# Patient Record
Sex: MALE | Race: BLACK OR AFRICAN AMERICAN | NOT HISPANIC OR LATINO | Employment: FULL TIME | URBAN - METROPOLITAN AREA
[De-identification: names, ages, dates, MRNs, and addresses within clinical notes are randomized per-mention and may not be internally consistent; named-entity substitution may affect disease eponyms.]

---

## 2024-01-06 ENCOUNTER — APPOINTMENT (EMERGENCY)
Dept: CT IMAGING | Facility: HOSPITAL | Age: 54
End: 2024-01-06
Payer: COMMERCIAL

## 2024-01-06 ENCOUNTER — HOSPITAL ENCOUNTER (EMERGENCY)
Facility: HOSPITAL | Age: 54
Discharge: LEFT BEFORE DISCHARGE | End: 2024-01-06
Attending: EMERGENCY MEDICINE | Admitting: EMERGENCY MEDICINE
Payer: COMMERCIAL

## 2024-01-06 VITALS
HEART RATE: 104 BPM | SYSTOLIC BLOOD PRESSURE: 167 MMHG | OXYGEN SATURATION: 100 % | DIASTOLIC BLOOD PRESSURE: 115 MMHG | TEMPERATURE: 97.5 F | RESPIRATION RATE: 16 BRPM

## 2024-01-06 DIAGNOSIS — K63.89 COLONIC MASS: ICD-10-CM

## 2024-01-06 DIAGNOSIS — K56.609 COLONIC OBSTRUCTION (HCC): Primary | ICD-10-CM

## 2024-01-06 DIAGNOSIS — K59.00 CONSTIPATION: ICD-10-CM

## 2024-01-06 LAB
ALBUMIN SERPL BCP-MCNC: 3.9 G/DL (ref 3.5–5)
ALP SERPL-CCNC: 60 U/L (ref 34–104)
ALT SERPL W P-5'-P-CCNC: 17 U/L (ref 7–52)
ANION GAP SERPL CALCULATED.3IONS-SCNC: 10 MMOL/L
AST SERPL W P-5'-P-CCNC: 16 U/L (ref 13–39)
BASOPHILS # BLD AUTO: 0.06 THOUSANDS/ÂΜL (ref 0–0.1)
BASOPHILS NFR BLD AUTO: 1 % (ref 0–1)
BILIRUB SERPL-MCNC: 0.6 MG/DL (ref 0.2–1)
BUN SERPL-MCNC: 14 MG/DL (ref 5–25)
CALCIUM SERPL-MCNC: 9.2 MG/DL (ref 8.4–10.2)
CHLORIDE SERPL-SCNC: 100 MMOL/L (ref 96–108)
CO2 SERPL-SCNC: 23 MMOL/L (ref 21–32)
CREAT SERPL-MCNC: 0.92 MG/DL (ref 0.6–1.3)
EOSINOPHIL # BLD AUTO: 0.1 THOUSAND/ÂΜL (ref 0–0.61)
EOSINOPHIL NFR BLD AUTO: 1 % (ref 0–6)
ERYTHROCYTE [DISTWIDTH] IN BLOOD BY AUTOMATED COUNT: 14.6 % (ref 11.6–15.1)
GFR SERPL CREATININE-BSD FRML MDRD: 95 ML/MIN/1.73SQ M
GLUCOSE SERPL-MCNC: 110 MG/DL (ref 65–140)
HCT VFR BLD AUTO: 44.4 % (ref 36.5–49.3)
HGB BLD-MCNC: 15.3 G/DL (ref 12–17)
IMM GRANULOCYTES # BLD AUTO: 0.01 THOUSAND/UL (ref 0–0.2)
IMM GRANULOCYTES NFR BLD AUTO: 0 % (ref 0–2)
LIPASE SERPL-CCNC: 10 U/L (ref 11–82)
LYMPHOCYTES # BLD AUTO: 1.72 THOUSANDS/ÂΜL (ref 0.6–4.47)
LYMPHOCYTES NFR BLD AUTO: 22 % (ref 14–44)
MCH RBC QN AUTO: 28.8 PG (ref 26.8–34.3)
MCHC RBC AUTO-ENTMCNC: 34.5 G/DL (ref 31.4–37.4)
MCV RBC AUTO: 84 FL (ref 82–98)
MONOCYTES # BLD AUTO: 1.04 THOUSAND/ÂΜL (ref 0.17–1.22)
MONOCYTES NFR BLD AUTO: 14 % (ref 4–12)
NEUTROPHILS # BLD AUTO: 4.75 THOUSANDS/ÂΜL (ref 1.85–7.62)
NEUTS SEG NFR BLD AUTO: 62 % (ref 43–75)
NRBC BLD AUTO-RTO: 0 /100 WBCS
PLATELET # BLD AUTO: 371 THOUSANDS/UL (ref 149–390)
PMV BLD AUTO: 9.5 FL (ref 8.9–12.7)
POTASSIUM SERPL-SCNC: 3.6 MMOL/L (ref 3.5–5.3)
PROCALCITONIN SERPL-MCNC: 0.08 NG/ML
PROT SERPL-MCNC: 7.4 G/DL (ref 6.4–8.4)
RBC # BLD AUTO: 5.31 MILLION/UL (ref 3.88–5.62)
SODIUM SERPL-SCNC: 133 MMOL/L (ref 135–147)
WBC # BLD AUTO: 7.68 THOUSAND/UL (ref 4.31–10.16)

## 2024-01-06 PROCEDURE — 96361 HYDRATE IV INFUSION ADD-ON: CPT

## 2024-01-06 PROCEDURE — 84145 PROCALCITONIN (PCT): CPT | Performed by: PHYSICIAN ASSISTANT

## 2024-01-06 PROCEDURE — 83690 ASSAY OF LIPASE: CPT | Performed by: PHYSICIAN ASSISTANT

## 2024-01-06 PROCEDURE — 99284 EMERGENCY DEPT VISIT MOD MDM: CPT

## 2024-01-06 PROCEDURE — 99285 EMERGENCY DEPT VISIT HI MDM: CPT | Performed by: EMERGENCY MEDICINE

## 2024-01-06 PROCEDURE — G1004 CDSM NDSC: HCPCS

## 2024-01-06 PROCEDURE — 96360 HYDRATION IV INFUSION INIT: CPT

## 2024-01-06 PROCEDURE — 74177 CT ABD & PELVIS W/CONTRAST: CPT

## 2024-01-06 PROCEDURE — 36415 COLL VENOUS BLD VENIPUNCTURE: CPT | Performed by: PHYSICIAN ASSISTANT

## 2024-01-06 PROCEDURE — 85025 COMPLETE CBC W/AUTO DIFF WBC: CPT | Performed by: PHYSICIAN ASSISTANT

## 2024-01-06 PROCEDURE — 80053 COMPREHEN METABOLIC PANEL: CPT | Performed by: PHYSICIAN ASSISTANT

## 2024-01-06 PROCEDURE — 84443 ASSAY THYROID STIM HORMONE: CPT | Performed by: INTERNAL MEDICINE

## 2024-01-06 RX ADMIN — SODIUM CHLORIDE 1000 ML: 0.9 INJECTION, SOLUTION INTRAVENOUS at 12:07

## 2024-01-06 RX ADMIN — IOHEXOL 100 ML: 350 INJECTION, SOLUTION INTRAVENOUS at 12:49

## 2024-01-06 NOTE — ED PROVIDER NOTES
"History  Chief Complaint   Patient presents with    Abdominal Pain     PT \"I feel like I can't poop. I feel like I have not really had anything come out this year. And it is getting to the point where I dont want to eat. I tried that stuff but I dont think it worked. It like charan comes and goes. And I thought I felt good but then I started working, you know bending over and stuff.\" PT denies CP and SOB     This is a 53-year-old male with no significant past medical history presenting to the emergency department today for constipation.  He notes the last time he had a significant bowel movement was on December 25, 2023.  He had 1 small \"nugget\" that came out without blood approximately 4 days ago but he has not had a significant bowel movement since then.  He does not have overflow diarrhea.  He notes generalized abdominal pain associated with abdominal distention.  He has no nausea or vomiting.  He has no fevers or chills.  He has no chest pain or shortness of breath.  He has decreased appetite and has recently unintentionally lost weight.  No prior abdominal surgical history.  The patient denies other complaints at this time.      History provided by:  Patient   used: No    Abdominal Pain  Pain location:  Generalized  Pain radiates to:  Does not radiate  Pain severity:  Moderate  Onset quality:  Gradual  Duration:  2 weeks  Timing:  Constant  Progression:  Worsening  Chronicity:  New  Relieved by:  Nothing  Worsened by:  Nothing  Ineffective treatments:  None tried  Associated symptoms: anorexia and constipation    Associated symptoms: no belching, no chest pain, no chills, no cough, no diarrhea, no dysuria, no fatigue, no fever, no flatus, no hematuria, no melena, no nausea, no shortness of breath, no sore throat and no vomiting        None       No past medical history on file.    Past Surgical History:   Procedure Laterality Date    KNEE SURGERY Right        No family history on file.  I " have reviewed and agree with the history as documented.    E-Cigarette/Vaping    E-Cigarette Use Never User      E-Cigarette/Vaping Substances     Social History     Tobacco Use    Smoking status: Some Days     Current packs/day: 1.00     Types: Cigarettes   Vaping Use    Vaping status: Never Used   Substance Use Topics    Alcohol use: Yes     Comment: daily    Drug use: Yes     Types: Cocaine     Comment: last used: 12/25/23       Review of Systems   Constitutional:  Negative for appetite change, chills, diaphoresis, fatigue and fever.   HENT:  Negative for sore throat.    Eyes:  Negative for visual disturbance.   Respiratory:  Negative for cough, chest tightness, shortness of breath and wheezing.    Cardiovascular:  Negative for chest pain, palpitations and leg swelling.   Gastrointestinal:  Positive for abdominal distention, abdominal pain, anorexia and constipation. Negative for diarrhea, flatus, melena, nausea and vomiting.   Genitourinary:  Negative for dysuria and hematuria.   Musculoskeletal:  Negative for neck pain and neck stiffness.   Skin:  Negative for rash and wound.   Neurological:  Negative for dizziness, seizures, syncope, weakness, light-headedness, numbness and headaches.   Psychiatric/Behavioral:  Negative for confusion.    All other systems reviewed and are negative.      Physical Exam  Physical Exam  Vitals and nursing note reviewed.   Constitutional:       General: He is not in acute distress.     Appearance: Normal appearance. He is normal weight. He is not ill-appearing, toxic-appearing or diaphoretic.   HENT:      Head: Normocephalic and atraumatic.      Nose: Nose normal. No congestion or rhinorrhea.      Mouth/Throat:      Mouth: Mucous membranes are moist.      Pharynx: No oropharyngeal exudate or posterior oropharyngeal erythema.   Eyes:      General: No scleral icterus.        Right eye: No discharge.         Left eye: No discharge.      Conjunctiva/sclera: Conjunctivae normal.    Cardiovascular:      Rate and Rhythm: Normal rate and regular rhythm.      Pulses: Normal pulses.      Heart sounds: Normal heart sounds. No murmur heard.     No friction rub. No gallop.   Pulmonary:      Effort: Pulmonary effort is normal. No respiratory distress.      Breath sounds: Normal breath sounds. No stridor. No wheezing, rhonchi or rales.   Chest:      Chest wall: No tenderness.   Abdominal:      General: Abdomen is flat. There is distension.      Palpations: Abdomen is soft.      Tenderness: There is abdominal tenderness. There is no right CVA tenderness, left CVA tenderness, guarding or rebound.      Comments: The patient has generalized abdominal tenderness to palpation with one area of the abdomen not being more tender than the other; the patient is nonperitoneal; the patient's abdomen is firm and slightly distended   Musculoskeletal:         General: Normal range of motion.      Cervical back: Normal range of motion. No rigidity.      Right lower leg: No edema.      Left lower leg: No edema.   Skin:     General: Skin is warm and dry.      Capillary Refill: Capillary refill takes less than 2 seconds.      Coloration: Skin is not jaundiced or pale.   Neurological:      General: No focal deficit present.      Mental Status: He is alert and oriented to person, place, and time. Mental status is at baseline.   Psychiatric:         Mood and Affect: Mood normal.         Behavior: Behavior normal.         Vital Signs  ED Triage Vitals   Temperature Pulse Respirations Blood Pressure SpO2   01/06/24 1137 01/06/24 1134 01/06/24 1134 01/06/24 1134 01/06/24 1134   97.5 °F (36.4 °C) (!) 110 18 (!) 157/116 100 %      Temp Source Heart Rate Source Patient Position - Orthostatic VS BP Location FiO2 (%)   01/06/24 1137 01/06/24 1134 01/06/24 1134 01/06/24 1134 --   Oral Monitor Sitting Left arm       Pain Score       01/06/24 1200       No Pain           Vitals:    01/06/24 1200 01/06/24 1315 01/06/24 1345 01/06/24  1430   BP: (!) 180/117 (!) 164/114 (!) 175/124 (!) 167/115   Pulse: 92 88 91 104   Patient Position - Orthostatic VS: Sitting Sitting Sitting Sitting         Visual Acuity      ED Medications  Medications   sodium chloride 0.9 % bolus 1,000 mL (0 mL Intravenous Stopped 1/6/24 1345)   iohexol (OMNIPAQUE) 350 MG/ML injection (SINGLE-DOSE) 100 mL (100 mL Intravenous Given 1/6/24 1249)       Diagnostic Studies  Results Reviewed       Procedure Component Value Units Date/Time    Procalcitonin [[id number]]  (Normal) Collected: 01/06/24 1206    Lab Status: Final result Specimen: Blood from Arm, Right Updated: 01/06/24 1242     Procalcitonin 0.08 ng/ml     Comprehensive metabolic panel [[id number]]  (Abnormal) Collected: 01/06/24 1206    Lab Status: Final result Specimen: Blood from Arm, Right Updated: 01/06/24 1231     Sodium 133 mmol/L      Potassium 3.6 mmol/L      Chloride 100 mmol/L      CO2 23 mmol/L      ANION GAP 10 mmol/L      BUN 14 mg/dL      Creatinine 0.92 mg/dL      Glucose 110 mg/dL      Calcium 9.2 mg/dL      AST 16 U/L      ALT 17 U/L      Alkaline Phosphatase 60 U/L      Total Protein 7.4 g/dL      Albumin 3.9 g/dL      Total Bilirubin 0.60 mg/dL      eGFR 95 ml/min/1.73sq m     Narrative:      National Kid[employer] Foundation guidelines for Chronic Kidney Disease (CKD):     Stage 1 with normal or high GFR (GFR > 90 mL/min/1.73 square meters)    Stage 2 Mild CKD (GFR = 60-89 mL/min/1.73 square meters)    Stage 3A Moderate CKD (GFR = 45-59 mL/min/1.73 square meters)    Stage 3B Moderate CKD (GFR = 30-44 mL/min/1.73 square meters)    Stage 4 Severe CKD (GFR = 15-29 mL/min/1.73 square meters)    Stage 5 End Stage CKD (GFR <15 mL/min/1.73 square meters)  Note: GFR calculation is accurate only with a steady state creatinine    Lipase [[id number]]  (Abnormal) Collected: 01/06/24 1206    Lab Status: Final result Specimen: Blood from Arm, Right Updated: 01/06/24 1231     Lipase 10 u/L     CBC and differential  [[id number]]  (Abnormal) Collected: 01/06/24 1206    Lab Status: Final result Specimen: Blood from Arm, Right Updated: 01/06/24 1216     WBC 7.68 Thousand/uL      RBC 5.31 Million/uL      Hemoglobin 15.3 g/dL      Hematocrit 44.4 %      MCV 84 fL      MCH 28.8 pg      MCHC 34.5 g/dL      RDW 14.6 %      MPV 9.5 fL      Platelets 371 Thousands/uL      nRBC 0 /100 WBCs      Neutrophils Relative 62 %      Immat GRANS % 0 %      Lymphocytes Relative 22 %      Monocytes Relative 14 %      Eosinophils Relative 1 %      Basophils Relative 1 %      Neutrophils Absolute 4.75 Thousands/µL      Immature Grans Absolute 0.01 Thousand/uL      Lymphocytes Absolute 1.72 Thousands/µL      Monocytes Absolute 1.04 Thousand/µL      Eosinophils Absolute 0.10 Thousand/µL      Basophils Absolute 0.06 Thousands/µL                    CT abdomen pelvis with contrast   Final Result by Lincoln Chaney MD (01/06 1346)      Partial colonic obstruction secondary to a 7 cm long apple core lesion of the mid sigmoid colon suspicious for colonic malignancy.      The study was marked in EPIC for immediate notification.            Workstation performed: [id number]                    Procedures  Procedures         ED Course  ED Course as of 01/06/24 1705   Sat Jan 06, 2024   1401 TT Mitchell Gill PA-C, general surgery AP on call.                                             Medical Decision Making  This is a 53-year-old male presenting to the emergency department today for constipation associated with anorexia, unintentional weight loss, and constipation.  Symptoms ongoing for approximately 2 weeks.  Has not had a significant bowel movement in 2 weeks.  Vital signs show tachycardia.  On physical examination, the patient's abdomen is generally tender and firm to palpation but nonperitoneal.  The patient has no leukocytosis and CMP is reassuring.  His procalcitonin was 0.08.  The patient was given intravenous fluids while here in the emergency  "department.  CT abdomen and pelvis shows partial colonic obstruction likely secondary to a sigmoid colon malignancy.  Case was discussed both with Mitchell Gill PA-C and Dr. Cheek with general surgery; they recommend liquid diet in addition to consult with gastroenterology.  I spoke with Dr. Gooden with gastroenterology who recommends admission for colonoscopy.  I discussed with the patient but the patient notes he has \"things to do\" at home.  I discussed with the patient that I am concerned that he has colon cancer and that it would benefit him from staying in the hospital especially given the severity of the constipation he has been experiencing.  He notes he will return to the emergency department tomorrow.  Negative consequences of leaving the emergency department AGAINST MEDICAL ADVICE were discussed with the patient.  He is able to tell negative consequences back to me and still wishes not to be admitted to the hospital.  The patient then signed out of the emergency department AGAINST MEDICAL ADVICE.  I gave the patient referrals to general surgery, gastroenterology, and colorectal surgery.  If he does return tomorrow, I recommend a liquid diet in the meantime per general surgery's recommendation.  Patient is aware that he can return to the emergency department at any time for reevaluation and admission.  Strict return precautions were given.  Recommend PCP follow-up as soon as possible. The patient and/or patient's proxy verify their understanding and agree to the plan at this time.  All questions answered to the patient and/or their proxy's satisfaction.  All labs reviewed and utilized in the medical decision making process (if labs were ordered).  Portions of the record may have been created with voice recognition software.  Occasional wrong word or \"sound a like\" substitutions may have occurred due to the inherent limitations of voice recognition software.  Read the chart carefully and recognize, using " context, where substitutions have occurred.    I reviewed prior notes.    Problems Addressed:  Colonic mass: undiagnosed new problem with uncertain prognosis  Colonic obstruction (HCC): undiagnosed new problem with uncertain prognosis  Constipation: undiagnosed new problem with uncertain prognosis    Amount and/or Complexity of Data Reviewed  External Data Reviewed: notes.  Labs: ordered. Decision-making details documented in ED Course.  Radiology: ordered. Decision-making details documented in ED Course.  Discussion of management or test interpretation with external provider(s): Mitchell Gill PA-C - General Surgery  Dr. López - General Surgery  Dr. Gooden - Gastroenterology    Risk  Prescription drug management.             Disposition  Final diagnoses:   Colonic obstruction (HCC)   Colonic mass   Constipation     Time reflects when diagnosis was documented in both MDM as applicable and the Disposition within this note       Time User Action Codes Description Comment    1/6/2024  2:28 PM Stone Mckinney [K56.609] Colonic obstruction (HCC)     1/6/2024  2:28 PM Stone Mckinney [K63.89] Colonic mass     1/6/2024  2:29 PM Stone Mckinney [K59.00] Constipation           ED Disposition       ED Disposition   AMA    Condition   --    Date/Time   Sat Jan 6, 2024  2:28 PM    Comment   Date: 1/6/2024  Patient: Davis Goss  Admitted: 1/6/2024 11:36 AM  Attending Provider: Jackie Schuler MD    Davis Goss or his authorized caregiver has made the decision for the patient to leave the emergency department against the advice  of the emergency department staff (Stone Mckinney PA-C and Dr. Jackie Schuler). He or his authorized caregiver has been informed and understands the inherent risks, including death, total colonic obstruction, metastatic cancer, urinary retention , bladder rupture, intestinal rupture, peritonitis, severe abdominal pain, constipation, obstipation, sepsis,  peritonitis, other morbidities, or even death.  He or his authorized caregiver has decided to accept the responsibility for this decision. LOUISE Goss and all necessary parties have been advised that he may return for further evaluation or treatment. His condition at time of discharge was stable.  Davis Goss had current vital signs as follows:  BP (!) 175/124 (BP Location: Left ar m)   Pulse 91   Temp 97.5 °F (36.4 °C) (Oral)   Resp 16                Follow-up Information       Follow up With Specialties Details Why Contact Info Additional Information    [facility] Emergency Department Emergency Medicine Go to  If symptoms worsen [address] [address] [phone] [facility] Emergency Department, [address] [address]    [facility] Schedule an appointment as soon as possible for a visit   [address] [facility] [address] [phone] [facility], [address] [place] [address], [address] [phone]    Robles Cheek MD General Surgery Call   [address] [facility]  [place] [address]  [phone]       Tawanda Gooden MD Gastroenterology Call   [address] [place] [address] [address] [place] [address] [phone]       Reyes Lyle MD Colon and Rectal Surgery Call   [address] [facility] [address] [phone]               There are no discharge medications for this patient.          PDMP Review       None            ED Provider  Electronically Signed by             Stone Mckinney PA-C  01/06/24 0080

## 2024-01-06 NOTE — DISCHARGE INSTRUCTIONS
Please return to the emergency department for worsening symptoms including chest pain, shortness of breath, dizziness, lightheadedness, fever greater than 103, severe pain, inability to walk, fainting episodes, etc..  Please follow-up with your family practice provider as soon as possible.  You are leaving the emergency department AGAINST MEDICAL ADVICE.  By leaving, you except risks of potential negative consequences that could occur from not being admitted to the hospital.  Please return to the emergency department at any time for admission for further evaluation of your partial colonic obstruction with likely colon cancer.  Please only have a liquid diet at home until you are evaluated by a medical professional.  I have given you referrals to oncology, general surgery, and colorectal surgery in addition to gastroenterology.  You likely have colon cancer.  Please return to the emergency department as soon as possible for admission to ascertain next steps for this diagnosis.

## 2024-01-07 ENCOUNTER — HOSPITAL ENCOUNTER (INPATIENT)
Facility: HOSPITAL | Age: 54
LOS: 13 days | Discharge: HOME HEALTH | DRG: 329 | End: 2024-01-20
Attending: EMERGENCY MEDICINE | Admitting: INTERNAL MEDICINE
Payer: COMMERCIAL

## 2024-01-07 DIAGNOSIS — K51.00 PANCOLITIS (HCC): ICD-10-CM

## 2024-01-07 DIAGNOSIS — R31.9 HEMATURIA: ICD-10-CM

## 2024-01-07 DIAGNOSIS — K63.89 COLONIC MASS: ICD-10-CM

## 2024-01-07 DIAGNOSIS — Z90.49 S/P PARTIAL COLECTOMY: ICD-10-CM

## 2024-01-07 DIAGNOSIS — K59.00 CONSTIPATION: ICD-10-CM

## 2024-01-07 DIAGNOSIS — E87.6 HYPOKALEMIA: ICD-10-CM

## 2024-01-07 DIAGNOSIS — R03.0 ELEVATED BLOOD PRESSURE READING: ICD-10-CM

## 2024-01-07 DIAGNOSIS — R94.31 ABNORMAL EKG: ICD-10-CM

## 2024-01-07 DIAGNOSIS — Z93.3 STATUS POST COLOSTOMY (HCC): ICD-10-CM

## 2024-01-07 DIAGNOSIS — K56.609 COLONIC OBSTRUCTION (HCC): Primary | ICD-10-CM

## 2024-01-07 PROBLEM — Z72.0 TOBACCO ABUSE: Status: ACTIVE | Noted: 2024-01-07

## 2024-01-07 PROBLEM — F19.90 SUBSTANCE USE: Status: ACTIVE | Noted: 2024-01-07

## 2024-01-07 LAB
2HR DELTA HS TROPONIN: 0 NG/L
4HR DELTA HS TROPONIN: 0 NG/L
AMPHETAMINES SERPL QL SCN: NEGATIVE
BARBITURATES UR QL: NEGATIVE
BENZODIAZ UR QL: NEGATIVE
CARDIAC TROPONIN I PNL SERPL HS: 7 NG/L
COCAINE UR QL: POSITIVE
METHADONE UR QL: NEGATIVE
OPIATES UR QL SCN: POSITIVE
OXYCODONE+OXYMORPHONE UR QL SCN: POSITIVE
PCP UR QL: NEGATIVE
THC UR QL: NEGATIVE
TSH SERPL DL<=0.05 MIU/L-ACNC: 1.49 UIU/ML (ref 0.45–4.5)

## 2024-01-07 PROCEDURE — 99283 EMERGENCY DEPT VISIT LOW MDM: CPT

## 2024-01-07 PROCEDURE — 93005 ELECTROCARDIOGRAM TRACING: CPT

## 2024-01-07 PROCEDURE — 99285 EMERGENCY DEPT VISIT HI MDM: CPT | Performed by: PHYSICIAN ASSISTANT

## 2024-01-07 PROCEDURE — 84484 ASSAY OF TROPONIN QUANT: CPT | Performed by: INTERNAL MEDICINE

## 2024-01-07 PROCEDURE — 99223 1ST HOSP IP/OBS HIGH 75: CPT

## 2024-01-07 PROCEDURE — 80307 DRUG TEST PRSMV CHEM ANLYZR: CPT

## 2024-01-07 RX ORDER — ACETAMINOPHEN 325 MG/1
650 TABLET ORAL EVERY 6 HOURS PRN
Status: DISCONTINUED | OUTPATIENT
Start: 2024-01-07 | End: 2024-01-09

## 2024-01-07 RX ORDER — OXYCODONE HYDROCHLORIDE 5 MG/1
5 TABLET ORAL EVERY 4 HOURS PRN
Status: DISCONTINUED | OUTPATIENT
Start: 2024-01-07 | End: 2024-01-09

## 2024-01-07 RX ORDER — ONDANSETRON 2 MG/ML
4 INJECTION INTRAMUSCULAR; INTRAVENOUS EVERY 6 HOURS PRN
Status: DISCONTINUED | OUTPATIENT
Start: 2024-01-07 | End: 2024-01-20 | Stop reason: HOSPADM

## 2024-01-07 RX ORDER — ENOXAPARIN SODIUM 100 MG/ML
40 INJECTION SUBCUTANEOUS DAILY
Status: DISCONTINUED | OUTPATIENT
Start: 2024-01-07 | End: 2024-01-20 | Stop reason: HOSPADM

## 2024-01-07 RX ORDER — NICOTINE 21 MG/24HR
1 PATCH, TRANSDERMAL 24 HOURS TRANSDERMAL DAILY
Status: DISCONTINUED | OUTPATIENT
Start: 2024-01-07 | End: 2024-01-20 | Stop reason: HOSPADM

## 2024-01-07 RX ORDER — OXYCODONE HYDROCHLORIDE 5 MG/1
2.5 TABLET ORAL EVERY 4 HOURS PRN
Status: DISCONTINUED | OUTPATIENT
Start: 2024-01-07 | End: 2024-01-09

## 2024-01-07 RX ORDER — HYDROMORPHONE HCL IN WATER/PF 6 MG/30 ML
0.2 PATIENT CONTROLLED ANALGESIA SYRINGE INTRAVENOUS EVERY 2 HOUR PRN
Status: DISCONTINUED | OUTPATIENT
Start: 2024-01-07 | End: 2024-01-07

## 2024-01-07 RX ORDER — HYDROMORPHONE HCL/PF 1 MG/ML
0.5 SYRINGE (ML) INJECTION EVERY 2 HOUR PRN
Status: DISCONTINUED | OUTPATIENT
Start: 2024-01-07 | End: 2024-01-09

## 2024-01-07 RX ORDER — SODIUM CHLORIDE, SODIUM GLUCONATE, SODIUM ACETATE, POTASSIUM CHLORIDE, MAGNESIUM CHLORIDE, SODIUM PHOSPHATE, DIBASIC, AND POTASSIUM PHOSPHATE .53; .5; .37; .037; .03; .012; .00082 G/100ML; G/100ML; G/100ML; G/100ML; G/100ML; G/100ML; G/100ML
75 INJECTION, SOLUTION INTRAVENOUS CONTINUOUS
Status: DISCONTINUED | OUTPATIENT
Start: 2024-01-07 | End: 2024-01-09

## 2024-01-07 RX ADMIN — OXYCODONE HYDROCHLORIDE 5 MG: 5 TABLET ORAL at 23:11

## 2024-01-07 RX ADMIN — SODIUM CHLORIDE, SODIUM GLUCONATE, SODIUM ACETATE, POTASSIUM CHLORIDE, MAGNESIUM CHLORIDE, SODIUM PHOSPHATE, DIBASIC, AND POTASSIUM PHOSPHATE 75 ML/HR: .53; .5; .37; .037; .03; .012; .00082 INJECTION, SOLUTION INTRAVENOUS at 15:06

## 2024-01-07 RX ADMIN — OXYCODONE HYDROCHLORIDE 5 MG: 5 TABLET ORAL at 18:49

## 2024-01-07 RX ADMIN — HYDROMORPHONE HYDROCHLORIDE 0.5 MG: 1 INJECTION, SOLUTION INTRAMUSCULAR; INTRAVENOUS; SUBCUTANEOUS at 19:56

## 2024-01-07 RX ADMIN — OXYCODONE HYDROCHLORIDE 5 MG: 5 TABLET ORAL at 15:09

## 2024-01-07 RX ADMIN — HYDROMORPHONE HYDROCHLORIDE 0.5 MG: 1 INJECTION, SOLUTION INTRAMUSCULAR; INTRAVENOUS; SUBCUTANEOUS at 16:08

## 2024-01-07 RX ADMIN — ONDANSETRON 4 MG: 2 INJECTION INTRAMUSCULAR; INTRAVENOUS at 16:14

## 2024-01-07 RX ADMIN — SODIUM CHLORIDE, SODIUM GLUCONATE, SODIUM ACETATE, POTASSIUM CHLORIDE, MAGNESIUM CHLORIDE, SODIUM PHOSPHATE, DIBASIC, AND POTASSIUM PHOSPHATE 150 ML/HR: .53; .5; .37; .037; .03; .012; .00082 INJECTION, SOLUTION INTRAVENOUS at 23:18

## 2024-01-07 NOTE — PLAN OF CARE
Problem: PAIN - ADULT  Goal: Verbalizes/displays adequate comfort level or baseline comfort level  Description: Interventions:  - Encourage patient to monitor pain and request assistance  - Assess pain using appropriate pain scale  - Administer analgesics based on type and severity of pain and evaluate response  - Implement non-pharmacological measures as appropriate and evaluate response  - Consider cultural and social influences on pain and pain management  - Notify physician/advanced practitioner if interventions unsuccessful or patient reports new pain  Outcome: Progressing     Problem: DISCHARGE PLANNING  Goal: Discharge to home or other facility with appropriate resources  Description: INTERVENTIONS:  - Identify barriers to discharge w/patient and caregiver  - Arrange for needed discharge resources and transportation as appropriate  - Identify discharge learning needs (meds, wound care, etc.)  - Arrange for interpretive services to assist at discharge as needed  - Refer to Case Management Department for coordinating discharge planning if the patient needs post-hospital services based on physician/advanced practitioner order or complex needs related to functional status, cognitive ability, or social support system  Outcome: Progressing     Problem: Knowledge Deficit  Goal: Patient/family/caregiver demonstrates understanding of disease process, treatment plan, medications, and discharge instructions  Description: Complete learning assessment and assess knowledge base.  Interventions:  - Provide teaching at level of understanding  - Provide teaching via preferred learning methods  Outcome: Progressing     Problem: GASTROINTESTINAL - ADULT  Goal: Minimal or absence of nausea and/or vomiting  Description: INTERVENTIONS:  - Administer IV fluids if ordered to ensure adequate hydration  - Maintain NPO status until nausea and vomiting are resolved  - Nasogastric tube if ordered  - Administer ordered antiemetic  medications as needed  - Provide nonpharmacologic comfort measures as appropriate  - Advance diet as tolerated, if ordered  - Consider nutrition services referral to assist patient with adequate nutrition and appropriate food choices  Outcome: Progressing  Goal: Maintains or returns to baseline bowel function  Description: INTERVENTIONS:  - Assess bowel function  - Encourage oral fluids to ensure adequate hydration  - Administer IV fluids if ordered to ensure adequate hydration  - Administer ordered medications as needed  - Encourage mobilization and activity  - Consider nutritional services referral to assist patient with adequate nutrition and appropriate food choices  Outcome: Progressing  Goal: Maintains adequate nutritional intake  Description: INTERVENTIONS:  - Monitor percentage of each meal consumed  - Identify factors contributing to decreased intake, treat as appropriate  - Assist with meals as needed  - Monitor I&O, weight, and lab values if indicated  - Obtain nutrition services referral as needed  Outcome: Progressing

## 2024-01-07 NOTE — ASSESSMENT & PLAN NOTE
Pt stated he drinks liquor and beer multiple times a week. Unable to specify quantity.  Pt also noted occasional marijuana and coke use   Will order UDS  Initiate CIWA protocol  Continue supportive care

## 2024-01-07 NOTE — ED PROVIDER NOTES
"History  Chief Complaint   Patient presents with    Medical Problem - Re-Evaluation     \"They wanted me to stay, but I had things to do for work, they know why I'm here\" patient refuses to elaborate on diagnosis or symptoms     This is a 53-year-old male with recently diagnosed partial colonic bowel obstruction with sigmoid mass presenting to the emergency department today for reevaluation.  The patient presented to the emergency department yesterday for constipation.  I saw him on this visit.  He was diagnosed with a partial colonic bowel obstruction with sigmoid mass that is likely cancer.  I spoke with gastroenterology who at that time recommended admission for colonoscopy.  He signed out of the emergency department AGAINST MEDICAL ADVICE and told me he would come back today.  He presents to the emergency department today for admission.  Notes he still has not had a bowel movement.  The patient denies other complaints at this time.      History provided by:  Patient   used: No    Medical Problem - Re-Evaluation  Severity:  Moderate  Onset quality:  Gradual  Duration:  2 weeks  Associated symptoms: abdominal pain    Associated symptoms: no chest pain, no congestion, no cough, no diarrhea, no ear pain, no fatigue, no fever, no headaches, no loss of consciousness, no myalgias, no nausea, no rash, no rhinorrhea, no shortness of breath, no sore throat, no vomiting and no wheezing        None       History reviewed. No pertinent past medical history.    Past Surgical History:   Procedure Laterality Date    KNEE SURGERY Right        History reviewed. No pertinent family history.  I have reviewed and agree with the history as documented.    E-Cigarette/Vaping    E-Cigarette Use Never User      E-Cigarette/Vaping Substances     Social History     Tobacco Use    Smoking status: Every Day     Current packs/day: 0.50     Types: Cigarettes    Smokeless tobacco: Never   Vaping Use    Vaping status: Never " Used   Substance Use Topics    Alcohol use: Yes     Comment: daily    Drug use: Yes     Types: Cocaine     Comment: last used: 12/25/23       Review of Systems   Constitutional:  Positive for appetite change. Negative for chills, diaphoresis, fatigue and fever.   HENT:  Negative for congestion, ear pain, rhinorrhea and sore throat.    Eyes:  Negative for visual disturbance.   Respiratory:  Negative for cough, chest tightness, shortness of breath and wheezing.    Cardiovascular:  Negative for chest pain.   Gastrointestinal:  Positive for abdominal distention, abdominal pain and constipation. Negative for blood in stool, diarrhea, nausea and vomiting.   Musculoskeletal:  Negative for myalgias, neck pain and neck stiffness.   Skin:  Negative for rash and wound.   Neurological:  Negative for dizziness, seizures, loss of consciousness, syncope, weakness, light-headedness, numbness and headaches.   Psychiatric/Behavioral:  Negative for confusion.    All other systems reviewed and are negative.      Physical Exam  Physical Exam  Vitals and nursing note reviewed.   Constitutional:       General: He is not in acute distress.     Appearance: Normal appearance. He is normal weight. He is not ill-appearing, toxic-appearing or diaphoretic.   HENT:      Head: Normocephalic and atraumatic.      Nose: Nose normal. No congestion or rhinorrhea.      Mouth/Throat:      Mouth: Mucous membranes are moist.      Pharynx: No oropharyngeal exudate or posterior oropharyngeal erythema.   Eyes:      General: No scleral icterus.        Right eye: No discharge.         Left eye: No discharge.      Conjunctiva/sclera: Conjunctivae normal.   Cardiovascular:      Rate and Rhythm: Normal rate and regular rhythm.      Pulses: Normal pulses.      Heart sounds: Normal heart sounds. No murmur heard.     No friction rub. No gallop.   Pulmonary:      Effort: Pulmonary effort is normal. No respiratory distress.      Breath sounds: Normal breath sounds. No  stridor. No wheezing, rhonchi or rales.   Chest:      Chest wall: No tenderness.   Abdominal:      General: Abdomen is flat. There is distension.      Palpations: Abdomen is soft.      Tenderness: There is abdominal tenderness. There is no right CVA tenderness, left CVA tenderness, guarding or rebound.      Comments: Generalized abdominal tenderness to palpation without any rebound, Rovsing, or McBurney's point tenderness; the abdomen is distended; nonperitoneal   Musculoskeletal:         General: Normal range of motion.      Cervical back: Normal range of motion. No rigidity.      Right lower leg: No edema.      Left lower leg: No edema.   Skin:     General: Skin is warm and dry.      Capillary Refill: Capillary refill takes less than 2 seconds.      Coloration: Skin is not jaundiced or pale.   Neurological:      General: No focal deficit present.      Mental Status: He is alert and oriented to person, place, and time. Mental status is at baseline.   Psychiatric:         Mood and Affect: Mood normal.         Behavior: Behavior normal.         Vital Signs  ED Triage Vitals [01/07/24 1114]   Temperature Pulse Respirations Blood Pressure SpO2   98 °F (36.7 °C) (!) 132 20 156/71 98 %      Temp Source Heart Rate Source Patient Position - Orthostatic VS BP Location FiO2 (%)   Oral Monitor Sitting Left arm --      Pain Score       No Pain           Vitals:    01/07/24 1114 01/07/24 1342 01/07/24 1413 01/07/24 1600   BP: 156/71 (!) 160/103 (!) 159/108 147/92   Pulse: (!) 132 94 99 103   Patient Position - Orthostatic VS: Sitting Sitting Lying Lying         Visual Acuity      ED Medications  Medications   multi-electrolyte (PLASMALYTE-A/ISOLYTE-S PH 7.4) IV solution (75 mL/hr Intravenous New Bag 1/7/24 1506)   acetaminophen (TYLENOL) tablet 650 mg (has no administration in time range)   ondansetron (ZOFRAN) injection 4 mg (4 mg Intravenous Given 1/7/24 1614)   nicotine (NICODERM CQ) 14 mg/24hr TD 24 hr patch 1 patch (1  "patch Transdermal Not Given 1/7/24 1506)   enoxaparin (LOVENOX) subcutaneous injection 40 mg (40 mg Subcutaneous Not Given 1/7/24 1505)   oxyCODONE (ROXICODONE) IR tablet 2.5 mg ( Oral See Alternative 1/7/24 1509)     Or   oxyCODONE (ROXICODONE) IR tablet 5 mg (5 mg Oral Given 1/7/24 1509)   naloxone (NARCAN) 0.04 mg/mL syringe 0.04 mg (has no administration in time range)   HYDROmorphone (DILAUDID) injection 0.5 mg (0.5 mg Intravenous Given 1/7/24 1608)       Diagnostic Studies  Results Reviewed       None                   No orders to display              Procedures  Procedures         ED Course                                             Medical Decision Making  53-year-old male presenting to the emergency department today for admission to the hospital.  Diagnosed yesterday with a partial colonic obstruction secondary to a colonic mass.  Left the emergency department AGAINST MEDICAL ADVICE but came to the emergency department today for admission.  Vital signs show tachycardia.  On physical examination, patient's abdomen is tender to palpation but nonperitoneal.  He is distended.  Similar abdominal examination to yesterday.  Case was discussed with Dr. Heller who accepts the patient for admission.  Strict return precautions were given.  Recommend PCP follow-up as soon as possible. The patient and/or patient's proxy verify their understanding and agree to the plan at this time.  All questions answered to the patient and/or their proxy's satisfaction.  All labs reviewed and utilized in the medical decision making process (if labs were ordered).  Portions of the record may have been created with voice recognition software.  Occasional wrong word or \"sound a like\" substitutions may have occurred due to the inherent limitations of voice recognition software.  Read the chart carefully and recognize, using context, where substitutions have occurred.    I reviewed prior notes.    Problems Addressed:  Colonic mass: " undiagnosed new problem with uncertain prognosis  Colonic obstruction (HCC): undiagnosed new problem with uncertain prognosis  Constipation: undiagnosed new problem with uncertain prognosis    Amount and/or Complexity of Data Reviewed  External Data Reviewed: notes.  Discussion of management or test interpretation with external provider(s): Dr. Heller - [facility]    Risk  Decision regarding hospitalization.             Disposition  Final diagnoses:   Colonic obstruction (HCC)   Colonic mass   Constipation     Time reflects when diagnosis was documented in both MDM as applicable and the Disposition within this note       Time User Action Codes Description Comment    1/7/2024 11:24 AM Stone Mckinney [K56.609] Colonic obstruction (HCC)     1/7/2024 11:24 AM Stone Mckinney [K63.89] Colonic mass     1/7/2024 11:25 AM Stone Mckinney [K59.00] Constipation           ED Disposition       ED Disposition   Admit    Condition   Stable    Date/Time   Sun Jan 7, 2024 11:24 AM    Comment   Case was discussed with Dr. Heller and the patient's admission status was agreed to be Admission Status: inpatient status to the service of Dr. Heller.               Follow-up Information    None         There are no discharge medications for this patient.      No discharge procedures on file.    PDMP Review       None            ED Provider  Electronically Signed by             Stone Mckinney PA-C  01/07/24 6784

## 2024-01-07 NOTE — H&P
[facility]  H&P  Name: Davis Goss 53 y.o. male I MRN: [id number]  Unit/Bed#: -01 I Date of Admission: 1/7/2024   Date of Service: 1/7/2024 I Hospital Day: 0      Assessment/Plan   * Colonic mass  Assessment & Plan  Patient presented to the ED on 1/6 for complaints of ongoing constipation accompanied by weight loss of there past year. Patient stating at the end of December he had loss of appetite due to nausea and vomiting following each meal. Workup identified colonic mass. Pt left AMA but returned on 1/7 for further evaluation.   Does not meet SIRs criteria  CT A/P: Partial colonic obstruction secondary to a 7 cm long apple core lesion of the mid sigmoid colon suspicious for colonic malignancy   With poor oral intake, Initiate light IVF  GI Consulted  Discussed with on call provider, Plan for colonoscopy tomorrow  With concern for partial obstruction and bowel distention noted on CT, Will prep with Soap suds enemas x2 and avoid oral prep at this time.   General Surgery Consulted  Continue supportive care  Pain management, and Anti-emetics PRN    Substance use  Assessment & Plan  Pt stated he drinks liquor and beer multiple times a week. Unable to specify quantity.  Pt also noted occasional marijuana and coke use   Will order UDS  Initiate CIWA protocol  Continue supportive care    Tobacco abuse  Assessment & Plan  PT smokes 1/2 PPD  Per pt, he does not need NRT because he only smokes because its there  NRT ordered  Educated on smoking cessation           VTE Pharmacologic Prophylaxis: VTE Score: 3 Moderate Risk (Score 3-4) - Pharmacological DVT Prophylaxis Ordered: enoxaparin (Lovenox).  Code Status: Level 1 - Full Code   Discussion with family: Patient declined call to .     Anticipated Length of Stay: Patient will be admitted on an inpatient basis with an anticipated length of stay of greater than 2 midnights secondary to colonic mass requiring further evaluation  with GI and General Surgery consultation.    Total Time Spent on Date of Encounter in care of patient: 60 mins. This time was spent on one or more of the following: performing physical exam; counseling and coordination of care; obtaining or reviewing history; documenting in the medical record; reviewing/ordering tests, medications or procedures; communicating with other healthcare professionals and discussing with patient's family/caregivers.    Chief Complaint: Constipation    History of Present Illness:  Davis Goss is a 53 y.o. male with no pertinent PMH who presents with ongoing constipation.  Patient presented to the ED on 1/6 for complaints of ongoing constipation accompanied by weight loss of there past year. Patient stating at the end of December he had loss of appetite due to nausea and vomiting following each meal. Workup identified colonic mass. Pt left AMA but returned on 1/7 for further evaluation.  Pt will be admitted for further evaluation of colonic mass requiring GI and General surgery consultation.    Review of Systems:  Review of Systems   Constitutional:  Positive for appetite change. Negative for chills, fatigue and fever.   HENT:  Negative for congestion.    Respiratory:  Negative for cough, chest tightness and shortness of breath.    Cardiovascular:  Negative for chest pain, palpitations and leg swelling.   Gastrointestinal:  Positive for abdominal pain, constipation, nausea and vomiting.   Genitourinary:  Negative for difficulty urinating.   Musculoskeletal:  Negative for arthralgias.   Neurological:  Negative for dizziness, syncope and light-headedness.   All other systems reviewed and are negative.      Past Medical and Surgical History:   History reviewed. No pertinent past medical history.    Past Surgical History:   Procedure Laterality Date    KNEE SURGERY Right        Meds/Allergies:  Prior to Admission medications    Not on File     I have reviewed home medications with patient  "personally.    Allergies:   Allergies   Allergen Reactions    Penicillins Other (See Comments)     \"I don't know\"       Social History:  Marital Status: Single   Occupation: None  Patient Pre-hospital Living Situation: Apartment  Patient Pre-hospital Level of Mobility: walks  Patient Pre-hospital Diet Restrictions: None  Substance Use History:   Social History     Substance and Sexual Activity   Alcohol Use Yes    Comment: daily     Social History     Tobacco Use   Smoking Status Every Day    Current packs/day: 0.50    Types: Cigarettes   Smokeless Tobacco Never     Social History     Substance and Sexual Activity   Drug Use Yes    Types: Cocaine    Comment: last used: 12/25/23       Family History:  History reviewed. No pertinent family history.    Physical Exam:     Vitals:   Blood Pressure: (!) 159/108 (01/07/24 1413)  Pulse: 99 (01/07/24 1413)  Temperature: 98.1 °F (36.7 °C) (01/07/24 1413)  Temp Source: Tympanic (01/07/24 1413)  Respirations: 16 (01/07/24 1413)  Height: 5' 11\" (180.3 cm) (01/07/24 1413)  Weight - Scale: 71.8 kg (158 lb 4 oz) (01/07/24 1413)  SpO2: 95 % (01/07/24 1413)    Physical Exam  Vitals and nursing note reviewed.   Constitutional:       General: He is not in acute distress.  HENT:      Head: Normocephalic.      Nose: Nose normal. No congestion.      Mouth/Throat:      Mouth: Mucous membranes are moist.      Pharynx: Oropharynx is clear.   Cardiovascular:      Rate and Rhythm: Normal rate and regular rhythm.      Pulses: Normal pulses.      Heart sounds: No murmur heard.  Pulmonary:      Effort: Pulmonary effort is normal. No respiratory distress.   Abdominal:      General: Bowel sounds are normal. There is no distension.      Palpations: Abdomen is soft.      Tenderness: There is no abdominal tenderness.   Musculoskeletal:         General: Normal range of motion.      Cervical back: Normal range of motion.      Right lower leg: No edema.      Left lower leg: No edema.   Skin:     General: " Skin is warm and dry.      Capillary Refill: Capillary refill takes less than 2 seconds.   Neurological:      Mental Status: He is alert and oriented to person, place, and time. Mental status is at baseline.      Motor: No weakness.   Psychiatric:         Mood and Affect: Affect is flat.         Speech: Speech normal.         Behavior: Behavior is cooperative.          Additional Data:     Lab Results:  Results from last 7 days   Lab Units 01/06/24  1206   WBC Thousand/uL 7.68   HEMOGLOBIN g/dL 15.3   HEMATOCRIT % 44.4   PLATELETS Thousands/uL 371   NEUTROS PCT % 62   LYMPHS PCT % 22   MONOS PCT % 14*   EOS PCT % 1     Results from last 7 days   Lab Units 01/06/24  1206   SODIUM mmol/L 133*   POTASSIUM mmol/L 3.6   CHLORIDE mmol/L 100   CO2 mmol/L 23   BUN mg/dL 14   CREATININE mg/dL 0.92   ANION GAP mmol/L 10   CALCIUM mg/dL 9.2   ALBUMIN g/dL 3.9   TOTAL BILIRUBIN mg/dL 0.60   ALK PHOS U/L 60   ALT U/L 17   AST U/L 16   GLUCOSE RANDOM mg/dL 110                 Results from last 7 days   Lab Units 01/06/24  1206   PROCALCITONIN ng/ml 0.08       Lines/Drains:  Invasive Devices       Peripheral Intravenous Line  Duration             Peripheral IV 01/07/24 Left;Ventral (anterior) Forearm <1 day                        Imaging: Reviewed radiology reports from this admission including: abdominal/pelvic CT  No orders to display       EKG and Other Studies Reviewed on Admission:   EKG: No EKG obtained.    ** Please Note: This note has been constructed using a voice recognition system. **

## 2024-01-07 NOTE — ASSESSMENT & PLAN NOTE
PT smokes 1/2 PPD  Per pt, he does not need NRT because he only smokes because its there  NRT ordered  Educated on smoking cessation

## 2024-01-07 NOTE — ASSESSMENT & PLAN NOTE
Patient presented to the ED on 1/6 for complaints of ongoing constipation accompanied by weight loss of there past year. Patient stating at the end of December he had loss of appetite due to nausea and vomiting following each meal. Workup identified colonic mass. Pt left AMA but returned on 1/7 for further evaluation.   Does not meet SIRs criteria  CT A/P: Partial colonic obstruction secondary to a 7 cm long apple core lesion of the mid sigmoid colon suspicious for colonic malignancy   With poor oral intake, Initiate light IVF  GI Consulted  Discussed with on call provider, Plan for colonoscopy tomorrow  With concern for partial obstruction and bowel distention noted on CT, Will prep with Soap suds enemas x2 and avoid oral prep at this time.   General Surgery Consulted  Continue supportive care  Pain management, and Anti-emetics PRN

## 2024-01-07 NOTE — QUICK NOTE
Call for consult on this patient with possible sigmoid mass on CT imaging and partial colonic obstruction, I reviewed the CT imaging as well as results, discussed with Vargas, plans for limited colonoscopy tomorrow.  Give enemas till clear.  Not sure if he will be able to handle oral bowel prep because of significant distention of the colon as well as possible partial bowel obstruction.  May need urgent intervention because of bowel obstruction.  May need urgent bowel resection and/or colostomy.

## 2024-01-08 ENCOUNTER — APPOINTMENT (INPATIENT)
Dept: NON INVASIVE DIAGNOSTICS | Facility: HOSPITAL | Age: 54
DRG: 329 | End: 2024-01-08
Payer: COMMERCIAL

## 2024-01-08 ENCOUNTER — APPOINTMENT (INPATIENT)
Dept: RADIOLOGY | Facility: HOSPITAL | Age: 54
DRG: 329 | End: 2024-01-08
Payer: COMMERCIAL

## 2024-01-08 ENCOUNTER — APPOINTMENT (INPATIENT)
Dept: CT IMAGING | Facility: HOSPITAL | Age: 54
DRG: 329 | End: 2024-01-08
Payer: COMMERCIAL

## 2024-01-08 PROBLEM — R03.0 ELEVATED BLOOD PRESSURE READING: Status: ACTIVE | Noted: 2024-01-08

## 2024-01-08 LAB
ABO GROUP BLD: NORMAL
ABO GROUP BLD: NORMAL
ALBUMIN SERPL BCP-MCNC: 3.5 G/DL (ref 3.5–5)
ALP SERPL-CCNC: 51 U/L (ref 34–104)
ALT SERPL W P-5'-P-CCNC: 11 U/L (ref 7–52)
ANION GAP SERPL CALCULATED.3IONS-SCNC: 7 MMOL/L
AORTIC ROOT: 4.1 CM
APICAL FOUR CHAMBER EJECTION FRACTION: 59 %
AST SERPL W P-5'-P-CCNC: 11 U/L (ref 13–39)
BASOPHILS # BLD AUTO: 0.02 THOUSANDS/ÂΜL (ref 0–0.1)
BASOPHILS NFR BLD AUTO: 0 % (ref 0–1)
BILIRUB SERPL-MCNC: 0.73 MG/DL (ref 0.2–1)
BLD GP AB SCN SERPL QL: NEGATIVE
BUN SERPL-MCNC: 20 MG/DL (ref 5–25)
CALCIUM SERPL-MCNC: 8.5 MG/DL (ref 8.4–10.2)
CEA SERPL-MCNC: 1.5 NG/ML (ref 0–3)
CHLORIDE SERPL-SCNC: 105 MMOL/L (ref 96–108)
CO2 SERPL-SCNC: 27 MMOL/L (ref 21–32)
CREAT SERPL-MCNC: 1.01 MG/DL (ref 0.6–1.3)
E WAVE DECELERATION TIME: 244 MS
E/A RATIO: 0.67
EOSINOPHIL # BLD AUTO: 0.08 THOUSAND/ÂΜL (ref 0–0.61)
EOSINOPHIL NFR BLD AUTO: 1 % (ref 0–6)
ERYTHROCYTE [DISTWIDTH] IN BLOOD BY AUTOMATED COUNT: 15 % (ref 11.6–15.1)
FRACTIONAL SHORTENING: 28 (ref 28–44)
GFR SERPL CREATININE-BSD FRML MDRD: 85 ML/MIN/1.73SQ M
GLUCOSE SERPL-MCNC: 83 MG/DL (ref 65–140)
HCT VFR BLD AUTO: 39.3 % (ref 36.5–49.3)
HGB BLD-MCNC: 13.2 G/DL (ref 12–17)
IMM GRANULOCYTES # BLD AUTO: 0.01 THOUSAND/UL (ref 0–0.2)
IMM GRANULOCYTES NFR BLD AUTO: 0 % (ref 0–2)
INR PPP: 1.35 (ref 0.84–1.19)
INTERVENTRICULAR SEPTUM IN DIASTOLE (PARASTERNAL SHORT AXIS VIEW): 1.3 CM
INTERVENTRICULAR SEPTUM: 1.3 CM (ref 0.6–1.1)
LAAS-AP2: 25.9 CM2
LAAS-AP4: 21.2 CM2
LEFT ATRIUM SIZE: 3.6 CM
LEFT ATRIUM VOLUME (MOD BIPLANE): 80 ML
LEFT ATRIUM VOLUME INDEX (MOD BIPLANE): 41.9 ML/M2
LEFT INTERNAL DIMENSION IN SYSTOLE: 3.3 CM (ref 2.1–4)
LEFT VENTRICULAR INTERNAL DIMENSION IN DIASTOLE: 4.6 CM (ref 3.5–6)
LEFT VENTRICULAR POSTERIOR WALL IN END DIASTOLE: 1.4 CM
LEFT VENTRICULAR STROKE VOLUME: 54 ML
LVSV (TEICH): 54 ML
LYMPHOCYTES # BLD AUTO: 1.42 THOUSANDS/ÂΜL (ref 0.6–4.47)
LYMPHOCYTES NFR BLD AUTO: 20 % (ref 14–44)
MAGNESIUM SERPL-MCNC: 2 MG/DL (ref 1.9–2.7)
MCH RBC QN AUTO: 28.4 PG (ref 26.8–34.3)
MCHC RBC AUTO-ENTMCNC: 33.6 G/DL (ref 31.4–37.4)
MCV RBC AUTO: 85 FL (ref 82–98)
MONOCYTES # BLD AUTO: 1.1 THOUSAND/ÂΜL (ref 0.17–1.22)
MONOCYTES NFR BLD AUTO: 16 % (ref 4–12)
MV E'TISSUE VEL-SEP: 11 CM/S
MV PEAK A VEL: 0.66 M/S
MV PEAK E VEL: 44 CM/S
MV STENOSIS PRESSURE HALF TIME: 71 MS
MV VALVE AREA P 1/2 METHOD: 3.1
NEUTROPHILS # BLD AUTO: 4.38 THOUSANDS/ÂΜL (ref 1.85–7.62)
NEUTS SEG NFR BLD AUTO: 63 % (ref 43–75)
NRBC BLD AUTO-RTO: 0 /100 WBCS
PHOSPHATE SERPL-MCNC: 2.6 MG/DL (ref 2.7–4.5)
PLATELET # BLD AUTO: 353 THOUSANDS/UL (ref 149–390)
PMV BLD AUTO: 10.4 FL (ref 8.9–12.7)
POTASSIUM SERPL-SCNC: 4.2 MMOL/L (ref 3.5–5.3)
PROT SERPL-MCNC: 6.5 G/DL (ref 6.4–8.4)
PROTHROMBIN TIME: 16.5 SECONDS (ref 11.6–14.5)
RA PRESSURE ESTIMATED: 5 MMHG
RBC # BLD AUTO: 4.64 MILLION/UL (ref 3.88–5.62)
RH BLD: POSITIVE
RH BLD: POSITIVE
RIGHT ATRIAL 2D VOLUME: 36 ML
RIGHT ATRIUM AREA SYSTOLE A4C: 16.7 CM2
RIGHT VENTRICLE ID DIMENSION: 3.6 CM
RV PSP: 27 MMHG
SL CV LEFT ATRIUM LENGTH A2C: 6.3 CM
SL CV LV EF: 65
SL CV PED ECHO LEFT VENTRICLE DIASTOLIC VOLUME (MOD BIPLANE) 2D: 97 ML
SL CV PED ECHO LEFT VENTRICLE SYSTOLIC VOLUME (MOD BIPLANE) 2D: 43 ML
SODIUM SERPL-SCNC: 139 MMOL/L (ref 135–147)
SPECIMEN EXPIRATION DATE: NORMAL
TR MAX PG: 22 MMHG
TR PEAK VELOCITY: 2.3 M/S
TRICUSPID ANNULAR PLANE SYSTOLIC EXCURSION: 2.3 CM
TRICUSPID VALVE PEAK REGURGITATION VELOCITY: 2.32 M/S
WBC # BLD AUTO: 7.01 THOUSAND/UL (ref 4.31–10.16)

## 2024-01-08 PROCEDURE — 86850 RBC ANTIBODY SCREEN: CPT | Performed by: SURGERY

## 2024-01-08 PROCEDURE — 82378 CARCINOEMBRYONIC ANTIGEN: CPT | Performed by: SURGERY

## 2024-01-08 PROCEDURE — G1004 CDSM NDSC: HCPCS

## 2024-01-08 PROCEDURE — NC001 PR NO CHARGE: Performed by: SURGERY

## 2024-01-08 PROCEDURE — 85025 COMPLETE CBC W/AUTO DIFF WBC: CPT

## 2024-01-08 PROCEDURE — 80053 COMPREHEN METABOLIC PANEL: CPT

## 2024-01-08 PROCEDURE — 86900 BLOOD TYPING SEROLOGIC ABO: CPT | Performed by: SURGERY

## 2024-01-08 PROCEDURE — 83735 ASSAY OF MAGNESIUM: CPT

## 2024-01-08 PROCEDURE — 85610 PROTHROMBIN TIME: CPT

## 2024-01-08 PROCEDURE — 93306 TTE W/DOPPLER COMPLETE: CPT | Performed by: INTERNAL MEDICINE

## 2024-01-08 PROCEDURE — 99222 1ST HOSP IP/OBS MODERATE 55: CPT | Performed by: NURSE PRACTITIONER

## 2024-01-08 PROCEDURE — 71260 CT THORAX DX C+: CPT

## 2024-01-08 PROCEDURE — 71046 X-RAY EXAM CHEST 2 VIEWS: CPT

## 2024-01-08 PROCEDURE — 86901 BLOOD TYPING SEROLOGIC RH(D): CPT | Performed by: SURGERY

## 2024-01-08 PROCEDURE — 84100 ASSAY OF PHOSPHORUS: CPT

## 2024-01-08 PROCEDURE — 99232 SBSQ HOSP IP/OBS MODERATE 35: CPT

## 2024-01-08 PROCEDURE — 93306 TTE W/DOPPLER COMPLETE: CPT

## 2024-01-08 PROCEDURE — 99223 1ST HOSP IP/OBS HIGH 75: CPT | Performed by: SURGERY

## 2024-01-08 PROCEDURE — 83036 HEMOGLOBIN GLYCOSYLATED A1C: CPT | Performed by: PHYSICIAN ASSISTANT

## 2024-01-08 RX ORDER — HYDRALAZINE HYDROCHLORIDE 20 MG/ML
5 INJECTION INTRAMUSCULAR; INTRAVENOUS EVERY 6 HOURS PRN
Status: DISCONTINUED | OUTPATIENT
Start: 2024-01-08 | End: 2024-01-10

## 2024-01-08 RX ADMIN — ENOXAPARIN SODIUM 40 MG: 40 INJECTION SUBCUTANEOUS at 10:26

## 2024-01-08 RX ADMIN — IOHEXOL 85 ML: 350 INJECTION, SOLUTION INTRAVENOUS at 19:11

## 2024-01-08 RX ADMIN — HYDROMORPHONE HYDROCHLORIDE 0.5 MG: 1 INJECTION, SOLUTION INTRAMUSCULAR; INTRAVENOUS; SUBCUTANEOUS at 17:41

## 2024-01-08 RX ADMIN — HYDROMORPHONE HYDROCHLORIDE 0.5 MG: 1 INJECTION, SOLUTION INTRAMUSCULAR; INTRAVENOUS; SUBCUTANEOUS at 06:25

## 2024-01-08 RX ADMIN — HYDROMORPHONE HYDROCHLORIDE 0.5 MG: 1 INJECTION, SOLUTION INTRAMUSCULAR; INTRAVENOUS; SUBCUTANEOUS at 20:20

## 2024-01-08 RX ADMIN — OXYCODONE HYDROCHLORIDE 5 MG: 5 TABLET ORAL at 22:36

## 2024-01-08 RX ADMIN — HYDROMORPHONE HYDROCHLORIDE 0.5 MG: 1 INJECTION, SOLUTION INTRAMUSCULAR; INTRAVENOUS; SUBCUTANEOUS at 23:14

## 2024-01-08 RX ADMIN — SODIUM CHLORIDE, SODIUM GLUCONATE, SODIUM ACETATE, POTASSIUM CHLORIDE, MAGNESIUM CHLORIDE, SODIUM PHOSPHATE, DIBASIC, AND POTASSIUM PHOSPHATE 150 ML/HR: .53; .5; .37; .037; .03; .012; .00082 INJECTION, SOLUTION INTRAVENOUS at 16:16

## 2024-01-08 RX ADMIN — HYDROMORPHONE HYDROCHLORIDE 0.5 MG: 1 INJECTION, SOLUTION INTRAMUSCULAR; INTRAVENOUS; SUBCUTANEOUS at 14:56

## 2024-01-08 RX ADMIN — HYDROMORPHONE HYDROCHLORIDE 0.5 MG: 1 INJECTION, SOLUTION INTRAMUSCULAR; INTRAVENOUS; SUBCUTANEOUS at 10:24

## 2024-01-08 NOTE — PLAN OF CARE
Problem: PAIN - ADULT  Goal: Verbalizes/displays adequate comfort level or baseline comfort level  Description: Interventions:  - Encourage patient to monitor pain and request assistance  - Assess pain using appropriate pain scale  - Administer analgesics based on type and severity of pain and evaluate response  - Implement non-pharmacological measures as appropriate and evaluate response  - Consider cultural and social influences on pain and pain management  - Notify physician/advanced practitioner if interventions unsuccessful or patient reports new pain  1/7/2024 2016 by Shanell Veras RN  Outcome: Progressing  1/7/2024 1627 by Shanell Veras RN  Outcome: Progressing     Problem: DISCHARGE PLANNING  Goal: Discharge to home or other facility with appropriate resources  Description: INTERVENTIONS:  - Identify barriers to discharge w/patient and caregiver  - Arrange for needed discharge resources and transportation as appropriate  - Identify discharge learning needs (meds, wound care, etc.)  - Arrange for interpretive services to assist at discharge as needed  - Refer to Case Management Department for coordinating discharge planning if the patient needs post-hospital services based on physician/advanced practitioner order or complex needs related to functional status, cognitive ability, or social support system  1/7/2024 2016 by Shanell Veras RN  Outcome: Progressing  1/7/2024 1627 by Shanell Veras RN  Outcome: Progressing     Problem: Knowledge Deficit  Goal: Patient/family/caregiver demonstrates understanding of disease process, treatment plan, medications, and discharge instructions  Description: Complete learning assessment and assess knowledge base.  Interventions:  - Provide teaching at level of understanding  - Provide teaching via preferred learning methods  1/7/2024 2016 by Shanell Veras RN  Outcome: Progressing  1/7/2024 1627 by Shanell Veras RN  Outcome: Progressing      Problem: GASTROINTESTINAL - ADULT  Goal: Minimal or absence of nausea and/or vomiting  Description: INTERVENTIONS:  - Administer IV fluids if ordered to ensure adequate hydration  - Maintain NPO status until nausea and vomiting are resolved  - Nasogastric tube if ordered  - Administer ordered antiemetic medications as needed  - Provide nonpharmacologic comfort measures as appropriate  - Advance diet as tolerated, if ordered  - Consider nutrition services referral to assist patient with adequate nutrition and appropriate food choices  1/7/2024 2016 by Shanell Veras RN  Outcome: Progressing  1/7/2024 1627 by Shanell Veras RN  Outcome: Progressing  Goal: Maintains or returns to baseline bowel function  Description: INTERVENTIONS:  - Assess bowel function  - Encourage oral fluids to ensure adequate hydration  - Administer IV fluids if ordered to ensure adequate hydration  - Administer ordered medications as needed  - Encourage mobilization and activity  - Consider nutritional services referral to assist patient with adequate nutrition and appropriate food choices  1/7/2024 2016 by Shanell Veras RN  Outcome: Progressing  1/7/2024 1627 by Shanell Veras RN  Outcome: Progressing  Goal: Maintains adequate nutritional intake  Description: INTERVENTIONS:  - Monitor percentage of each meal consumed  - Identify factors contributing to decreased intake, treat as appropriate  - Assist with meals as needed  - Monitor I&O, weight, and lab values if indicated  - Obtain nutrition services referral as needed  1/7/2024 2016 by Shanell Veras RN  Outcome: Progressing  1/7/2024 1627 by Shanell Veras RN  Outcome: Progressing

## 2024-01-08 NOTE — CONSULTS
Consultation - General Surgery  Davis Goss 53 y.o. male MRN: [id number]  Unit/Bed#: -01 Encounter: [id number]    Reason for Consult: colonic mass      Assessment/Plan:  54 y/o male with PMH tobacco use, occasional cocaine use, p/w no BM for 3 weeks and worsening abdominal pain with N/V, found to have sigmoid mass on CT c/f malignancy  Pt reports that he is feeling better since admission, abdominal pain is better and nausea/vomiting has resolved  Still bloated  Abdomen firm with palpable stool burden, non-tender, hypoactive bowel sounds  Pt has had some Bms with enemas  Afebrile, tachycardia in 90s-100s, BP elevated, remainder of VSS  Labs WNL  UDS +cocaine  CT with partial colonic obstruction 2/2 7cm apple core lesion of mid sigmoid c/f malignancy, scattered hepatic hypodensities (cysts vs metastatic disease)  Abnormal ECG, troponin negative, no CP    Colonoscopy today with GI  Surgical planning after colonoscopy; pt will need partial colectomy  Echo pending given abnormal ECG  PRN analgesia/antiemetics  D/W attending        HPI:    Davis Goss is a 53 y.o. male with past medical history of tobacco use and occasional cocaine use who presents with worsening abdominal pain and nausea/vomiting in the setting of no bowel movement for the past 3 weeks.  CT revealed partial colonic obstruction secondary to 7 cm apple core lesion of mid sigmoid concerning for malignancy as well as scattered hepatic hypodensities which may be cyst versus metastatic disease.  Urine drug screen upon arrival positive for cocaine which patient admits to using within the last several weeks to see if it would help with abdominal pain.  Patient endorses occasional cocaine use but is unable to quantify how often he uses cocaine.  Daily tobacco use, reports amount of cigarettes that he smokes varies.  Patient denies any recent changes to bowel habits prior to 3 weeks ago that he can remember, however he does not really pay attention to  his bowel movements very much.  Reports that he is unsure how often he usually has a bowel movement.  Denies any prior medical problems except for a knee injury which she had surgery for in 1993.  He does not take any daily medications.  Reports that his maternal grandmother had cancer of some kind but he is not sure what kind of cancer she had.  Denies any personal history of malignancy.  Upon arrival, patient is afebrile however tachycardic with elevated blood pressure.  Remainder of vitals are stable.  Labs are within normal limits except for urine drug screen as above.  Abnormal EKG with T wave inversions in lateral leads V3 to V6.  1 troponin was done and was negative and patient denies chest pain.  Medical team planning for echocardiogram to further evaluate.    At the time of my exam, patient reports that he is feeling slightly better than when he came into the hospital.  He is no longer nauseous or vomiting and reports that his abdominal pain is better although he still feels bloated.  Abdomen distended and firm to palpation but no signs of peritonitis.  Hypoactive bowel sounds appreciated.  Patient is having colonoscopy today with GI, likely this afternoon.    Surgical history includes knee surgery in 1993.  No intra-abdominal surgeries.    Review of Systems:  Review of Systems   Constitutional:  Negative for chills and fever.   Respiratory:  Negative for shortness of breath.    Cardiovascular:  Negative for chest pain.   Gastrointestinal:  Positive for abdominal distention and abdominal pain. Negative for nausea and vomiting.   Genitourinary:  Negative for difficulty urinating and dysuria.        Historical Information   History reviewed. No pertinent past medical history.  Past Surgical History:   Procedure Laterality Date    KNEE SURGERY Right      Social History   Social History     Substance and Sexual Activity   Alcohol Use Yes    Comment: daily     Social History     Substance and Sexual Activity  "  Drug Use Yes    Types: Cocaine    Comment: last used: 12/25/23     Social History     Tobacco Use   Smoking Status Every Day    Current packs/day: 0.50    Types: Cigarettes   Smokeless Tobacco Never     History reviewed. No pertinent family history.    Medications:  Current Facility-Administered Medications   Medication Dose Route Frequency    acetaminophen (TYLENOL) tablet 650 mg  650 mg Oral Q6H PRN    enoxaparin (LOVENOX) subcutaneous injection 40 mg  40 mg Subcutaneous Daily    HYDROmorphone (DILAUDID) injection 0.5 mg  0.5 mg Intravenous Q2H PRN    multi-electrolyte (PLASMALYTE-A/ISOLYTE-S PH 7.4) IV solution  150 mL/hr Intravenous Continuous    naloxone (NARCAN) 0.04 mg/mL syringe 0.04 mg  0.04 mg Intravenous Q1MIN PRN    nicotine (NICODERM CQ) 14 mg/24hr TD 24 hr patch 1 patch  1 patch Transdermal Daily    ondansetron (ZOFRAN) injection 4 mg  4 mg Intravenous Q6H PRN    oxyCODONE (ROXICODONE) IR tablet 2.5 mg  2.5 mg Oral Q4H PRN    Or    oxyCODONE (ROXICODONE) IR tablet 5 mg  5 mg Oral Q4H PRN       Allergies   Allergen Reactions    Penicillins Other (See Comments)     \"I don't know\"       Physical Examination:  Vitals:   Vitals:    01/07/24 2311 01/08/24 0259 01/08/24 0600 01/08/24 0732   BP: (!) 142/105 (!) 155/105  144/98   BP Location: Left arm Left arm  Left arm   Pulse: 94 88  87   Resp: 17 17  18   Temp: 98.6 °F (37 °C) 98.4 °F (36.9 °C)  98 °F (36.7 °C)   TempSrc: Tympanic Oral  Oral   SpO2: 95% 97%  96%   Weight:   72.1 kg (159 lb)    Height:         Temp  Min: 98 °F (36.7 °C)  Max: 98.6 °F (37 °C)  IBW (Ideal Body Weight): 75.3 kg    Physical Exam  Vitals reviewed.   Constitutional:       General: He is not in acute distress.     Appearance: He is not toxic-appearing.   HENT:      Head: Normocephalic and atraumatic.   Eyes:      Extraocular Movements: Extraocular movements intact.   Pulmonary:      Effort: Pulmonary effort is normal. No respiratory distress.   Abdominal:      General: Bowel " sounds are decreased. There is distension.      Tenderness: There is abdominal tenderness. There is no guarding or rebound.      Comments: Firm abdomen, palpable stool burden, no peritonitic signs   Musculoskeletal:         General: Normal range of motion.      Cervical back: Normal range of motion.   Skin:     General: Skin is warm and dry.   Neurological:      Mental Status: He is alert and oriented to person, place, and time.   Psychiatric:         Mood and Affect: Mood normal.         Behavior: Behavior normal.         Thought Content: Thought content normal.          Diagnostic Data:  Lab: I have personally reviewed pertinent lab results., CBC:   Lab Results   Component Value Date    WBC 7.01 01/08/2024    HGB 13.2 01/08/2024    HCT 39.3 01/08/2024    MCV 85 01/08/2024     01/08/2024    RBC 4.64 01/08/2024    MCH 28.4 01/08/2024    MCHC 33.6 01/08/2024    RDW 15.0 01/08/2024    MPV 10.4 01/08/2024    NRBC 0 01/08/2024   , CMP:   Lab Results   Component Value Date    SODIUM 139 01/08/2024    K 4.2 01/08/2024     01/08/2024    CO2 27 01/08/2024    BUN 20 01/08/2024    CREATININE 1.01 01/08/2024    CALCIUM 8.5 01/08/2024    AST 11 (L) 01/08/2024    ALT 11 01/08/2024    ALKPHOS 51 01/08/2024    EGFR 85 01/08/2024     Results from last 7 days   Lab Units 01/08/24  0449   WBC Thousand/uL 7.01   HEMOGLOBIN g/dL 13.2   HEMATOCRIT % 39.3   PLATELETS Thousands/uL 353     Results from last 7 days   Lab Units 01/08/24  0449   POTASSIUM mmol/L 4.2   CHLORIDE mmol/L 105   CO2 mmol/L 27   BUN mg/dL 20   CREATININE mg/dL 1.01   CALCIUM mg/dL 8.5   ALK PHOS U/L 51   ALT U/L 11   AST U/L 11*       Imaging: I have personally reviewed the pertinent imaging studies on the PACS system  Procedure: CT abdomen pelvis with contrast    Result Date: 1/6/2024  Narrative: CT ABDOMEN AND PELVIS WITH IV CONTRAST INDICATION: Abdominal pain, acute, nonlocalized constipation; generalized abdominal pain. COMPARISON: None. TECHNIQUE:  CT examination of the abdomen and pelvis was performed. Multiplanar 2D reformatted images were created from the source data. This examination, like all CT scans performed in the [facility], was performed utilizing techniques to minimize radiation dose exposure, including the use of iterative reconstruction and automated exposure control. Radiation dose length product (DLP) for this visit: 390 mGy-cm IV Contrast: 100 mL of iohexol (OMNIPAQUE) Enteric Contrast: Enteric contrast was not administered. FINDINGS: ABDOMEN LOWER CHEST: No clinically significant abnormality identified in the visualized lower chest. LIVER/BILIARY TREE: Scattered well-defined subcentimeter hepatic hypodensities; for example 4 mm segment 8 hypodensity #201/21 more likely to represent cysts and metastatic disease. This can be better characterized with hepatic MRI. GALLBLADDER: No calcified gallstones. No pericholecystic inflammatory change. SPLEEN: Unremarkable. PANCREAS: Unremarkable. ADRENAL GLANDS: Unremarkable. KIDNEYS/URETERS: No hydronephrosis. Simple cyst. STOMACH AND BOWEL: Large quantity of rectal stool with abrupt caliber change of the mid sigmoid colon at an area of circumferential thickening extending for 7 cm #201/143 suspicious for colonic malignancy. APPENDIX: No findings to suggest appendicitis. ABDOMINOPELVIC CAVITY: No ascites.  No pneumoperitoneum.  No lymphadenopathy. VESSELS: Unremarkable for patient's age. PELVIS REPRODUCTIVE ORGANS: Unremarkable for patient's age. URINARY BLADDER: Unremarkable. ABDOMINAL WALL/INGUINAL REGIONS: Unremarkable. OSSEOUS STRUCTURES: No acute fracture or destructive osseous lesion. Subcentimeter sclerotic foci in the left ilium and left femur are likely bone islands.     Impression: Partial colonic obstruction secondary to a 7 cm long apple core lesion of the mid sigmoid colon suspicious for colonic malignancy. The study was marked in EPIC for immediate notification. Workstation  performed: [id number]       Active medications:  The patients active medications were reviewed and modified as appropriate  VTE Prophylaxis: Enoxaparin (Lovenox)      Code Status: Level 1 - Full Code    Sandra Alexander PA-C  1/8/2024

## 2024-01-08 NOTE — ASSESSMENT & PLAN NOTE
Pt with notable HTN on admission, Initially believed secondary to abdominal pain with nausea and vomiting but BP now persistently elevated.   No prior history of HTN  Monitor While admitted  Continue pain management

## 2024-01-08 NOTE — ASSESSMENT & PLAN NOTE
PT smokes 1/2 PPD  Per pt, he does not need NRT because he only smokes because its there  NRT while admitted  Educated on smoking cessation

## 2024-01-08 NOTE — PROGRESS NOTES
NGT placed with assistance of RN and attached to LIS. Pt tolerated procedure well. Surgical consent obtained and placed in pt's chart at nurse's station.     Sandra Alexander  1/8/2024

## 2024-01-08 NOTE — ASSESSMENT & PLAN NOTE
Patient presented to the ED on 1/6 for complaints of ongoing constipation accompanied by weight loss of there past year. Patient stating at the end of December he had loss of appetite due to nausea and vomiting following each meal. Workup identified colonic mass. Pt left AMA but returned on 1/7 for further evaluation.   CT A/P: Partial colonic obstruction secondary to a 7 cm long apple core lesion of the mid sigmoid colon suspicious for colonic malignancy   With poor oral intake, now NPO. C/w IVF  GI Consulted  S/p soap sudds enema x3. Originally planned for Flex Sig this afternoon but after further evaluation, Surgery will obtain tissue samples during surgical procedure  Will need CEA, CT Chest and oncology follow up pending course  General Surgery Consulted  Plan for Emergent Laparotomy and possible Jo's procedure tomorrow. Unable to prep due to complete obstruction, anastomosis is not feasible. Pt will end up with Temporary Colostomy  Awaiting Cardiac Clearance  With abnormal EKG on admission  Echo: Ordered  Cardiology Consulted for Cardiac clearance  Continue supportive care  Pain management, and Anti-emetics PRN

## 2024-01-08 NOTE — PLAN OF CARE
Problem: PAIN - ADULT  Goal: Verbalizes/displays adequate comfort level or baseline comfort level  Description: Interventions:  - Encourage patient to monitor pain and request assistance  - Assess pain using appropriate pain scale  - Administer analgesics based on type and severity of pain and evaluate response  - Implement non-pharmacological measures as appropriate and evaluate response  - Consider cultural and social influences on pain and pain management  - Notify physician/advanced practitioner if interventions unsuccessful or patient reports new pain  Outcome: Progressing     Problem: Knowledge Deficit  Goal: Patient/family/caregiver demonstrates understanding of disease process, treatment plan, medications, and discharge instructions  Description: Complete learning assessment and assess knowledge base.  Interventions:  - Provide teaching at level of understanding  - Provide teaching via preferred learning methods  Outcome: Progressing     Problem: GASTROINTESTINAL - ADULT  Goal: Minimal or absence of nausea and/or vomiting  Description: INTERVENTIONS:  - Administer IV fluids if ordered to ensure adequate hydration  - Maintain NPO status until nausea and vomiting are resolved  - Nasogastric tube if ordered  - Administer ordered antiemetic medications as needed  - Provide nonpharmacologic comfort measures as appropriate  - Advance diet as tolerated, if ordered  - Consider nutrition services referral to assist patient with adequate nutrition and appropriate food choices  Outcome: Progressing

## 2024-01-08 NOTE — ASSESSMENT & PLAN NOTE
Pt stated he drinks liquor and beer multiple times a week. Unable to specify quantity.  Pt also noted occasional marijuana and coke use   UDS: (+) Opiates and Cocaine  It appears pt did receive Oral Oxy and IV dilaudid prior to drug screen  Continue CIWA protocol  Continue supportive care

## 2024-01-08 NOTE — CONSULTS
[facility]ultation -  [facility]  Davis Goss 53 y.o. male MRN: [id number]  Unit/Bed#: -01 Encounter: [id number]        Inpatient consult to gastroenterology  Consult performed by: WES Howe  Consult ordered by: WES Peng          Reason for Consult / Principal Problem:     Colonic Mass      ASSESSMENT AND PLAN:      52 y/o male w/ PMH of polysubstance abuse (cocaine, alcohol), tobacco use, and knee surgery who presented with change in bowel habits since Christmas with new onset constipation, bloating, abdominal pain and intermittent nausea/vomiting, weight loss.  CT abdomen pelvis on admission showed partial colonic obstruction secondary to a 7 cm long apple core lesion of the mid sigmoid colon suspicious for colonic malignancy    #1 Abnormal CT abdomen: CT on admission concerning for partially obstructing 7cm long apple core lesion in the mid sigmoid colon. Pt has never had colon cancer screening & doesn't follow with a PCP. Denies any known family hx of colon cancer or any GI symptoms prior to recent onset around Christmas. Abdomen currently softly distended with positive bowel sounds, reports passage of gas/stool w/ enemas. He has not had any N/V since yesterday.    -S/p 2 soap suds enemas  -Flexible sigmoidoscopy originally scheduled today for further evaluation and attempted biopsy however after discussion w/ attending this was cancelled. Surgery planning to pursue partial colectomy tomorrow & can obtain tissue sample at this time.  -Diet per surgery  -Will need CEA, CT chest, oncology follow up pending course        Thank you for the consultation. Case will be discussed with Dr. Diaz      ______________________________________________________________________    HPI:  Davis Goss is a 53 y.o. male with PMH polysubstance abuse, tobacco use, and knee surgery who presented with ongoing abdominal pain and constipation. He originally presented 1/6 with abdominal  pain and constipation x 2 weeks with associated weight loss and  found to have a partial colonic obstruction secondary to a 7cm long apple core lesion of the mid sigmoid colon suspicious for colonic malignancy on CT. He left AMA due to his job, but has now returned.     He reports no significant bowel movement since Christmas with associated abdominal bloating, abdominal pain, and intermittent nausea and vomiting.  He stopped eating and was only drinking fluids because he reports there was nowhere for the food to go and subsequently has lost weight.  He denies any known family history of colonic malignancy.  Reports he was moving his bowels normally prior to Christmas.  He is unaware if he has a history of rectal bleeding because he admits he does not look.     Smokes half a pack of cigarettes daily.  Uses cocaine, last use around Christmas/New Year's Radha per his report because his friends thought it might help him move his bowels but denies regular use.  Denies any IV drug use.  Drinks alcohol regularly, but very vague about the amount.  He had an enema yesterday with some stool output.  Repeat enema done this morning did not have as much output, patient reports only a few drops of stool and he did feels like it did not go in as easily as the first.  Abdomen remains softly distended with positive bowel sounds.  He reports passage of gas intermittently.  Denies any nausea or vomiting currently, does report vomiting yesterday.      REVIEW OF SYSTEMS:    CONSTITUTIONAL: Denies any fever, chills, rigors, and weight loss.  HEENT: No earache or tinnitus. Denies hearing loss or visual disturbances.  CARDIOVASCULAR: No chest pain or palpitations.   RESPIRATORY: Denies any cough, hemoptysis, shortness of breath or dyspnea on exertion.  GASTROINTESTINAL: As noted in the History of Present Illness.   GENITOURINARY: No problems with urination. Denies any hematuria or dysuria.  NEUROLOGIC: No dizziness or vertigo, denies  "headaches.   MUSCULOSKELETAL: Denies any muscle or joint pain.   SKIN: Denies skin rashes or itching.   ENDOCRINE: Denies excessive thirst. Denies intolerance to heat or cold.  PSYCHOSOCIAL: Denies depression or anxiety. Denies any recent memory loss.       Historical Information   History reviewed. No pertinent past medical history.  Past Surgical History:   Procedure Laterality Date    KNEE SURGERY Right      Social History   Social History     Substance and Sexual Activity   Alcohol Use Yes    Comment: daily     Social History     Substance and Sexual Activity   Drug Use Yes    Types: Cocaine    Comment: last used: 12/25/23     Social History     Tobacco Use   Smoking Status Every Day    Current packs/day: 0.50    Types: Cigarettes   Smokeless Tobacco Never     History reviewed. No pertinent family history.    Meds/Allergies     No medications prior to admission.     Current Facility-Administered Medications   Medication Dose Route Frequency    acetaminophen (TYLENOL) tablet 650 mg  650 mg Oral Q6H PRN    enoxaparin (LOVENOX) subcutaneous injection 40 mg  40 mg Subcutaneous Daily    HYDROmorphone (DILAUDID) injection 0.5 mg  0.5 mg Intravenous Q2H PRN    multi-electrolyte (PLASMALYTE-A/ISOLYTE-S PH 7.4) IV solution  150 mL/hr Intravenous Continuous    naloxone (NARCAN) 0.04 mg/mL syringe 0.04 mg  0.04 mg Intravenous Q1MIN PRN    nicotine (NICODERM CQ) 14 mg/24hr TD 24 hr patch 1 patch  1 patch Transdermal Daily    ondansetron (ZOFRAN) injection 4 mg  4 mg Intravenous Q6H PRN    oxyCODONE (ROXICODONE) IR tablet 2.5 mg  2.5 mg Oral Q4H PRN    Or    oxyCODONE (ROXICODONE) IR tablet 5 mg  5 mg Oral Q4H PRN       Allergies   Allergen Reactions    Penicillins Other (See Comments)     \"I don't know\"           Objective     Blood pressure 144/98, pulse 87, temperature 98 °F (36.7 °C), temperature source Oral, resp. rate 18, height 5' 11\" (1.803 m), weight 72.1 kg (159 lb), SpO2 96%. Body mass index is 22.18 " kg/m².      Intake/Output Summary (Last 24 hours) at 1/8/2024 0827  Last data filed at 1/8/2024 0600  Gross per 24 hour   Intake 1010 ml   Output 400 ml   Net 610 ml         PHYSICAL EXAM:      General Appearance:   Alert, cooperative, no distress   HEENT:   Normocephalic, atraumatic, anicteric.     Neck:  Supple, symmetrical, trachea midline   Lungs:   Clear to auscultation bilaterally; no rales, rhonchi or wheezing; respirations unlabored    Heart::   Regular rate and rhythm; no murmur, rub, or gallop.   Abdomen:   Softly distended; normal bowel sounds; no masses, no organomegaly; reports generalized tenderness to palpation   Genitalia:   Deferred    Rectal:   Deferred    Extremities:  No cyanosis, clubbing or edema    Pulses:  2+ and symmetric all extremities    Skin:  No jaundice, rashes, or lesions    Lymph nodes:  No palpable cervical lymphadenopathy        Lab Results:   Admission on 01/07/2024   Component Date Value    Amph/Meth UR 01/07/2024 Negative     Barbiturate Ur 01/07/2024 Negative     Benzodiazepine Urine 01/07/2024 Negative     Cocaine Urine 01/07/2024 Positive (A)     Methadone Urine 01/07/2024 Negative     Opiate Urine 01/07/2024 Positive (A)     PCP Ur 01/07/2024 Negative     THC Urine 01/07/2024 Negative     Oxycodone Urine 01/07/2024 Positive (A)     hs TnI 0hr 01/07/2024 7     hs TnI 2hr 01/07/2024 7     Delta 2hr hsTnI 01/07/2024 0     hs TnI 4hr 01/07/2024 7     Delta 4hr hsTnI 01/07/2024 0     TSH 3RD GENERATON 01/06/2024 1.485     Sodium 01/08/2024 139     Potassium 01/08/2024 4.2     Chloride 01/08/2024 105     CO2 01/08/2024 27     ANION GAP 01/08/2024 7     BUN 01/08/2024 20     Creatinine 01/08/2024 1.01     Glucose 01/08/2024 83     Calcium 01/08/2024 8.5     AST 01/08/2024 11 (L)     ALT 01/08/2024 11     Alkaline Phosphatase 01/08/2024 51     Total Protein 01/08/2024 6.5     Albumin 01/08/2024 3.5     Total Bilirubin 01/08/2024 0.73     eGFR 01/08/2024 85     Magnesium 01/08/2024  2.0     Phosphorus 01/08/2024 2.6 (L)     WBC 01/08/2024 7.01     RBC 01/08/2024 4.64     Hemoglobin 01/08/2024 13.2     Hematocrit 01/08/2024 39.3     MCV 01/08/2024 85     MCH 01/08/2024 28.4     MCHC 01/08/2024 33.6     RDW 01/08/2024 15.0     MPV 01/08/2024 10.4     Platelets 01/08/2024 353     nRBC 01/08/2024 0     Neutrophils Relative 01/08/2024 63     Immat GRANS % 01/08/2024 0     Lymphocytes Relative 01/08/2024 20     Monocytes Relative 01/08/2024 16 (H)     Eosinophils Relative 01/08/2024 1     Basophils Relative 01/08/2024 0     Neutrophils Absolute 01/08/2024 4.38     Immature Grans Absolute 01/08/2024 0.01     Lymphocytes Absolute 01/08/2024 1.42     Monocytes Absolute 01/08/2024 1.10     Eosinophils Absolute 01/08/2024 0.08     Basophils Absolute 01/08/2024 0.02        Imaging Studies: I have personally reviewed pertinent imaging studies.

## 2024-01-08 NOTE — UTILIZATION REVIEW
"Initial Clinical Review    Admission: Date/Time/Statement:   Admission Orders (From admission, onward)       Ordered        01/07/24 1125  INPATIENT ADMISSION  Once                          Orders Placed This Encounter   Procedures    INPATIENT ADMISSION     Standing Status:   Standing     Number of Occurrences:   1     Order Specific Question:   Level of Care     Answer:   Med Surg [16]     Order Specific Question:   Estimated length of stay     Answer:   More than 2 Midnights     Order Specific Question:   Certification     Answer:   I certify that inpatient services are medically necessary for this patient for a duration of greater than two midnights. See H&P and MD Progress Notes for additional information about the patient's course of treatment.     ED Arrival Information       Expected   -    Arrival   1/7/2024 11:06    Acuity   Urgent              Means of arrival   Walk-In    Escorted by   Self    Service   Hospitalist    Admission type   Emergency              Arrival complaint   constipation             Chief Complaint   Patient presents with    Medical Problem - Re-Evaluation     \"They wanted me to stay, but I had things to do for work, they know why I'm here\" patient refuses to elaborate on diagnosis or symptoms       Initial Presentation: 53 y.o. male with hx substance abuse -drinks liquor and beer multiple times a week. Unable to specify quantity.Pt also noted occasional marijuana and coke use . 1/2 PPD smoker . Pt presents to ED from home with ongoing constipation  .Patient also seen in ED 1/6 with c/o  ongoing constipation accompanied by weight loss  stating at the end of December he had loss of appetite due to nausea and vomiting following each meal. Workup identified partial obstruction 2/2 colonic mass. Pt left AMA 1/6, now returning to ED 1/7 . On exam, pt with elevated BP, , tachycardic . Abdomen distended with generalized tenderness. . Pt admitted as Inpatient with colonic mass, substance " abuse. Plan - General sx consult, GI consult. IVF. SSE x 2 , avoid oral [prep for c scope tomorrow. Pain control. Prn antiemetics . CIWA monitoring.   1/7 update - Abnormal EKG with T wave inversion in the lateral leads V3 to V6, patient has no chest pain, troponin x 1 negative, admits to have use cocaine recently will check urine drug screen, check 2D echocardiogram . CT of the chest to rule out metastasis    GI quick note- plans for limited colonoscopy tomorrow.  Give enemas till clear.  Not sure if he will be able to handle oral bowel prep because of significant distention of the colon as well as possible partial bowel obstruction.  May need urgent intervention because of bowel obstruction.  May need urgent bowel resection and/or colostomy.     Date: 1/8    Day 2:  General sx consult- p/w no BM for 3 weeks and worsening abdominal pain with N/V, found to have sigmoid mass on CT c/f malignancy . feeling better since admission, abdominal pain is better and nausea/vomiting has resolved . Abdomen remains bloated . On exam, abdomen firm with palpable stool burden, non-tender, hypoactive bowel sounds . has had some Bms with enemas . BP elevated . GI plans for C scope today. Surgical planning after colonoscopy; pt will need partial colectomy . Echo pending .    Update Gen'l sx - will need emergency laparotomy tomorrow and a possible Jo's procedure.  Wwill not be able to prep,pt's bowel is completely obstructed hence anastomosis is not feasible.  Patient will end up with a temporary colostomy. . CXR and CEA ordered . Await cardio clearance  . Ordered NGT insertion  ti LIS     GI consult- reports passage of gas/stool w/ enemas. No N/V since yesterday. Keep NPO, SSE x 2 . C scope for today, attempt bx during C scope. Will need CEA, CT chest pending course .  Medicine- C/o abdominal pain, nausea . Phos 2.6 . . Labs UDS + cocaine, opiates . pt did receive Oral Oxy and IV dilaudid prior to drug screen CIWA yesterday  6-->4--->1 --->0 today .  Pt refusing any info on substance abuse treatment . BP persistently elevated , no prior hx HTN . Continue to monitor  Pt ordered telemetry this afternoon    S/p soap sudds enema x3. Originally planned for Flex Sig this afternoon but after further evaluation, Surgery will obtain tissue samples during surgical procedure   For OR tomorrow- Cardiology  consult placed for cardiology clearance    ED Triage Vitals [01/07/24 1114]   Temperature Pulse Respirations Blood Pressure SpO2   98 °F (36.7 °C) (!) 132 20 156/71 98 %      Temp Source Heart Rate Source Patient Position - Orthostatic VS BP Location FiO2 (%)   Oral Monitor Sitting Left arm --      Pain Score       No Pain          Wt Readings from Last 1 Encounters:   01/08/24 72.1 kg (159 lb)     Additional Vital Signs:    Date/Time Temp Pulse Resp BP MAP (mmHg) SpO2   01/08/24 1126 -- 90 16 153/105 Abnormal  124 97 %   01/08/24 0732 98 °F (36.7 °C) 87 18 144/98 113 96 %   01/08/24 0259 98.4 °F (36.9 °C) 88 17 155/105 Abnormal  124 97 %   01/07/24 2311 98.6 °F (37 °C) 94 17 142/105 Abnormal  118 95 %   01/07/24 1930 98.6 °F (37 °C) 100 17 139/102 Abnormal  -- --   01/07/24 1920 -- -- -- -- -- --   01/07/24 1600 98 °F (36.7 °C) 103 16 147/92 -- --   01/07/24 1509 -- -- -- -- -- --   01/07/24 1413 98.1 °F (36.7 °C) 99 16 159/108 Abnormal  124 95 %   01/07/24 1342 -- 94 17 160/103 Abnormal  -- 98 %     CIWA-Ar Score    Row Name 01/08/24 1126 01/08/24 0732 01/08/24 0259 01/07/24 2311 01/07/24 1930   CIWA-Ar   /105 Abnormal   -/98  -/105 Abnormal   -/105 Abnormal   -/102 Abnormal   -CB   Pulse 90  -AV 87  -AV 88  -CL 94  -  -CB   Nausea and Vomiting -- -- 0  -CB 0  -CB 1  -NS   Tactile Disturbances -- -- 0  -CB 0  -CB 0  -NS   Tremor -- -- 0  -CB 0  -CB 0  -NS   Auditory Disturbances -- -- 0  -CB 0  -CB 0  -NS   Paroxysmal Sweats -- -- 0  -CB 0  -CB 1  -NS   Visual Disturbances -- -- 0  -CB 0  -CB 0  -NS   Anxiety  -- -- 0  -CB 1  -CB 1  -NS   Headache, Fullness in Head -- -- 0  -CB 0  -CB 0  -NS   Agitation -- -- 0  -CB 0  -CB 0  -NS   Orientation and Clouding of Sensorium -- -- 0  -CB 0  -CB 1  -NS   CIWA-Ar Total -- -- 0  -CB 1  -CB 4  -NS   Row Name 01/07/24 1600 01/07/24 1413 01/07/24 1342 01/07/24 1114    CIWA-Ar   /92  -/108 Abnormal   -/103 Abnormal   -/71  -EN    Pulse 103  -NS 99  -AV 94  - Abnormal   -EN    Nausea and Vomiting 3  -NS 3  -NS -- --    Tactile Disturbances 0  -NS 0  -NS -- --    Tremor 0  -NS 0  -NS -- --    Auditory Disturbances 0  -NS 0  -NS -- --    Paroxysmal Sweats 0  -NS 0  -NS -- --    Visual Disturbances 0  -NS 0  -NS -- --    Anxiety 3  -NS 3  -NS -- --    Headache, Fullness in Head 0  -NS 0  -NS -- --    Agitation 0  -NS 0  -NS -- --    Orientation and Clouding of Sensorium 0  -NS 0  -NS -- --    CIWA-Ar Total 6  -NS 6  -NS        Pertinent Labs/Diagnostic Test Results:   1/8   CXR- Right base infiltrate   CT chest- not read yet  1/8 echo- TTE-    Left Ventricle: Left ventricular cavity size is normal. Wall thickness is increased. There is mild to moderate concentric hypertrophy. The left ventricular ejection fraction is 65%. Systolic function is normal. Wall motion is normal. Diastolic function is normal.    Left Atrium: The atrium is mildly dilated.    Tricuspid Valve: There is mild regurgitation.    Aorta: The aortic root is mildly dilated. The aortic root is 4.10 cm.       1/6/24 previous ED visit- CT A/P Partial colonic obstruction secondary to a 7 cm long apple core lesion of the mid sigmoid colon suspicious for colonic malignancy.       Results from last 7 days   Lab Units 01/08/24  0449 01/06/24  1206   WBC Thousand/uL 7.01 7.68   HEMOGLOBIN g/dL 13.2 15.3   HEMATOCRIT % 39.3 44.4   PLATELETS Thousands/uL 353 371   NEUTROS ABS Thousands/µL 4.38 4.75         Results from last 7 days   Lab Units 01/08/24  0449 01/06/24  1206   SODIUM mmol/L 139 133*    POTASSIUM mmol/L 4.2 3.6   CHLORIDE mmol/L 105 100   CO2 mmol/L 27 23   ANION GAP mmol/L 7 10   BUN mg/dL 20 14   CREATININE mg/dL 1.01 0.92   EGFR ml/min/1.73sq m 85 95   CALCIUM mg/dL 8.5 9.2   MAGNESIUM mg/dL 2.0  --    PHOSPHORUS mg/dL 2.6*  --      Results from last 7 days   Lab Units 01/08/24  0449 01/06/24  1206   AST U/L 11* 16   ALT U/L 11 17   ALK PHOS U/L 51 60   TOTAL PROTEIN g/dL 6.5 7.4   ALBUMIN g/dL 3.5 3.9   TOTAL BILIRUBIN mg/dL 0.73 0.60         Results from last 7 days   Lab Units 01/08/24  0449 01/06/24  1206   GLUCOSE RANDOM mg/dL 83 110           Results from last 7 days   Lab Units 01/07/24  1931 01/07/24  1749 01/07/24  1532   HS TNI 0HR ng/L  --   --  7   HS TNI 2HR ng/L  --  7  --    HSTNI D2 ng/L  --  0  --    HS TNI 4HR ng/L 7  --   --    HSTNI D4 ng/L 0  --   --          Results from last 7 days   Lab Units 01/08/24  0449   PROTIME seconds 16.5*   INR  1.35*     Results from last 7 days   Lab Units 01/06/24  1206   TSH 3RD GENERATON uIU/mL 1.485     Results from last 7 days   Lab Units 01/06/24  1206   PROCALCITONIN ng/ml 0.08               Results from last 7 days   Lab Units 01/06/24  1206   LIPASE u/L 10*               Results from last 7 days   Lab Units 01/07/24  1817   AMPH/METH  Negative   BARBITURATE UR  Negative   BENZODIAZEPINE UR  Negative   COCAINE UR  Positive*   METHADONE URINE  Negative   OPIATE UR  Positive*   PCP UR  Negative   THC UR  Negative                 ED Treatment:   Medication Administration from 01/07/2024 1106 to 01/07/2024 1412       None          History reviewed. No pertinent past medical history.  Present on Admission:  **None**      Admitting Diagnosis: Colonic obstruction (HCC) [K56.609]  Constipation [K59.00]  Colonic mass [K63.89]  Age/Sex: 53 y.o. male  Admission Orders:  Scheduled Medications:  enoxaparin, 40 mg, Subcutaneous, Daily  nicotine, 1 patch, Transdermal, Daily      Continuous IV Infusions:  multi-electrolyte, 150 mL/hr, Intravenous,  Continuous      PRN Meds:  acetaminophen, 650 mg, Oral, Q6H PRN  HYDROmorphone, 0.5 mg, Intravenous, Q2H PRN x2 1/7, x2 1/8   naloxone, 0.04 mg, Intravenous, Q1MIN PRN  ondansetron, 4 mg, Intravenous, Q6H PRN x 1   1 /7   oxyCODONE, 2.5 mg, Oral, Q4H PRN   Or  oxyCODONE, 5 mg, Oral, Q4H PRN x3 1/7      NPO 1/8/24 @ 0001   CIWA    Continuous pulse ox     SSE x 11 /8   Telemetry- 1/8         IP CONSULT TO GASTROENTEROLOGY  IP CONSULT TO ACUTE CARE SURGERY    Network Utilization Review Department  ATTENTION: Please call with any questions or concerns to [phone] and carefully listen to the prompts so that you are directed to the right person. All voicemails are confidential.   For Discharge needs, contact Care Management DC Support Team at [phone] opt. [phone]  Send all requests for admission clinical reviews, approved or denied determinations and any other requests to dedicated fax number below belonging to the campus where the patient is receiving treatment. List of dedicated fax numbers for the Facilities:  FACILITY NAME UR FAX NUMBER   ADMISSION DENIALS (Administrative/Medical Necessity) [phone]   DISCHARGE SUPPORT TEAM (NETWORK) [phone]   PARENT CHILD HEALTH (Maternity/NICU/Pediatrics) [phone]   General acute hospital [phone]   [facility] [phone]   [facility] [phone]   [facility] [phone]   [facility] [phone]   [facility] [phone]   [facility] [phone]   [facility] [phone]   [facility] [phone]   [facility] [phone]   [facility] [phone]

## 2024-01-08 NOTE — CASE MANAGEMENT
Case Management Assessment    Patient name Davis Goss  Location /-01 MRN [id number]  : 1970 Date 2024       Current Admission Date: 2024  Current Admission Diagnosis:Colonic mass   Patient Active Problem List    Diagnosis Date Noted    Colonic mass 2024    Tobacco abuse 2024    Substance use 2024      LOS (days): 1  Geometric Mean LOS (GMLOS) (days):   Days to GMLOS:     OBJECTIVE:    Risk of Unplanned Readmission Score: 13.48         Current admission status: Inpatient       Preferred Pharmacy:   [id number] - [place] [id number] [address]  Phone: [phone] Fax: [phone]    Primary Care Provider: No primary care provider on file.    Primary Insurance: BLUE CROSS  Secondary Insurance:     ASSESSMENT:  Active Health Care Proxies    There are no active Health Care Proxies on file.                 Readmission Root Cause  30 Day Readmission: No    Patient Information  Admitted from:: Home  Mental Status: Alert  During Assessment patient was accompanied by: Not accompanied during assessment  Assessment information provided by:: Patient  Support Systems: Self  Home entry access options. Select all that apply.: Stairs  Number of steps to enter home.:  (unknown-unwilling to provide)  Do the steps have railings?:  (unknown-unwilling to provide)  Type of Current Residence: Apartment  Floor Level: 1  Upon entering residence, is there a bedroom on the main floor (no further steps)?: Yes  Upon entering residence, is there a bathroom on the main floor (no further steps)?: Yes  Living Arrangements: Lives Alone    Activities of Daily Living Prior to Admission  Functional Status: Independent  Completes ADLs independently?: Yes  Ambulates independently?: Yes  Does patient use assisted devices?: No  Does patient currently own DME?: No  Does patient have a history of Outpatient Therapy (PT/OT)?: No  Does the patient have a  history of Short-Term Rehab?: No  Does patient have a history of HHC?: No  Does patient currently have HHC?: No         Patient Information Continued  Income Source: Unknown  Does patient have prescription coverage?: Yes (says he has, but has never filled prescription no preferred pharmacy.)  Does patient receive dialysis treatments?: No  Does patient have a history of substance abuse?: Yes  Historical substance use preference: Cocaine  History of Withdrawal Symptoms: Denies past symptoms  Is patient currently in treatment for substance abuse?: No. Patient declined treatment information.  Does patient have a history of Mental Health Diagnosis?: No     Patient is refusing and information on substance abuse treatment. Cm will follow for dc needs.           Housing Stability: Not on file   Food Insecurity: Not on file   Transportation Needs: Not on file   Utilities: Not on file

## 2024-01-08 NOTE — PROGRESS NOTES
[facility]  Progress Note  Name: Davis Goss I  MRN: [id number]  Unit/Bed#: -Sai I Date of Admission: 1/7/2024   Date of Service: 1/8/2024 I Hospital Day: 1    Assessment/Plan   * Colonic mass  Assessment & Plan  Patient presented to the ED on 1/6 for complaints of ongoing constipation accompanied by weight loss of there past year. Patient stating at the end of December he had loss of appetite due to nausea and vomiting following each meal. Workup identified colonic mass. Pt left AMA but returned on 1/7 for further evaluation.   CT A/P: Partial colonic obstruction secondary to a 7 cm long apple core lesion of the mid sigmoid colon suspicious for colonic malignancy   With poor oral intake, now NPO. C/w IVF  GI Consulted  S/p soap sudds enema x3. Originally planned for Flex Sig this afternoon but after further evaluation, Surgery will obtain tissue samples during surgical procedure  Will need CEA, CT Chest and oncology follow up pending course  General Surgery Consulted  Plan for Emergent Laparotomy and possible Jo's procedure tomorrow. Unable to prep due to complete obstruction, anastomosis is not feasible. Pt will end up with Temporary Colostomy  Awaiting Cardiac Clearance  With abnormal EKG on admission  Echo: Ordered  Cardiology Consulted for Cardiac clearance  Continue supportive care  Pain management, and Anti-emetics PRN    Elevated blood pressure reading  Assessment & Plan  Pt with notable HTN on admission, Initially believed secondary to abdominal pain with nausea and vomiting but BP now persistently elevated.   No prior history of HTN  Monitor While admitted  Continue pain management    Substance use  Assessment & Plan  Pt stated he drinks liquor and beer multiple times a week. Unable to specify quantity.  Pt also noted occasional marijuana and coke use   UDS: (+) Opiates and Cocaine  It appears pt did receive Oral Oxy and IV dilaudid prior to drug screen  Continue CIWA  protocol  Continue supportive care    Tobacco abuse  Assessment & Plan  PT smokes 1/2 PPD  Per pt, he does not need NRT because he only smokes because its there  NRT while admitted  Educated on smoking cessation               VTE Pharmacologic Prophylaxis: VTE Score: 3 Moderate Risk (Score 3-4) - Pharmacological DVT Prophylaxis Ordered: enoxaparin (Lovenox).    Mobility:   Basic Mobility Inpatient Raw Score: 24  JH-HLM Goal: 8: Walk 250 feet or more  JH-HLM Achieved: 8: Walk 250 feet ot more  HLM Goal achieved. Continue to encourage appropriate mobility.    Patient Centered Rounds: I performed bedside rounds with nursing staff today.   Discussions with Specialists or Other Care Team Provider: GI, Gen Sx    Education and Discussions with Family / Patient: Updated  (mother) at bedside.    Total Time Spent on Date of Encounter in care of patient: 45 mins. This time was spent on one or more of the following: performing physical exam; counseling and coordination of care; obtaining or reviewing history; documenting in the medical record; reviewing/ordering tests, medications or procedures; communicating with other healthcare professionals and discussing with patient's family/caregivers.    Current Length of Stay: 1 day(s)  Current Patient Status: Inpatient   Certification Statement: The patient will continue to require additional inpatient hospital stay due to Colonic mass requiring Surgical intervention   Discharge Plan: Anticipate discharge in >72 hrs to home with home services.    Code Status: Level 1 - Full Code    Subjective:   Patient complaining of abdominal pain with nausea but no episodes of vomiting. Patient denies any chest pain or shortness of breath.     Objective:     Vitals:   Temp (24hrs), Av.5 °F (36.9 °C), Min:98 °F (36.7 °C), Max:99.1 °F (37.3 °C)    Temp:  [98 °F (36.7 °C)-99.1 °F (37.3 °C)] 99.1 °F (37.3 °C)  HR:  [] 83  Resp:  [16-18] 17  BP: (139-155)/() 151/112  SpO2:   [95 %-97 %] 96 %  Body mass index is 22.18 kg/m².     Input and Output Summary (last 24 hours):     Intake/Output Summary (Last 24 hours) at 1/8/2024 1811  Last data filed at 1/8/2024 1748  Gross per 24 hour   Intake 2240 ml   Output 400 ml   Net 1840 ml       Physical Exam:   Physical Exam  Vitals and nursing note reviewed.   Constitutional:       General: He is not in acute distress.  HENT:      Head: Normocephalic.      Nose: Nose normal. No congestion.      Mouth/Throat:      Mouth: Mucous membranes are moist.      Pharynx: Oropharynx is clear.   Cardiovascular:      Rate and Rhythm: Normal rate and regular rhythm.      Pulses: Normal pulses.      Heart sounds: Normal heart sounds. No murmur heard.  Pulmonary:      Effort: Pulmonary effort is normal. No respiratory distress.      Breath sounds: Normal breath sounds.   Abdominal:      General: Bowel sounds are normal. There is distension.      Tenderness: There is abdominal tenderness.   Musculoskeletal:         General: Normal range of motion.      Cervical back: Normal range of motion.      Right lower leg: No edema.      Left lower leg: No edema.   Skin:     General: Skin is warm and dry.      Capillary Refill: Capillary refill takes less than 2 seconds.   Neurological:      Mental Status: He is alert and oriented to person, place, and time. Mental status is at baseline.      Motor: No weakness.   Psychiatric:         Mood and Affect: Affect is flat.         Speech: Speech normal.         Behavior: Behavior is cooperative.          Additional Data:     Labs:  Results from last 7 days   Lab Units 01/08/24  0449   WBC Thousand/uL 7.01   HEMOGLOBIN g/dL 13.2   HEMATOCRIT % 39.3   PLATELETS Thousands/uL 353   NEUTROS PCT % 63   LYMPHS PCT % 20   MONOS PCT % 16*   EOS PCT % 1     Results from last 7 days   Lab Units 01/08/24  0449   SODIUM mmol/L 139   POTASSIUM mmol/L 4.2   CHLORIDE mmol/L 105   CO2 mmol/L 27   BUN mg/dL 20   CREATININE mg/dL 1.01   ANION GAP  mmol/L 7   CALCIUM mg/dL 8.5   ALBUMIN g/dL 3.5   TOTAL BILIRUBIN mg/dL 0.73   ALK PHOS U/L 51   ALT U/L 11   AST U/L 11*   GLUCOSE RANDOM mg/dL 83     Results from last 7 days   Lab Units 01/08/24  0449   INR  1.35*             Results from last 7 days   Lab Units 01/06/24  1206   PROCALCITONIN ng/ml 0.08       Lines/Drains:  Invasive Devices       Peripheral Intravenous Line  Duration             Peripheral IV 01/07/24 Left;Ventral (anterior) Forearm 1 day              Drain  Duration             NG/OG/Enteral Tube Nasogastric 12 Fr Right nare <1 day                      Telemetry:  Telemetry Orders (From admission, onward)               24 Hour Telemetry Monitoring  Continuous x 24 Hours (Telem)        Question:  Reason for 24 Hour Telemetry  Answer:  Metabolic/electrolyte disturbance with high probability of dysrhythmia. K level <3 or >6 OR KCL infusion >10mEq/hr                     Telemetry Reviewed: Normal Sinus Rhythm  Indication for Continued Telemetry Use: Metabolic/electrolyte disturbance with high probability of dysrhythmia             Imaging: Reviewed radiology reports from this admission including: chest xray    Recent Cultures (last 7 days):         Last 24 Hours Medication List:   Current Facility-Administered Medications   Medication Dose Route Frequency Provider Last Rate    acetaminophen  650 mg Oral Q6H PRN WES Peng      enoxaparin  40 mg Subcutaneous Daily WES Peng      HYDROmorphone  0.5 mg Intravenous Q2H PRN Jolie Heller MD      multi-electrolyte  75 mL/hr Intravenous Continuous WES Peng 75 mL/hr (01/08/24 7078)    naloxone  0.04 mg Intravenous Q1MIN PRN WES Peng      nicotine  1 patch Transdermal Daily WES Peng      ondansetron  4 mg Intravenous Q6H PRN WES Peng      oxyCODONE  2.5 mg Oral Q4H PRN WES Peng      Or    oxyCODONE  5 mg Oral Q4H PRN WES Peng          Today, Patient Was Seen By: Vargas  WES Valdez    **Please Note: This note may have been constructed using a voice recognition system.**

## 2024-01-09 ENCOUNTER — ANESTHESIA EVENT (INPATIENT)
Dept: PERIOP | Facility: HOSPITAL | Age: 54
End: 2024-01-09
Payer: COMMERCIAL

## 2024-01-09 ENCOUNTER — ANESTHESIA (INPATIENT)
Dept: PERIOP | Facility: HOSPITAL | Age: 54
End: 2024-01-09
Payer: COMMERCIAL

## 2024-01-09 PROBLEM — K56.609 COLONIC OBSTRUCTION (HCC): Status: ACTIVE | Noted: 2024-01-09

## 2024-01-09 PROBLEM — F17.200 SMOKING: Status: ACTIVE | Noted: 2024-01-07

## 2024-01-09 PROBLEM — IMO0001 SMOKING: Status: ACTIVE | Noted: 2024-01-07

## 2024-01-09 LAB
ANION GAP SERPL CALCULATED.3IONS-SCNC: 13 MMOL/L
ANION GAP SERPL CALCULATED.3IONS-SCNC: 17 MMOL/L
BUN SERPL-MCNC: 19 MG/DL (ref 5–25)
BUN SERPL-MCNC: 21 MG/DL (ref 5–25)
CALCIUM SERPL-MCNC: 8.1 MG/DL (ref 8.4–10.2)
CALCIUM SERPL-MCNC: 8.6 MG/DL (ref 8.4–10.2)
CHLORIDE SERPL-SCNC: 101 MMOL/L (ref 96–108)
CHLORIDE SERPL-SCNC: 101 MMOL/L (ref 96–108)
CO2 SERPL-SCNC: 22 MMOL/L (ref 21–32)
CO2 SERPL-SCNC: 23 MMOL/L (ref 21–32)
CREAT SERPL-MCNC: 0.97 MG/DL (ref 0.6–1.3)
CREAT SERPL-MCNC: 1.01 MG/DL (ref 0.6–1.3)
ERYTHROCYTE [DISTWIDTH] IN BLOOD BY AUTOMATED COUNT: 15.4 % (ref 11.6–15.1)
EST. AVERAGE GLUCOSE BLD GHB EST-MCNC: 111 MG/DL
GFR SERPL CREATININE-BSD FRML MDRD: 85 ML/MIN/1.73SQ M
GFR SERPL CREATININE-BSD FRML MDRD: 90 ML/MIN/1.73SQ M
GLUCOSE SERPL-MCNC: 114 MG/DL (ref 65–140)
GLUCOSE SERPL-MCNC: 134 MG/DL (ref 65–140)
GLUCOSE SERPL-MCNC: 59 MG/DL (ref 65–140)
HBA1C MFR BLD: 5.5 %
HCT VFR BLD AUTO: 36.5 % (ref 36.5–49.3)
HGB BLD-MCNC: 12.1 G/DL (ref 12–17)
MCH RBC QN AUTO: 28.8 PG (ref 26.8–34.3)
MCHC RBC AUTO-ENTMCNC: 33.2 G/DL (ref 31.4–37.4)
MCV RBC AUTO: 87 FL (ref 82–98)
PLATELET # BLD AUTO: 290 THOUSANDS/UL (ref 149–390)
PMV BLD AUTO: 10.1 FL (ref 8.9–12.7)
POTASSIUM SERPL-SCNC: 3.9 MMOL/L (ref 3.5–5.3)
POTASSIUM SERPL-SCNC: 4.5 MMOL/L (ref 3.5–5.3)
RBC # BLD AUTO: 4.2 MILLION/UL (ref 3.88–5.62)
SODIUM SERPL-SCNC: 137 MMOL/L (ref 135–147)
SODIUM SERPL-SCNC: 140 MMOL/L (ref 135–147)
WBC # BLD AUTO: 13.03 THOUSAND/UL (ref 4.31–10.16)

## 2024-01-09 PROCEDURE — 80048 BASIC METABOLIC PNL TOTAL CA: CPT | Performed by: PHYSICIAN ASSISTANT

## 2024-01-09 PROCEDURE — 0DTN0ZZ RESECTION OF SIGMOID COLON, OPEN APPROACH: ICD-10-PCS | Performed by: SURGERY

## 2024-01-09 PROCEDURE — 82948 REAGENT STRIP/BLOOD GLUCOSE: CPT

## 2024-01-09 PROCEDURE — C1765 ADHESION BARRIER: HCPCS | Performed by: SURGERY

## 2024-01-09 PROCEDURE — 0D1M0Z4 BYPASS DESCENDING COLON TO CUTANEOUS, OPEN APPROACH: ICD-10-PCS | Performed by: SURGERY

## 2024-01-09 PROCEDURE — 0TQB0ZZ REPAIR BLADDER, OPEN APPROACH: ICD-10-PCS | Performed by: SURGERY

## 2024-01-09 PROCEDURE — 85027 COMPLETE CBC AUTOMATED: CPT | Performed by: PHYSICIAN ASSISTANT

## 2024-01-09 PROCEDURE — 80048 BASIC METABOLIC PNL TOTAL CA: CPT

## 2024-01-09 PROCEDURE — 99232 SBSQ HOSP IP/OBS MODERATE 35: CPT | Performed by: PHYSICIAN ASSISTANT

## 2024-01-09 PROCEDURE — 94664 DEMO&/EVAL PT USE INHALER: CPT

## 2024-01-09 PROCEDURE — NC001 PR NO CHARGE: Performed by: SURGERY

## 2024-01-09 PROCEDURE — 94760 N-INVAS EAR/PLS OXIMETRY 1: CPT

## 2024-01-09 PROCEDURE — 88341 IMHCHEM/IMCYTCHM EA ADD ANTB: CPT | Performed by: PATHOLOGY

## 2024-01-09 PROCEDURE — 44143 PARTIAL REMOVAL OF COLON: CPT | Performed by: SURGERY

## 2024-01-09 PROCEDURE — 88307 TISSUE EXAM BY PATHOLOGIST: CPT | Performed by: PATHOLOGY

## 2024-01-09 PROCEDURE — 88342 IMHCHEM/IMCYTCHM 1ST ANTB: CPT | Performed by: PATHOLOGY

## 2024-01-09 PROCEDURE — 99024 POSTOP FOLLOW-UP VISIT: CPT | Performed by: SURGERY

## 2024-01-09 RX ORDER — ONDANSETRON 2 MG/ML
INJECTION INTRAMUSCULAR; INTRAVENOUS AS NEEDED
Status: DISCONTINUED | OUTPATIENT
Start: 2024-01-09 | End: 2024-01-09

## 2024-01-09 RX ORDER — SODIUM CHLORIDE, SODIUM LACTATE, POTASSIUM CHLORIDE, CALCIUM CHLORIDE 600; 310; 30; 20 MG/100ML; MG/100ML; MG/100ML; MG/100ML
75 INJECTION, SOLUTION INTRAVENOUS CONTINUOUS
Status: DISCONTINUED | OUTPATIENT
Start: 2024-01-09 | End: 2024-01-09

## 2024-01-09 RX ORDER — SODIUM CHLORIDE, SODIUM LACTATE, POTASSIUM CHLORIDE, CALCIUM CHLORIDE 600; 310; 30; 20 MG/100ML; MG/100ML; MG/100ML; MG/100ML
75 INJECTION, SOLUTION INTRAVENOUS CONTINUOUS
Status: DISCONTINUED | OUTPATIENT
Start: 2024-01-09 | End: 2024-01-11

## 2024-01-09 RX ORDER — FENTANYL CITRATE 50 UG/ML
INJECTION, SOLUTION INTRAMUSCULAR; INTRAVENOUS AS NEEDED
Status: DISCONTINUED | OUTPATIENT
Start: 2024-01-09 | End: 2024-01-09

## 2024-01-09 RX ORDER — METRONIDAZOLE 500 MG/100ML
500 INJECTION, SOLUTION INTRAVENOUS EVERY 8 HOURS
Status: DISCONTINUED | OUTPATIENT
Start: 2024-01-09 | End: 2024-01-16

## 2024-01-09 RX ORDER — MIDAZOLAM HYDROCHLORIDE 2 MG/2ML
INJECTION, SOLUTION INTRAMUSCULAR; INTRAVENOUS AS NEEDED
Status: DISCONTINUED | OUTPATIENT
Start: 2024-01-09 | End: 2024-01-09

## 2024-01-09 RX ORDER — ROCURONIUM BROMIDE 10 MG/ML
INJECTION, SOLUTION INTRAVENOUS AS NEEDED
Status: DISCONTINUED | OUTPATIENT
Start: 2024-01-09 | End: 2024-01-09

## 2024-01-09 RX ORDER — CEFAZOLIN SODIUM 1 G/50ML
1000 SOLUTION INTRAVENOUS EVERY 8 HOURS
Status: DISCONTINUED | OUTPATIENT
Start: 2024-01-09 | End: 2024-01-16

## 2024-01-09 RX ORDER — SUCCINYLCHOLINE/SOD CL,ISO/PF 100 MG/5ML
SYRINGE (ML) INTRAVENOUS AS NEEDED
Status: DISCONTINUED | OUTPATIENT
Start: 2024-01-09 | End: 2024-01-09

## 2024-01-09 RX ORDER — PROMETHAZINE HYDROCHLORIDE 25 MG/ML
6.25 INJECTION, SOLUTION INTRAMUSCULAR; INTRAVENOUS ONCE
Status: DISCONTINUED | OUTPATIENT
Start: 2024-01-09 | End: 2024-01-09 | Stop reason: HOSPADM

## 2024-01-09 RX ORDER — SODIUM CHLORIDE, SODIUM LACTATE, POTASSIUM CHLORIDE, CALCIUM CHLORIDE 600; 310; 30; 20 MG/100ML; MG/100ML; MG/100ML; MG/100ML
INJECTION, SOLUTION INTRAVENOUS CONTINUOUS PRN
Status: DISCONTINUED | OUTPATIENT
Start: 2024-01-09 | End: 2024-01-09

## 2024-01-09 RX ORDER — DIPHENHYDRAMINE HYDROCHLORIDE 50 MG/ML
25 INJECTION INTRAMUSCULAR; INTRAVENOUS EVERY 6 HOURS PRN
Status: DISCONTINUED | OUTPATIENT
Start: 2024-01-09 | End: 2024-01-20 | Stop reason: HOSPADM

## 2024-01-09 RX ORDER — ACETAMINOPHEN 10 MG/ML
1000 INJECTION, SOLUTION INTRAVENOUS EVERY 6 HOURS SCHEDULED
Status: DISCONTINUED | OUTPATIENT
Start: 2024-01-09 | End: 2024-01-16

## 2024-01-09 RX ORDER — ACETAMINOPHEN 10 MG/ML
1000 INJECTION, SOLUTION INTRAVENOUS ONCE
Status: COMPLETED | OUTPATIENT
Start: 2024-01-09 | End: 2024-01-09

## 2024-01-09 RX ORDER — METOCLOPRAMIDE HYDROCHLORIDE 5 MG/ML
5 INJECTION INTRAMUSCULAR; INTRAVENOUS EVERY 6 HOURS PRN
Status: DISCONTINUED | OUTPATIENT
Start: 2024-01-09 | End: 2024-01-20 | Stop reason: HOSPADM

## 2024-01-09 RX ORDER — INSULIN LISPRO 100 [IU]/ML
1-5 INJECTION, SOLUTION INTRAVENOUS; SUBCUTANEOUS EVERY 6 HOURS SCHEDULED
Status: DISCONTINUED | OUTPATIENT
Start: 2024-01-09 | End: 2024-01-09

## 2024-01-09 RX ORDER — KETAMINE HCL IN NACL, ISO-OSM 100MG/10ML
SYRINGE (ML) INJECTION AS NEEDED
Status: DISCONTINUED | OUTPATIENT
Start: 2024-01-09 | End: 2024-01-09

## 2024-01-09 RX ORDER — DEXAMETHASONE SODIUM PHOSPHATE 10 MG/ML
INJECTION, SOLUTION INTRAMUSCULAR; INTRAVENOUS AS NEEDED
Status: DISCONTINUED | OUTPATIENT
Start: 2024-01-09 | End: 2024-01-09

## 2024-01-09 RX ORDER — METRONIDAZOLE 500 MG/100ML
INJECTION, SOLUTION INTRAVENOUS CONTINUOUS PRN
Status: DISCONTINUED | OUTPATIENT
Start: 2024-01-09 | End: 2024-01-09

## 2024-01-09 RX ORDER — FENTANYL CITRATE/PF 50 MCG/ML
50 SYRINGE (ML) INJECTION
Status: DISCONTINUED | OUTPATIENT
Start: 2024-01-09 | End: 2024-01-09 | Stop reason: HOSPADM

## 2024-01-09 RX ORDER — ONDANSETRON 2 MG/ML
4 INJECTION INTRAMUSCULAR; INTRAVENOUS ONCE AS NEEDED
Status: DISCONTINUED | OUTPATIENT
Start: 2024-01-09 | End: 2024-01-09 | Stop reason: HOSPADM

## 2024-01-09 RX ORDER — HYDROMORPHONE HCL/PF 1 MG/ML
0.5 SYRINGE (ML) INJECTION
Status: DISCONTINUED | OUTPATIENT
Start: 2024-01-09 | End: 2024-01-09 | Stop reason: HOSPADM

## 2024-01-09 RX ORDER — LIDOCAINE HYDROCHLORIDE 10 MG/ML
INJECTION, SOLUTION EPIDURAL; INFILTRATION; INTRACAUDAL; PERINEURAL AS NEEDED
Status: DISCONTINUED | OUTPATIENT
Start: 2024-01-09 | End: 2024-01-09

## 2024-01-09 RX ORDER — KETOROLAC TROMETHAMINE 30 MG/ML
15 INJECTION, SOLUTION INTRAMUSCULAR; INTRAVENOUS EVERY 6 HOURS PRN
Status: DISCONTINUED | OUTPATIENT
Start: 2024-01-09 | End: 2024-01-11

## 2024-01-09 RX ORDER — PROPOFOL 10 MG/ML
INJECTION, EMULSION INTRAVENOUS AS NEEDED
Status: DISCONTINUED | OUTPATIENT
Start: 2024-01-09 | End: 2024-01-09

## 2024-01-09 RX ORDER — MAGNESIUM HYDROXIDE 1200 MG/15ML
LIQUID ORAL AS NEEDED
Status: DISCONTINUED | OUTPATIENT
Start: 2024-01-09 | End: 2024-01-09 | Stop reason: HOSPADM

## 2024-01-09 RX ADMIN — SODIUM CHLORIDE, SODIUM LACTATE, POTASSIUM CHLORIDE, AND CALCIUM CHLORIDE: .6; .31; .03; .02 INJECTION, SOLUTION INTRAVENOUS at 11:29

## 2024-01-09 RX ADMIN — SODIUM CHLORIDE 8 MCG: 9 INJECTION, SOLUTION INTRAVENOUS at 08:32

## 2024-01-09 RX ADMIN — SODIUM CHLORIDE, SODIUM LACTATE, POTASSIUM CHLORIDE, AND CALCIUM CHLORIDE: .6; .31; .03; .02 INJECTION, SOLUTION INTRAVENOUS at 07:20

## 2024-01-09 RX ADMIN — DEXAMETHASONE SODIUM PHOSPHATE 10 MG: 10 INJECTION, SOLUTION INTRAMUSCULAR; INTRAVENOUS at 08:22

## 2024-01-09 RX ADMIN — METRONIDAZOLE 500 MG: 500 INJECTION, SOLUTION INTRAVENOUS at 16:25

## 2024-01-09 RX ADMIN — METRONIDAZOLE: 500 INJECTION, SOLUTION INTRAVENOUS at 08:16

## 2024-01-09 RX ADMIN — SODIUM CHLORIDE, SODIUM LACTATE, POTASSIUM CHLORIDE, AND CALCIUM CHLORIDE 75 ML/HR: .6; .31; .03; .02 INJECTION, SOLUTION INTRAVENOUS at 13:54

## 2024-01-09 RX ADMIN — Medication 30 MG: at 08:18

## 2024-01-09 RX ADMIN — SODIUM CHLORIDE, SODIUM LACTATE, POTASSIUM CHLORIDE, AND CALCIUM CHLORIDE 100 ML/HR: .6; .31; .03; .02 INJECTION, SOLUTION INTRAVENOUS at 16:29

## 2024-01-09 RX ADMIN — SODIUM CHLORIDE 4 MCG: 9 INJECTION, SOLUTION INTRAVENOUS at 11:42

## 2024-01-09 RX ADMIN — HYDROMORPHONE HYDROCHLORIDE 0.25 MG: 1 INJECTION, SOLUTION INTRAMUSCULAR; INTRAVENOUS; SUBCUTANEOUS at 11:11

## 2024-01-09 RX ADMIN — FENTANYL CITRATE 50 MCG: 50 INJECTION INTRAMUSCULAR; INTRAVENOUS at 12:48

## 2024-01-09 RX ADMIN — Medication 20 MG: at 08:38

## 2024-01-09 RX ADMIN — SODIUM CHLORIDE 4 MCG: 9 INJECTION, SOLUTION INTRAVENOUS at 11:26

## 2024-01-09 RX ADMIN — FENTANYL CITRATE 50 MCG: 50 INJECTION INTRAMUSCULAR; INTRAVENOUS at 12:43

## 2024-01-09 RX ADMIN — HYDROMORPHONE HYDROCHLORIDE 0.5 MG: 1 INJECTION, SOLUTION INTRAMUSCULAR; INTRAVENOUS; SUBCUTANEOUS at 12:08

## 2024-01-09 RX ADMIN — CEFAZOLIN SODIUM 1000 MG: 1 SOLUTION INTRAVENOUS at 08:00

## 2024-01-09 RX ADMIN — ROCURONIUM BROMIDE 30 MG: 10 INJECTION, SOLUTION INTRAVENOUS at 09:57

## 2024-01-09 RX ADMIN — ROCURONIUM BROMIDE 30 MG: 10 INJECTION, SOLUTION INTRAVENOUS at 08:39

## 2024-01-09 RX ADMIN — HYDROMORPHONE HYDROCHLORIDE 0.5 MG: 1 INJECTION, SOLUTION INTRAMUSCULAR; INTRAVENOUS; SUBCUTANEOUS at 08:44

## 2024-01-09 RX ADMIN — SODIUM CHLORIDE 4 MCG: 9 INJECTION, SOLUTION INTRAVENOUS at 08:40

## 2024-01-09 RX ADMIN — SUGAMMADEX 200 MG: 100 INJECTION, SOLUTION INTRAVENOUS at 11:46

## 2024-01-09 RX ADMIN — SODIUM CHLORIDE 4 MCG: 9 INJECTION, SOLUTION INTRAVENOUS at 11:33

## 2024-01-09 RX ADMIN — LIDOCAINE HYDROCHLORIDE 50 MG: 10 INJECTION, SOLUTION EPIDURAL; INFILTRATION; INTRACAUDAL at 08:07

## 2024-01-09 RX ADMIN — ONDANSETRON 4 MG: 2 INJECTION INTRAMUSCULAR; INTRAVENOUS at 11:38

## 2024-01-09 RX ADMIN — ACETAMINOPHEN 1000 MG: 10 INJECTION INTRAVENOUS at 20:31

## 2024-01-09 RX ADMIN — HYDROMORPHONE HYDROCHLORIDE 0.25 MG: 1 INJECTION, SOLUTION INTRAMUSCULAR; INTRAVENOUS; SUBCUTANEOUS at 11:28

## 2024-01-09 RX ADMIN — FENTANYL CITRATE 50 MCG: 50 INJECTION, SOLUTION INTRAMUSCULAR; INTRAVENOUS at 08:40

## 2024-01-09 RX ADMIN — METRONIDAZOLE 500 MG: 500 INJECTION, SOLUTION INTRAVENOUS at 23:26

## 2024-01-09 RX ADMIN — ROCURONIUM BROMIDE 20 MG: 10 INJECTION, SOLUTION INTRAVENOUS at 08:22

## 2024-01-09 RX ADMIN — SODIUM CHLORIDE 8 MCG: 9 INJECTION, SOLUTION INTRAVENOUS at 08:20

## 2024-01-09 RX ADMIN — ROCURONIUM BROMIDE 20 MG: 10 INJECTION, SOLUTION INTRAVENOUS at 10:56

## 2024-01-09 RX ADMIN — Medication 180 MG: at 08:07

## 2024-01-09 RX ADMIN — PHENYLEPHRINE HYDROCHLORIDE 30 MCG/MIN: 10 INJECTION INTRAVENOUS at 08:19

## 2024-01-09 RX ADMIN — CEFAZOLIN SODIUM 1000 MG: 1 SOLUTION INTRAVENOUS at 23:20

## 2024-01-09 RX ADMIN — CEFAZOLIN SODIUM 1000 MG: 1 SOLUTION INTRAVENOUS at 15:45

## 2024-01-09 RX ADMIN — PROPOFOL 200 MG: 10 INJECTION, EMULSION INTRAVENOUS at 08:07

## 2024-01-09 RX ADMIN — SODIUM CHLORIDE, SODIUM LACTATE, POTASSIUM CHLORIDE, CALCIUM CHLORIDE: 600; 310; 30; 20 INJECTION, SOLUTION INTRAVENOUS at 09:55

## 2024-01-09 RX ADMIN — ACETAMINOPHEN 1000 MG: 10 INJECTION INTRAVENOUS at 11:07

## 2024-01-09 RX ADMIN — FENTANYL CITRATE 50 MCG: 50 INJECTION, SOLUTION INTRAMUSCULAR; INTRAVENOUS at 08:07

## 2024-01-09 RX ADMIN — HYDROMORPHONE HYDROCHLORIDE: 10 INJECTION, SOLUTION INTRAMUSCULAR; INTRAVENOUS; SUBCUTANEOUS at 13:06

## 2024-01-09 RX ADMIN — MIDAZOLAM HYDROCHLORIDE 2 MG: 1 INJECTION, SOLUTION INTRAMUSCULAR; INTRAVENOUS at 08:00

## 2024-01-09 RX ADMIN — SODIUM CHLORIDE, SODIUM LACTATE, POTASSIUM CHLORIDE, CALCIUM CHLORIDE: 600; 310; 30; 20 INJECTION, SOLUTION INTRAVENOUS at 08:15

## 2024-01-09 RX ADMIN — ACETAMINOPHEN 1000 MG: 10 INJECTION INTRAVENOUS at 15:02

## 2024-01-09 RX ADMIN — HYDRALAZINE HYDROCHLORIDE 5 MG: 20 INJECTION INTRAMUSCULAR; INTRAVENOUS at 03:12

## 2024-01-09 NOTE — ANESTHESIA PREPROCEDURE EVALUATION
"Procedure:  HARTMANS PROCEDURE (Hip)    Relevant Problems   ANESTHESIA (within normal limits)      CARDIO (within normal limits)      ENDO (within normal limits)      GI/HEPATIC (within normal limits)      /RENAL (within normal limits)      MUSCULOSKELETAL (within normal limits)      PULMONARY   (+) Smoking      Other   (+) Colonic mass   (+) Elevated blood pressure reading        Physical Exam    Airway    Mallampati score: II  TM Distance: >3 FB  Neck ROM: full     Dental   Comment: Poor oral hygiene. Denies loose teeth      Cardiovascular  Cardiovascular exam normal    Pulmonary  Pulmonary exam normal     Other Findings        Anesthesia Plan  ASA Score- 3     Anesthesia Type- general with ASA Monitors.         Additional Monitors:     Airway Plan: ETT.           Plan Factors-Exercise tolerance (METS): >4 METS.    Chart reviewed. EKG reviewed.  Existing labs reviewed. Patient summary reviewed.    Patient is a current smoker.  Patient instructed to abstain from smoking on day of procedure. Patient did not smoke on day of surgery.            Induction- intravenous.    Postoperative Plan- Plan for postoperative opioid use.     Informed Consent- Anesthetic plan and risks discussed with patient.  I personally reviewed this patient with the CRNA. Discussed and agreed on the Anesthesia Plan with the CRNA..              No results found for: \"HGBA1C\"    Lab Results   Component Value Date    K 4.2 01/08/2024     01/08/2024    CO2 27 01/08/2024    BUN 20 01/08/2024    CREATININE 1.01 01/08/2024    CALCIUM 8.5 01/08/2024    AST 11 (L) 01/08/2024    ALT 11 01/08/2024    ALKPHOS 51 01/08/2024    EGFR 85 01/08/2024       Lab Results   Component Value Date    WBC 7.01 01/08/2024    HGB 13.2 01/08/2024    HCT 39.3 01/08/2024    MCV 85 01/08/2024     01/08/2024   Echo 1/8/2024    Left Ventricle: Left ventricular cavity size is normal. Wall thickness is increased. There is mild to moderate concentric hypertrophy. " The left ventricular ejection fraction is 65%. Systolic function is normal. Wall motion is normal. Diastolic function is normal.    Left Atrium: The atrium is mildly dilated.    Tricuspid Valve: There is mild regurgitation.    Aorta: The aortic root is mildly dilated. The aortic root is 4.10 cm.

## 2024-01-09 NOTE — PROGRESS NOTES
"Progress Note - General Surgery   Davis Goss 53 y.o. male MRN: [id number]  Unit/Bed#: OR POOL Encounter: [id number]    ASSESSMENT:  52 y/o male with PMH polysubstance abuse (tobacco use, occasional cocaine use, ETOH use) p/w no BM for 3 weeks and worsening abdominal pain with N/V, found to have sigmoid mass on CT c/f malignancy     Partial colonic obstruction secondary to a sigmoid mass   --AVSS, hypertensive at times  --BMP, CBC WNL  --NGT OP 700cc  --UOP 850cc  --Flex sig with GI for bx cancelled yest due to procedure today  --ECHO yesterday with normal EF, 65%.  mild to moderate concentric hypertrophy. Otherwise norm systolic/diastolic fx and mall motion.  --CEA normal, 1.5  --T&S completed yest    PLAN:  --Plan for Jo's procedure today  --NPO  --IVF  --PRN analgesia/antiemetics   --DVT ppx: Lovenox  --Will need oncology follow up pending course  --FU chest CT results  --Appreciate GI recs  --IV Ancef/Flagyl given in preop for abx surg ppx        SUBJECTIVE:  Unknown rxn to PCN, says it was many years ago when he was a kid.  Abdominal pain here and there, generalized, \"the same as it's been\"    OBJECTIVE:   Vitals:  Blood pressure (!) 172/102, pulse 92, temperature 98.1 °F (36.7 °C), temperature source Temporal, resp. rate 18, height 5' 11\" (1.803 m), weight 71.1 kg (156 lb 12.8 oz), SpO2 98%.,Body mass index is 21.87 kg/m².    I/Os:    Intake/Output Summary (Last 24 hours) at 1/9/2024 0706  Last data filed at 1/9/2024 0725  Gross per 24 hour   Intake 1230 ml   Output 400 ml   Net 830 ml       Lines/Drains:  Invasive Devices       Peripheral Intravenous Line  Duration             Peripheral IV 01/07/24 Left;Ventral (anterior) Forearm 1 day              Drain  Duration             NG/OG/Enteral Tube Nasogastric 12 Fr Right nare <1 day                    Physical Exam  Vitals reviewed.   Constitutional:       General: He is not in acute distress.  HENT:      Head: Normocephalic and atraumatic. "   Cardiovascular:      Rate and Rhythm: Normal rate.   Pulmonary:      Effort: Pulmonary effort is normal.   Abdominal:      General: There is distension (tympanic).      Palpations: Abdomen is soft.      Tenderness: There is no abdominal tenderness. There is no guarding or rebound.   Musculoskeletal:      Cervical back: Neck supple.   Skin:     General: Skin is warm and dry.   Neurological:      Mental Status: He is alert.         Diagnostics:  I have personally reviewed pertinent lab results.     XR chest pa & lateral  Result Date: 1/8/2024  Impression: Right base infiltrate The study was marked in EPIC for immediate notification. Workstation performed: [id number]     Recent Results (from the past 36 hour(s))   Comprehensive metabolic panel    Collection Time: 01/08/24  4:49 AM   Result Value Ref Range    Sodium 139 135 - 147 mmol/L    Potassium 4.2 3.5 - 5.3 mmol/L    Chloride 105 96 - 108 mmol/L    CO2 27 21 - 32 mmol/L    ANION GAP 7 mmol/L    BUN 20 5 - 25 mg/dL    Creatinine 1.01 0.60 - 1.30 mg/dL    Glucose 83 65 - 140 mg/dL    Calcium 8.5 8.4 - 10.2 mg/dL    AST 11 (L) 13 - 39 U/L    ALT 11 7 - 52 U/L    Alkaline Phosphatase 51 34 - 104 U/L    Total Protein 6.5 6.4 - 8.4 g/dL    Albumin 3.5 3.5 - 5.0 g/dL    Total Bilirubin 0.73 0.20 - 1.00 mg/dL    eGFR 85 ml/min/1.73sq m   Magnesium    Collection Time: 01/08/24  4:49 AM   Result Value Ref Range    Magnesium 2.0 1.9 - 2.7 mg/dL   Phosphorus    Collection Time: 01/08/24  4:49 AM   Result Value Ref Range    Phosphorus 2.6 (L) 2.7 - 4.5 mg/dL   CBC and differential    Collection Time: 01/08/24  4:49 AM   Result Value Ref Range    WBC 7.01 4.31 - 10.16 Thousand/uL    RBC 4.64 3.88 - 5.62 Million/uL    Hemoglobin 13.2 12.0 - 17.0 g/dL    Hematocrit 39.3 36.5 - 49.3 %    MCV 85 82 - 98 fL    MCH 28.4 26.8 - 34.3 pg    MCHC 33.6 31.4 - 37.4 g/dL    RDW 15.0 11.6 - 15.1 %    MPV 10.4 8.9 - 12.7 fL    Platelets 353 149 - 390 Thousands/uL    nRBC 0 /100 WBCs     Neutrophils Relative 63 43 - 75 %    Immat GRANS % 0 0 - 2 %    Lymphocytes Relative 20 14 - 44 %    Monocytes Relative 16 (H) 4 - 12 %    Eosinophils Relative 1 0 - 6 %    Basophils Relative 0 0 - 1 %    Neutrophils Absolute 4.38 1.85 - 7.62 Thousands/µL    Immature Grans Absolute 0.01 0.00 - 0.20 Thousand/uL    Lymphocytes Absolute 1.42 0.60 - 4.47 Thousands/µL    Monocytes Absolute 1.10 0.17 - 1.22 Thousand/µL    Eosinophils Absolute 0.08 0.00 - 0.61 Thousand/µL    Basophils Absolute 0.02 0.00 - 0.10 Thousands/µL   Protime-INR    Collection Time: 01/08/24  4:49 AM   Result Value Ref Range    Protime 16.5 (H) 11.6 - 14.5 seconds    INR 1.35 (H) 0.84 - 1.19   Echo complete w/ contrast if indicated    Collection Time: 01/08/24  2:50 PM   Result Value Ref Range    Triscuspid Valve Regurgitation Peak Gradient 22.0 mmHg    RAA A4C 16.7 cm2    LA Volume Index (BP) 41.9 mL/m2    MV Peak A Joao 0.66 m/s    MV stenosis pressure 1/2 time 71 ms    MV Peak E Joao 44 cm/s    E wave deceleration time 244 ms    E/A ratio 0.67     MV valve area p 1/2 method 3.10     RA 2D Volume 36.0 mL    TR Peak Joao 2.3 m/s    RVID d 3.6 cm    A4C EF 59 %    Tricuspid valve peak regurgitation velocity 2.32 m/s    Left ventricular stroke volume (2D) 54.00 mL    IVSd 1.30 cm    Tricuspid annular plane systolic excursion 2.30 cm    Ao root 4.10 cm    LVPWd 1.40 cm    LA size 3.6 cm    LA volume (BP) 80 mL    FS 28 28 - 44    LVIDS 3.30 cm    IVS 1.3 cm    LVIDd 4.60 cm    LA length (A2C) 6.30 cm    LEFT VENTRICLE SYSTOLIC VOLUME (MOD BIPLANE) 2D 43 mL    LV DIASTOLIC VOLUME (MOD BIPLANE) 2D 97 mL    Left Atrium Area-systolic Four Chamber 21.2 cm2    Left Atrium Area-systolic Apical Two Chamber 25.9 cm2    MV E' Tissue Velocity Septal 11 cm/s    LVSV, 2D 54 mL    LV EF 65     Est. RA pres 5.0 mmHg    Right Ventricular Peak Systolic Pressure 27.00 mmHg   Type and screen    Collection Time: 01/08/24  3:01 PM   Result Value Ref Range    ABO Grouping A      Rh Factor Positive     Antibody Screen Negative     Specimen Expiration Date 20240111    CEA    Collection Time: 01/08/24  3:01 PM   Result Value Ref Range    CEA 1.5 0.0 - 3.0 ng/mL   [facility] - Contact Blood Bank Prior to Collection    Collection Time: 01/08/24  6:00 PM   Result Value Ref Range    ABO Grouping A     Rh Factor Positive        Current Medications:  Scheduled Meds:  Current Facility-Administered Medications   Medication Dose Route Frequency Provider Last Rate    [Transfer Hold] acetaminophen  650 mg Oral Q6H PRN WES Peng      [Transfer Hold] enoxaparin  40 mg Subcutaneous Daily WES Peng      [Transfer Hold] hydrALAZINE  5 mg Intravenous Q6H PRN WES Peng      [Transfer Hold] HYDROmorphone  0.5 mg Intravenous Q2H PRN Jolie Heller MD      multi-electrolyte  75 mL/hr Intravenous Continuous WES Peng 75 mL/hr (01/08/24 1748)    [Transfer Hold] naloxone  0.04 mg Intravenous Q1MIN PRN WES Peng      [Transfer Hold] nicotine  1 patch Transdermal Daily WES Peng      [Transfer Hold] ondansetron  4 mg Intravenous Q6H PRN WES Peng      [Transfer Hold] oxyCODONE  2.5 mg Oral Q4H PRN WES Peng      Or    [Transfer Hold] oxyCODONE  5 mg Oral Q4H PRN WES Peng       Continuous Infusions:multi-electrolyte, 75 mL/hr, Last Rate: 75 mL/hr (01/08/24 1748)      PRN Meds:    [Transfer Hold] acetaminophen    [Transfer Hold] hydrALAZINE    [Transfer Hold] HYDROmorphone    [Transfer Hold] naloxone    [Transfer Hold] ondansetron    [Transfer Hold] oxyCODONE **OR** [Transfer Hold] oxyCODONE      Surekha Michelle PA-C  1/9/2024

## 2024-01-09 NOTE — PROGRESS NOTES
"Progress Note - General Surgery   Davis Goss 53 y.o. male MRN: [id number]  Unit/Bed#: OR POOL Encounter: [id number]    Assessment:  Large bowel obstruction because of a mass in the sigmoid colon.    Plan:  Exploratory laparotomy with sigmoid colectomy with stoma formation.    Subjective/Objective   Chief Complaint: Abdominal distention and abdominal pain    Subjective: Abdominal distention and pain    Objective: Same as above    Blood pressure (!) 172/102, pulse 92, temperature 98.1 °F (36.7 °C), temperature source Temporal, resp. rate 18, height 5' 11\" (1.803 m), weight 71.1 kg (156 lb 12.8 oz), SpO2 98%.,Body mass index is 21.87 kg/m².      Intake/Output Summary (Last 24 hours) at 1/9/2024 0748  Last data filed at 1/9/2024 0725  Gross per 24 hour   Intake 1230 ml   Output 1550 ml   Net -320 ml       Invasive Devices       Peripheral Intravenous Line  Duration             Peripheral IV 01/07/24 Left;Ventral (anterior) Forearm 1 day              Drain  Duration             NG/OG/Enteral Tube Nasogastric 12 Fr Right nare <1 day                    Physical Exam: Patient is alert awake oriented.  Vital signs stable.  Abdomen is soft but distended.  NG tube is in position.  No tenderness.  No rebound.  No guarding.    Lab, Imaging and other studies:I have personally reviewed pertinent lab results.  , CBC: No results found for: \"WBC\", \"HGB\", \"HCT\", \"MCV\", \"PLT\", \"ADJUSTEDWBC\", \"RBC\", \"MCH\", \"MCHC\", \"RDW\", \"MPV\", \"NRBC\", CMP:   Lab Results   Component Value Date    SODIUM 140 01/09/2024    K 3.9 01/09/2024     01/09/2024    CO2 22 01/09/2024    BUN 21 01/09/2024    CREATININE 1.01 01/09/2024    CALCIUM 8.6 01/09/2024    EGFR 85 01/09/2024   , Coagulation: No results found for: \"PT\", \"INR\", \"APTT\", Urinalysis: No results found for: \"COLORU\", \"CLARITYU\", \"SPECGRAV\", \"PHUR\", \"LEUKOCYTESUR\", \"NITRITE\", \"PROTEINUA\", \"GLUCOSEU\", \"KETONESU\", \"BILIRUBINUR\", \"BLOODU\", Amylase: No results found for: \"AMYLASE\", Lipase: No " "results found for: \"LIPASE\"  VTE Pharmacologic Prophylaxis: Sequential compression device (Venodyne)  and Heparin  VTE Mechanical Prophylaxis: sequential compression device  "

## 2024-01-09 NOTE — RESPIRATORY THERAPY NOTE
"RT Protocol Note  Davis Goss 53 y.o. male MRN: [id number]  Unit/Bed#: -01 Encounter: [id number]    Assessment    Principal Problem:    Colonic mass  Active Problems:    Smoking    Substance use    Elevated blood pressure reading    Colonic obstruction (HCC)      Home Pulmonary Medications:  None       History reviewed. No pertinent past medical history.  Social History     Socioeconomic History    Marital status: Single     Spouse name: None    Number of children: None    Years of education: None    Highest education level: None   Occupational History    None   Tobacco Use    Smoking status: Every Day     Current packs/day: 0.50     Types: Cigarettes    Smokeless tobacco: Never   Vaping Use    Vaping status: Never Used   Substance and Sexual Activity    Alcohol use: Yes     Comment: daily    Drug use: Yes     Types: Cocaine     Comment: last used: 12/25/23    Sexual activity: None   Other Topics Concern    None   Social History Narrative    None     Social Determinants of Health     Financial Resource Strain: Not on file   Food Insecurity: Not on file   Transportation Needs: Not on file   Physical Activity: Not on file   Stress: Not on file   Social Connections: Not on file   Intimate Partner Violence: Not on file   Housing Stability: Not on file       Subjective         Objective    Physical Exam:   Assessment Type: Assess only  General Appearance: Awake, Alert  Respiratory Pattern: Normal  Chest Assessment: Chest expansion symmetrical  Bilateral Breath Sounds: Clear    Vitals:  Blood pressure (!) (P) 158/102, pulse (P) 97, temperature 97.8 °F (36.6 °C), temperature source Oral, resp. rate (P) 12, height 5' 11\" (1.803 m), weight 71.1 kg (156 lb 12.8 oz), SpO2 (P) 99%.          Imaging and other studies: I have personally reviewed pertinent reports.            Plan    Respiratory Plan: No distress/Pulmonary history        Resp Comments: Assessed pt per protocol. Pt has no pulmonary history. Breath sounds " clear. Post-Op pt. Pt has NGT in place. Capnography ordered. Placed pt on Capnography at this time. Respiratory to follow.

## 2024-01-09 NOTE — UTILIZATION REVIEW
NOTIFICATION OF INPATIENT ADMISSION   AUTHORIZATION REQUEST   SERVICING FACILITY:   [place] [address]  Tax ID: [id number]  NPI: [id number] ATTENDING PROVIDER:  Attending Name and NPI#: Annmarie Redding Md [[id number]]  Address: [address] [place] [address]  Phone:  [phone]     ADMISSION INFORMATION:  Place of Service: Inpatient [facility]  Place of Service Code: 21  Inpatient Admission Date/Time: 1/7/24 11:25 AM  Discharge Date/Time: No discharge date for patient encounter.  Admitting Diagnosis Code/Description:  Colonic obstruction (HCC) [K56.609]  Constipation [K59.00]  Colonic mass [K63.89]     UTILIZATION REVIEW CONTACT:  Petty Ba, Utilization   Network Utilization Review Department  Phone: [phone]  Fax: [phone]  Email: [email or web address]  Contact for approvals/pending authorizations, clinical reviews, and discharge.     PHYSICIAN ADVISORY SERVICES:  Medical Necessity Denial & Xcsg-ci-Lyiv Review  Phone: [phone]  Fax: [phone]  Email: [email or web address]     DISCHARGE SUPPORT TEAM:  For Patients Discharge Needs & Updates  Phone: [phone] opt. 2 Fax: [phone]  Email: [email or web address]

## 2024-01-09 NOTE — OP NOTE
OPERATIVE REPORT  PATIENT NAME: Davis Goss    :  1970  MRN: [id number]  Pt Location: [facility] OR ROOM 03    SURGERY DATE: 2024    Surgeons and Role:     * Robles Cheek MD - Primary     * David Weinstein MD - Assisting     * Surekha Michelle PA-C - Assisting    Preop Diagnosis:  Colonic obstruction (HCC) [K56.609]    Post-Op Diagnosis Codes:     * Colonic obstruction (HCC) [K56.609]    Procedure(s):  EXPLORATORY LAPAROTOMY. SIGMOID COLECTOMY AND COLOSTOMY REPAIR OF BLADDER PERFORATION  HARTMANS PROCEDURE    Specimen(s):  ID Type Source Tests Collected by Time Destination   1 : Sigmoid Colon Tissue Large Intestine, Sigmoid Colon TISSUE EXAM Robles Cheek MD 2024 1118    2 : Sigmoid Proximal Margin Tissue Large Intestine, Sigmoid Colon TISSUE EXAM Robles hCeek MD 2024 1119        Estimated Blood Loss:   Minimal    Drains:  Closed/Suction Drain Right Abdomen Bulb 15 Fr. (Active)   Number of days: 0       Closed/Suction Drain Lateral;Right Abdomen Bulb 15 Fr. (Active)   Number of days: 0       NG/OG/Enteral Tube Nasogastric 12 Fr Right nare (Active)   Site Assessment Clean;Dry;Intact 24 0732   Status Clamped 24 0732   Drainage Appearance Bile 24 0626   Output (mL) 700 mL 24 0626   Number of days: 1       Urethral Catheter Latex 16 Fr. (Active)   Number of days: 0       Anesthesia Type:   General    Operative Indications:  Colonic obstruction (HCC) [K56.609]      Operative Findings:  Obstructing mass in the distal sigmoid colon.  The mass was densely adhered to the pelvic sidewall on the left side.  It was also densely adhered with loss of all planes to the urinary bladder.  Proximal descending transverse and ascending colon were distended.  Ileum and proximal jejunum was also distended massively.  Multiple enlarged lymph nodes in the colonic mesentery.  The left ureter was not visualized.    Complications:   None    Procedure and Technique:  The patient was brought to the  operating room and identified correctly.  General anesthesia given by anesthesia team.  Hannon catheter inserted.  Parts were prepped and draped in the standard fashion.  Timeout was performed.  Patient received preoperative IV antibiotics.  Patient already had an NG tube.  A lower midline incision was made starting from just above the umbilicus up to the symphysis pubis.  The abdomen was entered.  Evisceration of the small bowel was performed.  A Elgin retractor was placed to retract the small bowel in the right upper quadrant.  We identified the white line of Toldt and opened the peritoneal reflection.  Dissection of the colon was done superiorly up to the splenic flexure.  We entered the retroperitoneum and reflected the sigmoid colon medially.  As we started doing the dissection towards the pelvis we realized that the colon was densely adhered and was tortuously stuck to the pelvic sidewall.  Painstakingly with the help of the Metzenbaum scissors some of those adhesions were taken down.  Hemostasis was achieved using LigaSure.  During this process we realized that the colon mass was densely adherent to the bladder.  While taking of the colon from the fundus of the bladder there was a 2 cm bladder perforation.  This was recognized and immediately repaired using 2-0 Vicryl in 2 layers.  We then continued to separate the colon from the bladder using Metzenbaum scissors.  We made a decision then to take down the proximal colonic mesentery using LigaSure and resect the sigmoid colon.  The point of resection was at least 10 cm from the palpable colon mass proximally.  The resection was done using single fire of the BONNIE 75 mm stapler.  Posterior dissection was done using LigaSure.  At this point we were not able to identify the ureters on the left side.  The peritoneum on the right of the colon was opened after scoring scoring it with electrocautery.  At this point we were able to recognize the area distal to the  obstructing mass.  It looked normal.  A window was created in the mesentery of the upper rectum.  Transection point was chosen in the upper rectum and a BONNIE-75 was used to resect distally.  This was at least 3 to 4 cm from the obstructing mass.  After this using finger fracture technique I was able to free the mass from the surrounding structure.  Specimen was then delivered to the back table.  At this point all the hemostasis was found to be adequate.  We then filled up the bladder with 250 cc of saline with methylene blue.  No leaks were seen.  Further mobilization of the proximal colon was performed by taking down the peritoneal reflection up to the line of Toldt.  The staple line was slightly dusky so another fire of the BONNIE stapler was done to resect a centimeter to the point where the perfusion of the colon looked fine.  The staple line of the stump was marked with a 2-0 Prolene. 2 Such sutures were taken.  Seprafilm was placed in the pelvis to avoid adhesions.  A drain was placed in the space of Retzius.  It was fixed to the skin using 3-0 nylon.  Another drain was placed in the pelvis that was also sutured with 3-0 nylon.  The pelvic drain was labeled P and the bladder drain was labeled B.  A site was selected in the left lower quadrant for the stoma.  #10 blade was used to cut a disc of skin.  Electrocautery was then used to dissected to the fascia and made a cruciate incision in the fascia.  The muscles were retracted.  An incision was made in the peritoneum.  The proximal end of the colon was brought out from there.  Closure of the abdomen was then done in a continuous fashion using looped #1 PDS.  The stoma was then matured by taking off the staple line with electrocautery.  The maturation of the stoma was done using 3-0 Vicryl suture.  We ensured that there was no twisting.  3-0 Vicryl was used to approximate subcutaneous tissue around the umbilicus.  The closure of the skin was done by skin staples.   Dressing was done using Mepilex.  Stoma appliance was placed.  Drain dressing was done.  The patient was then reversed from anesthesia and taken to recovery under stable condition.   I was present for the entire procedure., A qualified resident physician was not available., and A physician assistant was required during the procedure for retraction, tissue handling, dissection and suturing.    Patient Disposition:  PACU     Colon Resection  Operation performed with curative intent No   Tumor Location (select all that apply) Sigmoid colon   Extent of colon and vascular resection (select all that apply) Sigmoid resection - inferior mesenteric          SIGNATURE: Robles Cheek MD  DATE: January 9, 2024  TIME: 12:28 PM

## 2024-01-09 NOTE — PROGRESS NOTES
[facility]  Progress Note  Name: Davis Goss I  MRN: [id number]  Unit/Bed#: MS 333[facility]to I Date of Admission: 1/7/2024   Date of Service: 1/9/2024 I Hospital Day: 2    Assessment/Plan   * Colonic mass  Assessment & Plan  Patient presented to the ED on 1/6 for complaints of ongoing constipation accompanied by weight loss of there past year. Patient stating at the end of December he had loss of appetite due to nausea and vomiting following each meal. Workup identified colonic mass. Pt left AMA but returned on 1/7 for further evaluation.   CT A/P: Partial colonic obstruction 2/2 7 cm long apple core lesion of mid sigmoid colon suspicious for colonic malignancy.  GI following, initially plan for flex sig but surgery to obtain tissue samples  Will need CEA, CT chest and oncology follow up pending course  S/p soap sudds enema x3, NPO with NGT & IVFs  General surgery following, exploratory laparotomy, Jo's procedure & left colostomy today   With abnormal EKG on admission, however Echo was normal  Pain management, antiemetics    Elevated blood pressure reading  Assessment & Plan  Pt with notable HTN on admission, Initially believed secondary to abdominal pain with nausea and vomiting but BP now persistently elevated.   No prior history of HTN  Monitor While admitted  Continue pain management    Substance use  Assessment & Plan  Pt stated he drinks liquor and beer multiple times a week. Unable to specify quantity.  Pt also noted occasional marijuana and coke use   UDS: (+) Opiates and Cocaine  It appears pt did receive Oral Oxy and IV dilaudid prior to drug screen  Continue CIWA protocol  Continue supportive care    Smoking  Assessment & Plan  PT smokes 1/2 PPD  Per pt, he does not need NRT because he only smokes because its there  NRT while admitted  Educated on smoking cessation               VTE Pharmacologic Prophylaxis: VTE Score: 3 Moderate Risk (Score 3-4) - Pharmacological DVT Prophylaxis  Ordered: enoxaparin (Lovenox).    Mobility:   Basic Mobility Inpatient Raw Score: 24  JH-HLM Goal: 8: Walk 250 feet or more  JH-HLM Achieved: 8: Walk 250 feet ot more  HLM Goal achieved. Continue to encourage appropriate mobility.    Patient Centered Rounds: I performed bedside rounds with nursing staff today.   Discussions with Specialists or Other Care Team Provider: GI, general surgery, case management    Education and Discussions with Family / Patient: Updated  (friend) at bedside.    Total Time Spent on Date of Encounter in care of patient: 35 mins. This time was spent on one or more of the following: performing physical exam; counseling and coordination of care; obtaining or reviewing history; documenting in the medical record; reviewing/ordering tests, medications or procedures; communicating with other healthcare professionals and discussing with patient's family/caregivers.    Current Length of Stay: 2 day(s)  Current Patient Status: Inpatient   Certification Statement: The patient will continue to require additional inpatient hospital stay due to NG tube, IV fluids, clinical improvement postop  Discharge Plan: Anticipate discharge in >72 hrs to discharge location to be determined pending rehab evaluations.    Code Status: Level 1 - Full Code    Subjective:   Patient is seen at bedside this afternoon after OR today, reports abdominal pain with coughing but otherwise comfortable with no acute complaints.    Objective:     Vitals:   Temp (24hrs), Av.5 °F (36.9 °C), Min:97.5 °F (36.4 °C), Max:99.5 °F (37.5 °C)    Temp:  [97.5 °F (36.4 °C)-99.5 °F (37.5 °C)] 98 °F (36.7 °C)  HR:  [] 96  Resp:  [15-20] 16  BP: (124-172)/() 142/94  SpO2:  [95 %-100 %] 95 %  Body mass index is 21.87 kg/m².     Input and Output Summary (last 24 hours):     Intake/Output Summary (Last 24 hours) at 2024 1414  Last data filed at 2024 1315  Gross per 24 hour   Intake 5380 ml   Output 2155 ml   Net  3225 ml       Physical Exam:   Physical Exam  Constitutional:       General: He is not in acute distress.     Appearance: He is not ill-appearing, toxic-appearing or diaphoretic.   HENT:      Nose:      Comments: NG tube in place  Cardiovascular:      Rate and Rhythm: Normal rate and regular rhythm.      Pulses: Normal pulses.      Heart sounds: Normal heart sounds.   Pulmonary:      Effort: Pulmonary effort is normal. No respiratory distress.      Breath sounds: Normal breath sounds.   Abdominal:      General: Bowel sounds are normal. There is no distension.      Palpations: Abdomen is soft.      Tenderness: There is no abdominal tenderness.      Comments: Colostomy in place with moderate amount of loose, brown stool.  JOE drain in place with dressings intact.   Musculoskeletal:         General: No swelling or tenderness.   Skin:     General: Skin is warm.   Neurological:      General: No focal deficit present.      Mental Status: He is alert.   Psychiatric:         Mood and Affect: Mood normal.         Behavior: Behavior normal.          Additional Data:     Labs:  Results from last 7 days   Lab Units 01/08/24  0449   WBC Thousand/uL 7.01   HEMOGLOBIN g/dL 13.2   HEMATOCRIT % 39.3   PLATELETS Thousands/uL 353   NEUTROS PCT % 63   LYMPHS PCT % 20   MONOS PCT % 16*   EOS PCT % 1     Results from last 7 days   Lab Units 01/09/24  0613 01/08/24  0449   SODIUM mmol/L 140 139   POTASSIUM mmol/L 3.9 4.2   CHLORIDE mmol/L 101 105   CO2 mmol/L 22 27   BUN mg/dL 21 20   CREATININE mg/dL 1.01 1.01   ANION GAP mmol/L 17 7   CALCIUM mg/dL 8.6 8.5   ALBUMIN g/dL  --  3.5   TOTAL BILIRUBIN mg/dL  --  0.73   ALK PHOS U/L  --  51   ALT U/L  --  11   AST U/L  --  11*   GLUCOSE RANDOM mg/dL 59* 83     Results from last 7 days   Lab Units 01/08/24  0449   INR  1.35*     Results from last 7 days   Lab Units 01/09/24  1214   POC GLUCOSE mg/dl 134     Results from last 7 days   Lab Units 01/08/24  0449   HEMOGLOBIN A1C % 5.5     Results  from last 7 days   Lab Units 01/06/24  1206   PROCALCITONIN ng/ml 0.08       Lines/Drains:  Invasive Devices       Peripheral Intravenous Line  Duration             Peripheral IV 01/07/24 Left;Ventral (anterior) Forearm 2 days    Peripheral IV 01/09/24 Right Forearm <1 day              Drain  Duration             Closed/Suction Drain Lateral;Right Abdomen Bulb 15 Fr. <1 day    Closed/Suction Drain Right Abdomen Bulb 15 Fr. <1 day    NG/OG/Enteral Tube Nasogastric 12 Fr Right nare <1 day    Urethral Catheter Latex 16 Fr. <1 day                  Urinary Catheter:  Goal for removal: N/A- Discharging with [place]               Imaging: Reviewed radiology reports from this admission including: abdominal/pelvic CT    Recent Cultures (last 7 days):         Last 24 Hours Medication List:   Current Facility-Administered Medications   Medication Dose Route Frequency Provider Last Rate    acetaminophen  1,000 mg Intravenous Q6H [facility] SIMI Mishra-DELMAR      cefazolin  1,000 mg Intravenous Q8H Surekha Michelle PA-C      diphenhydrAMINE  25 mg Intravenous Q6H PRN SIMI Mishra-C      enoxaparin  40 mg Subcutaneous Daily SIMI Mishra-DELMAR      hydrALAZINE  5 mg Intravenous Q6H PRN SIMI Mishra-C      HYDROmorphone   Intravenous Continuous SIMI Mishra-DELMAR      ketorolac  15 mg Intravenous Q6H PRN SIMI Mishra-C      lactated ringers  1,000 mL Intravenous Once PRN SIMI Mishra-C      And    lactated ringers  1,000 mL Intravenous Once PRN SIMI Mishra-C      metoclopramide  5 mg Intravenous Q6H PRN SIMI Mishra-C      metroNIDAZOLE  500 mg Intravenous Q8H SIMI Mishra-C      multi-electrolyte  75 mL/hr Intravenous Continuous SIMI Mishra-C 75 mL/hr (01/08/24 9938)    naloxone  0.04 mg Intravenous Q1MIN PRN SIMI Mishra-DELMAR      naloxone  0.04 mg Intravenous Q3 min PRN SIMI Mishra-C      nicotine  1 patch Transdermal Daily Surekha Michelle PA-C      ondansetron  4 mg Intravenous Q6H PRN Surekha Michelle  YONY      sodium chloride  1,000 mL Intravenous Once PRN Surekha Michelle PA-C      And    sodium chloride  1,000 mL Intravenous Once PRN Surekha Michelle PA-C          Today, Patient Was Seen By: Marcia Li PA-C    **Please Note: This note may have been constructed using a voice recognition system.**

## 2024-01-09 NOTE — ASSESSMENT & PLAN NOTE
Pt stated he drinks liquor and beer multiple times a week. Unable to specify quantity.  Pt also noted occasional marijuana and coke use   UDS: (+) Opiates and Cocaine  It appears pt did receive Oral Oxy and IV dilaudid prior to drug screen  Continue CIWA protocol  Continue supportive care   fall precautions/left hip precautions

## 2024-01-09 NOTE — ANESTHESIA POSTPROCEDURE EVALUATION
Post-Op Assessment Note    CV Status:  Stable  Pain Score: 0    Pain management: adequate    Multimodal analgesia used between 6 hours prior to anesthesia start to PACU discharge    Mental Status:  Alert and awake   Hydration Status:  Stable   PONV Controlled:  None   Airway Patency:  Patent  Airway: intubated  Two or more mitigation strategies used for obstructive sleep apnea   Post Op Vitals Reviewed: Yes      Staff: Anesthesiologist, CRNA               BP   130/83   Temp   99.5   Pulse  101   Resp   18   SpO2   98

## 2024-01-09 NOTE — PLAN OF CARE
Problem: PAIN - ADULT  Goal: Verbalizes/displays adequate comfort level or baseline comfort level  Description: Interventions:  - Encourage patient to monitor pain and request assistance  - Assess pain using appropriate pain scale  - Administer analgesics based on type and severity of pain and evaluate response  - Implement non-pharmacological measures as appropriate and evaluate response  - Consider cultural and social influences on pain and pain management  - Notify physician/advanced practitioner if interventions unsuccessful or patient reports new pain  Outcome: Progressing     Problem: GASTROINTESTINAL - ADULT  Goal: Minimal or absence of nausea and/or vomiting  Description: INTERVENTIONS:  - Administer IV fluids if ordered to ensure adequate hydration  - Maintain NPO status until nausea and vomiting are resolved  - Nasogastric tube if ordered  - Administer ordered antiemetic medications as needed  - Provide nonpharmacologic comfort measures as appropriate  - Advance diet as tolerated, if ordered  - Consider nutrition services referral to assist patient with adequate nutrition and appropriate food choices  Outcome: Progressing

## 2024-01-09 NOTE — ASSESSMENT & PLAN NOTE
Patient presented to the ED on 1/6 for complaints of ongoing constipation accompanied by weight loss of there past year. Patient stating at the end of December he had loss of appetite due to nausea and vomiting following each meal. Workup identified colonic mass. Pt left AMA but returned on 1/7 for further evaluation.   CT A/P: Partial colonic obstruction 2/2 7 cm long apple core lesion of mid sigmoid colon suspicious for colonic malignancy.  GI following, initially plan for flex sig but surgery to obtain tissue samples  Will need CEA, CT chest and oncology follow up pending course  S/p soap sudds enema x3, NPO with NGT & IVFs  General surgery following, exploratory laparotomy, Jo's procedure & left colostomy today   With abnormal EKG on admission, however Echo was normal  Pain management, antiemetics

## 2024-01-10 ENCOUNTER — TELEPHONE (OUTPATIENT)
Dept: OTHER | Facility: HOSPITAL | Age: 54
End: 2024-01-10

## 2024-01-10 LAB
ANION GAP SERPL CALCULATED.3IONS-SCNC: 8 MMOL/L
BASOPHILS # BLD AUTO: 0.02 THOUSANDS/ÂΜL (ref 0–0.1)
BASOPHILS NFR BLD AUTO: 0 % (ref 0–1)
BUN SERPL-MCNC: 19 MG/DL (ref 5–25)
CALCIUM SERPL-MCNC: 8.2 MG/DL (ref 8.4–10.2)
CHLORIDE SERPL-SCNC: 102 MMOL/L (ref 96–108)
CO2 SERPL-SCNC: 29 MMOL/L (ref 21–32)
CREAT SERPL-MCNC: 1.07 MG/DL (ref 0.6–1.3)
EOSINOPHIL # BLD AUTO: 0.02 THOUSAND/ÂΜL (ref 0–0.61)
EOSINOPHIL NFR BLD AUTO: 0 % (ref 0–6)
ERYTHROCYTE [DISTWIDTH] IN BLOOD BY AUTOMATED COUNT: 15 % (ref 11.6–15.1)
GFR SERPL CREATININE-BSD FRML MDRD: 79 ML/MIN/1.73SQ M
GLUCOSE SERPL-MCNC: 108 MG/DL (ref 65–140)
HCT VFR BLD AUTO: 35.9 % (ref 36.5–49.3)
HGB BLD-MCNC: 12.2 G/DL (ref 12–17)
IMM GRANULOCYTES # BLD AUTO: 0.05 THOUSAND/UL (ref 0–0.2)
IMM GRANULOCYTES NFR BLD AUTO: 0 % (ref 0–2)
LYMPHOCYTES # BLD AUTO: 0.95 THOUSANDS/ÂΜL (ref 0.6–4.47)
LYMPHOCYTES NFR BLD AUTO: 9 % (ref 14–44)
MAGNESIUM SERPL-MCNC: 1.6 MG/DL (ref 1.9–2.7)
MCH RBC QN AUTO: 29 PG (ref 26.8–34.3)
MCHC RBC AUTO-ENTMCNC: 34 G/DL (ref 31.4–37.4)
MCV RBC AUTO: 85 FL (ref 82–98)
MONOCYTES # BLD AUTO: 0.45 THOUSAND/ÂΜL (ref 0.17–1.22)
MONOCYTES NFR BLD AUTO: 4 % (ref 4–12)
NEUTROPHILS # BLD AUTO: 9.65 THOUSANDS/ÂΜL (ref 1.85–7.62)
NEUTS SEG NFR BLD AUTO: 87 % (ref 43–75)
NRBC BLD AUTO-RTO: 0 /100 WBCS
PHOSPHATE SERPL-MCNC: 4.4 MG/DL (ref 2.7–4.5)
PLATELET # BLD AUTO: 290 THOUSANDS/UL (ref 149–390)
PMV BLD AUTO: 10.5 FL (ref 8.9–12.7)
POTASSIUM SERPL-SCNC: 4.3 MMOL/L (ref 3.5–5.3)
RBC # BLD AUTO: 4.21 MILLION/UL (ref 3.88–5.62)
SODIUM SERPL-SCNC: 139 MMOL/L (ref 135–147)
WBC # BLD AUTO: 11.14 THOUSAND/UL (ref 4.31–10.16)

## 2024-01-10 PROCEDURE — 97167 OT EVAL HIGH COMPLEX 60 MIN: CPT

## 2024-01-10 PROCEDURE — 84100 ASSAY OF PHOSPHORUS: CPT | Performed by: PHYSICIAN ASSISTANT

## 2024-01-10 PROCEDURE — 97530 THERAPEUTIC ACTIVITIES: CPT

## 2024-01-10 PROCEDURE — 99232 SBSQ HOSP IP/OBS MODERATE 35: CPT | Performed by: INTERNAL MEDICINE

## 2024-01-10 PROCEDURE — 97163 PT EVAL HIGH COMPLEX 45 MIN: CPT

## 2024-01-10 PROCEDURE — 85025 COMPLETE CBC W/AUTO DIFF WBC: CPT | Performed by: PHYSICIAN ASSISTANT

## 2024-01-10 PROCEDURE — 80048 BASIC METABOLIC PNL TOTAL CA: CPT | Performed by: PHYSICIAN ASSISTANT

## 2024-01-10 PROCEDURE — 83735 ASSAY OF MAGNESIUM: CPT | Performed by: PHYSICIAN ASSISTANT

## 2024-01-10 PROCEDURE — 94760 N-INVAS EAR/PLS OXIMETRY 1: CPT

## 2024-01-10 PROCEDURE — 99024 POSTOP FOLLOW-UP VISIT: CPT | Performed by: SURGERY

## 2024-01-10 RX ORDER — HYDRALAZINE HYDROCHLORIDE 20 MG/ML
10 INJECTION INTRAMUSCULAR; INTRAVENOUS EVERY 6 HOURS PRN
Status: DISCONTINUED | OUTPATIENT
Start: 2024-01-10 | End: 2024-01-17

## 2024-01-10 RX ORDER — MAGNESIUM SULFATE HEPTAHYDRATE 40 MG/ML
4 INJECTION, SOLUTION INTRAVENOUS ONCE
Status: COMPLETED | OUTPATIENT
Start: 2024-01-10 | End: 2024-01-10

## 2024-01-10 RX ADMIN — ACETAMINOPHEN 1000 MG: 10 INJECTION INTRAVENOUS at 21:41

## 2024-01-10 RX ADMIN — CEFAZOLIN SODIUM 1000 MG: 1 SOLUTION INTRAVENOUS at 08:41

## 2024-01-10 RX ADMIN — METRONIDAZOLE 500 MG: 500 INJECTION, SOLUTION INTRAVENOUS at 09:38

## 2024-01-10 RX ADMIN — MAGNESIUM SULFATE HEPTAHYDRATE 4 G: 40 INJECTION, SOLUTION INTRAVENOUS at 11:49

## 2024-01-10 RX ADMIN — SODIUM CHLORIDE, SODIUM LACTATE, POTASSIUM CHLORIDE, AND CALCIUM CHLORIDE 100 ML/HR: .6; .31; .03; .02 INJECTION, SOLUTION INTRAVENOUS at 00:15

## 2024-01-10 RX ADMIN — ENOXAPARIN SODIUM 40 MG: 40 INJECTION SUBCUTANEOUS at 09:38

## 2024-01-10 RX ADMIN — SODIUM CHLORIDE, SODIUM LACTATE, POTASSIUM CHLORIDE, AND CALCIUM CHLORIDE 100 ML/HR: .6; .31; .03; .02 INJECTION, SOLUTION INTRAVENOUS at 11:00

## 2024-01-10 RX ADMIN — METRONIDAZOLE 500 MG: 500 INJECTION, SOLUTION INTRAVENOUS at 19:32

## 2024-01-10 RX ADMIN — ACETAMINOPHEN 1000 MG: 10 INJECTION INTRAVENOUS at 00:15

## 2024-01-10 RX ADMIN — ACETAMINOPHEN 1000 MG: 10 INJECTION INTRAVENOUS at 12:16

## 2024-01-10 RX ADMIN — CEFAZOLIN SODIUM 1000 MG: 1 SOLUTION INTRAVENOUS at 18:17

## 2024-01-10 NOTE — CASE MANAGEMENT
Case Management Discharge Planning Note    Patient name Davis Goss  Location /-01 MRN [id number]  : 1970 Date 1/10/2024       Current Admission Date: 2024  Current Admission Diagnosis:Colonic mass   Patient Active Problem List    Diagnosis Date Noted    Colonic obstruction (HCC) 2024    Elevated blood pressure reading 2024    Colonic mass 2024    Smoking 2024    Substance use 2024      LOS (days): 3  Geometric Mean LOS (GMLOS) (days):   Days to GMLOS:     OBJECTIVE:  Risk of Unplanned Readmission Score: 13.17         Current admission status: Inpatient   Preferred Pharmacy:   [facility]/pharmacy #[id number] - [place] [id number] [address]  Phone: [phone] Fax: [phone]    Primary Care Provider: No primary care provider on file.    Primary Insurance: BLUE CROSS  Secondary Insurance:     DISCHARGE DETAILS:    Discharge planning discussed with:: patient and his mother  Freedom of Choice: Yes  Comments - Freedom of Choice: Cm met with patient and mother at bedside to review role and dc planning. Patient in agreement withe referral to [facility] agencies. Patient and mother confirmed that patient will dc to her home at [address] [place] [address]. Cm confirmed that blanket referrals will be sent to agencies. Cm will need to confirm if patient has a PCP.  CM contacted family/caregiver?: Yes  Were Treatment Team discharge recommendations reviewed with patient/caregiver?: Yes  Did patient/caregiver verbalize understanding of patient care needs?: Yes  Were patient/caregiver advised of the risks associated with not following Treatment Team discharge recommendations?: Yes         Other Referral/Resources/Interventions Provided:  Referral Comments: blanket referrals sent to [facility] agencies in [place]

## 2024-01-10 NOTE — PLAN OF CARE
Problem: PHYSICAL THERAPY ADULT  Goal: Performs mobility at highest level of function for planned discharge setting.  See evaluation for individualized goals.  Description: Treatment/Interventions: Functional transfer training, LE strengthening/ROM, Elevations, Therapeutic exercise, Endurance training, Patient/family training, Equipment eval/education, Bed mobility, Gait training          See flowsheet documentation for full assessment, interventions and recommendations.  Outcome: Progressing  Note: Prognosis: Fair  Problem List: Impaired balance, Decreased strength, Decreased mobility, Decreased safety awareness, Pain  Assessment: Orders for PT eval and treat received. Pt's PMHx: right knee sx. Pt exhibits physical deficits noted in problem list above.  Deficits listed contribute to functional limitations that are significant from the patient PLOF and include: difficulty with bed mobility, tolerance for OOB functional activities, and unsafe/inefficient ambulation    During today's session, activity limited due to multiple tubes/wires and pt's disinterest in formal assessment/evaluation.  Pt often not forthcoming regarding subjective self assessment or home/PLOF.  Pt difficulty to educate as he is easily distractible and perseverates on non-issues.  When attempting to instruct on proper transfer technique to manage effectively and minimize pain, pt denies assistance and no instruction is followed.  Pt eventually able to manage movement with hospital bed features and slowed pace, but exhibits instability in standing and reports dizziness when getting to sitting.  Heavy use of BUE to manage balance and safety.  Pt refusing hands on assistance, so close SBA provided to ensure safety.  Pt reports comfort once sitting in recliner.    The AM-PAC & Barthel Index outcome tools were used to assist in determining pt safety w/ mobility/self care & appropriate d/c recommendations, see above for scores. Patient's clinical  presentation is evolving due to significant acute change in functional independence, recent surgery/procedure, and ongoing medical management needs.     Considering the patient's PLOF, co-morbidities, acute functional limitations, functional outcome measures, and/or goal to progress functional independence; this patient would benefit from skilled Physical Therapy intervention in the acute care setting.  Barriers to Discharge: None     Rehab Resource Intensity Level, PT: III (Minimum Resource Intensity)    See flowsheet documentation for full assessment.

## 2024-01-10 NOTE — OCCUPATIONAL THERAPY NOTE
Occupational Therapy Evaluation     Patient Name: Davis Goss  Today's Date: 1/10/2024  Problem List  Principal Problem:    Colonic mass  Active Problems:    Smoking    Substance use    Elevated blood pressure reading    Colonic obstruction (HCC)    Past Medical History  History reviewed. No pertinent past medical history.  Past Surgical History  Past Surgical History:   Procedure Laterality Date    HARTMANS PROCEDURE N/A 1/9/2024    Procedure: EXPLORATORY LAPAROTOMY, SIGMOID COLECTOMY AND COLOSTOMY REPAIR OF BLADDER PERFORATION  HARTMANS PROCEDURE;  Surgeon: Robles Cheek MD;  Location:  MAIN OR;  Service: General    KNEE SURGERY Right            01/10/24 1312   OT Last Visit   OT Visit Date 01/10/24   Note Type   Note Type Treatment   Pain Assessment   Pain Assessment Tool 0-10   Pain Score No Pain   Restrictions/Precautions   Weight Bearing Precautions Per Order No   Other Precautions Fall Risk;Bed Alarm;Multiple lines;Chair Alarm   ADL   Grooming Comments Pt deferred performing grooming tasks at this time.   Bed Mobility   Sit to Supine 5  Supervision   Additional items Verbal cues   Additional Comments Pt received OOB.   Transfers   Sit to Stand 5  Supervision   Additional items Verbal cues;Armrests;Increased time required   Stand to Sit 5  Supervision   Additional items Verbal cues;Bedrails   Stand pivot 5  Supervision   Additional items Verbal cues  (no AD, Required assist to manage lines.)   Cognition   Arousal/Participation Alert   Attention Within functional limits   Orientation Level Oriented X4   Memory Decreased recall of recent events   Following Commands Follows multistep commands without difficulty   Assessment   Assessment Pt seen for OT tx session with focus on functional balance, functional mobility, ADL status, and transfer safety. Patient agreeable to OT treatment session. Pt received seated OOB to Recliner. Performed transfers with supervision. Pt continues to require assistance with  various lines. Reviewed strategies to maximize independence with mobility.  Patient continues to be functioning below baseline level, occupational performance remains limited secondary to factors listed above, and pt at increased risk for falls and injury.  From OT standpoint, recommendation at time of d/c would be level 3 resources. Patient to benefit from continued Occupational Therapy treatment while in the hospital to address deficits as defined above and maximize level of functional independence with ADLs and functional mobility. Pt left with call bell in reach, tray table in reach, needs met, bed alarm activated, SCDs on.   Plan   Treatment Interventions ADL retraining;Visual perceptual retraining;Patient/family training;Compensatory technique education;Endurance training;Activityengagement   Goal Expiration Date 01/20/24   OT Treatment Day 1   OT Frequency 2-3x/wk   Discharge Recommendation   Rehab Resource Intensity Level, OT III (Minimum Resource Intensity)   Additional Comments  The patient's raw score on the -PAC Daily Activity inpatient short form is 20, standardized score is 42.03, greater than 39.4. Patients at this level are likely to benefit from DC to home. However please refer to therapist recommendation for discharge planning given other factors that may influence destination.   AM-PAC Daily Activity Inpatient   Lower Body Dressing 3   Bathing 3   Toileting 3   Upper Body Dressing 3   Grooming 4   Eating 4   Daily Activity Raw Score 20   Daily Activity Standardized Score (Calc for Raw Score >=11) 42.03   AM-PAC Applied Cognition Inpatient   Following a Speech/Presentation 4   Understanding Ordinary Conversation 4   Taking Medications 4   Remembering Where Things Are Placed or Put Away 4   Remembering List of 4-5 Errands 4   Taking Care of Complicated Tasks 3   Applied Cognition Raw Score 23   Applied Cognition Standardized Score 53.08           Cathy Archibald, OTR/L

## 2024-01-10 NOTE — PLAN OF CARE
Problem: PAIN - ADULT  Goal: Verbalizes/displays adequate comfort level or baseline comfort level  Description: Interventions:  - Encourage patient to monitor pain and request assistance  - Assess pain using appropriate pain scale  - Administer analgesics based on type and severity of pain and evaluate response  - Implement non-pharmacological measures as appropriate and evaluate response  - Consider cultural and social influences on pain and pain management  - Notify physician/advanced practitioner if interventions unsuccessful or patient reports new pain  Outcome: Progressing     Problem: DISCHARGE PLANNING  Goal: Discharge to home or other facility with appropriate resources  Description: INTERVENTIONS:  - Identify barriers to discharge w/patient and caregiver  - Arrange for needed discharge resources and transportation as appropriate  - Identify discharge learning needs (meds, wound care, etc.)  - Arrange for interpretive services to assist at discharge as needed  - Refer to Case Management Department for coordinating discharge planning if the patient needs post-hospital services based on physician/advanced practitioner order or complex needs related to functional status, cognitive ability, or social support system  Outcome: Progressing     Problem: Knowledge Deficit  Goal: Patient/family/caregiver demonstrates understanding of disease process, treatment plan, medications, and discharge instructions  Description: Complete learning assessment and assess knowledge base.  Interventions:  - Provide teaching at level of understanding  - Provide teaching via preferred learning methods  Outcome: Progressing     Problem: GASTROINTESTINAL - ADULT  Goal: Minimal or absence of nausea and/or vomiting  Description: INTERVENTIONS:  - Administer IV fluids if ordered to ensure adequate hydration  - Maintain NPO status until nausea and vomiting are resolved  - Nasogastric tube if ordered  - Administer ordered antiemetic  medications as needed  - Provide nonpharmacologic comfort measures as appropriate  - Advance diet as tolerated, if ordered  - Consider nutrition services referral to assist patient with adequate nutrition and appropriate food choices  Outcome: Progressing  Goal: Maintains or returns to baseline bowel function  Description: INTERVENTIONS:  - Assess bowel function  - Encourage oral fluids to ensure adequate hydration  - Administer IV fluids if ordered to ensure adequate hydration  - Administer ordered medications as needed  - Encourage mobilization and activity  - Consider nutritional services referral to assist patient with adequate nutrition and appropriate food choices  Outcome: Progressing  Goal: Maintains adequate nutritional intake  Description: INTERVENTIONS:  - Monitor percentage of each meal consumed  - Identify factors contributing to decreased intake, treat as appropriate  - Assist with meals as needed  - Monitor I&O, weight, and lab values if indicated  - Obtain nutrition services referral as needed  Outcome: Progressing     Problem: Nutrition/Hydration-ADULT  Goal: Nutrient/Hydration intake appropriate for improving, restoring or maintaining nutritional needs  Description: Monitor and assess patient's nutrition/hydration status for malnutrition. Collaborate with interdisciplinary team and initiate plan and interventions as ordered.  Monitor patient's weight and dietary intake as ordered or per policy. Utilize nutrition screening tool and intervene as necessary. Determine patient's food preferences and provide high-protein, high-caloric foods as appropriate.     INTERVENTIONS:  - Monitor oral intake, urinary output, labs, and treatment plans  - Assess nutrition and hydration status and recommend course of action  - Evaluate amount of meals eaten  - Assist patient with eating if necessary   - Allow adequate time for meals  - Recommend/ encourage appropriate diets, oral nutritional supplements, and  vitamin/mineral supplements  - Order, calculate, and assess calorie counts as needed  - Recommend, monitor, and adjust tube feedings and TPN/PPN based on assessed needs  - Assess need for intravenous fluids  - Provide specific nutrition/hydration education as appropriate  - Include patient/family/caregiver in decisions related to nutrition  Outcome: Progressing

## 2024-01-10 NOTE — PROGRESS NOTES
[facility]  Progress Note  Name: Davis Goss I  MRN: [id number]  Unit/Bed#: MS 333[facility]umberto I Date of Admission: 1/7/2024   Date of Service: 1/10/2024 I Hospital Day: 3    Assessment/Plan   Elevated blood pressure reading  Assessment & Plan  Pt with notable HTN on admission, Initially believed secondary to abdominal pain with nausea and vomiting but BP now persistently elevated.   No prior history of HTN  Monitor While admitted  Continue pain management    Substance use  Assessment & Plan  Pt stated he drinks liquor and beer multiple times a week. Unable to specify quantity.  Pt also noted occasional marijuana and coke use   UDS: (+) Opiates and Cocaine  It appears pt did receive Oral Oxy and IV dilaudid prior to drug screen  Continue CIWA protocol  Continue supportive care    Smoking  Assessment & Plan  PT smokes 1/2 PPD  Per pt, he does not need NRT because he only smokes because its there  NRT while admitted  Educated on smoking cessation    * Colonic mass  Assessment & Plan  Patient presented to the ED on 1/6 for complaints of ongoing constipation accompanied by weight loss of there past year. Patient stating at the end of December he had loss of appetite due to nausea and vomiting following each meal. Workup identified colonic mass. Pt left AMA but returned on 1/7 for further evaluation.   CT A/P: Partial colonic obstruction 2/2 7 cm long apple core lesion of mid sigmoid colon suspicious for colonic malignancy.  GI following, initially plan for flex sig but surgery to obtain tissue samples  Will need CEA, CT chest and oncology follow up pending course  S/p soap sudds enema x3, NPO with NGT & IVFs  General surgery following, patient status post postop day 1 from ex lap sigmoid colectomy with Jo's procedure and repair of bladder perforation  With abnormal EKG on admission, however Echo was normal  Pain management, antiemetics                 VTE Pharmacologic Prophylaxis: VTE Score: 3  Moderate Risk (Score 3-4) - Pharmacological DVT Prophylaxis Ordered: enoxaparin (Lovenox).    Mobility:   Basic Mobility Inpatient Raw Score: 18  JH-HLM Goal: 6: Walk 10 steps or more  JH-HLM Achieved: 1: Laying in bed  HLM Goal achieved. Continue to encourage appropriate mobility.    Patient Centered Rounds: I performed bedside rounds with nursing staff today.   Discussions with Specialists or Other Care Team Provider: n/a    Education and Discussions with Family / Patient: Patient declined call to .     Total Time Spent on Date of Encounter in care of patient: 35 mins. This time was spent on one or more of the following: performing physical exam; counseling and coordination of care; obtaining or reviewing history; documenting in the medical record; reviewing/ordering tests, medications or procedures; communicating with other healthcare professionals and discussing with patient's family/caregivers.    Current Length of Stay: 3 day(s)  Current Patient Status: Inpatient   Certification Statement: The patient will continue to require additional inpatient hospital stay due to surgical clerance  Discharge Plan: Anticipate discharge in >72 hrs to discharge location to be determined pending rehab evaluations.    Code Status: Level 1 - Full Code    Subjective:   Reports pain is currently well-controlled denies any acute complaints    Objective:     Vitals:   Temp (24hrs), Av.4 °F (36.9 °C), Min:97.6 °F (36.4 °C), Max:99.5 °F (37.5 °C)    Temp:  [97.6 °F (36.4 °C)-99.5 °F (37.5 °C)] 98.3 °F (36.8 °C)  HR:  [] 115  Resp:  [12-20] 16  BP: (124-159)/() 147/98  SpO2:  [92 %-100 %] 96 %  Body mass index is 21.7 kg/m².     Input and Output Summary (last 24 hours):     Intake/Output Summary (Last 24 hours) at 1/10/2024 1056  Last data filed at 1/10/2024 0700  Gross per 24 hour   Intake 3197.43 ml   Output 2415 ml   Net 782.43 ml       Physical Exam:   Physical Exam  Vitals and nursing note reviewed.    Constitutional:       General: He is not in acute distress.     Appearance: He is well-developed. He is not toxic-appearing or diaphoretic.   HENT:      Head: Normocephalic and atraumatic.   Eyes:      General: No scleral icterus.     Conjunctiva/sclera: Conjunctivae normal.   Cardiovascular:      Rate and Rhythm: Normal rate and regular rhythm.      Heart sounds: No murmur heard.     No friction rub. No gallop.   Pulmonary:      Effort: Pulmonary effort is normal. No respiratory distress.      Breath sounds: Normal breath sounds. No stridor. No wheezing, rhonchi or rales.   Chest:      Chest wall: No tenderness.   Abdominal:      General: There is no distension.      Palpations: Abdomen is soft. There is no mass.      Tenderness: There is no abdominal tenderness. There is no guarding or rebound.      Hernia: No hernia is present.   Musculoskeletal:         General: No swelling or tenderness.      Cervical back: Neck supple.      Comments: Abdominal wound, colostomy in place with stool   Skin:     General: Skin is warm and dry.      Capillary Refill: Capillary refill takes less than 2 seconds.   Neurological:      Mental Status: He is alert and oriented to person, place, and time.   Psychiatric:         Mood and Affect: Mood normal.          Additional Data:     Labs:  Results from last 7 days   Lab Units 01/10/24  0428   WBC Thousand/uL 11.14*   HEMOGLOBIN g/dL 12.2   HEMATOCRIT % 35.9*   PLATELETS Thousands/uL 290   NEUTROS PCT % 87*   LYMPHS PCT % 9*   MONOS PCT % 4   EOS PCT % 0     Results from last 7 days   Lab Units 01/10/24  0428 01/09/24  0613 01/08/24  0449   SODIUM mmol/L 139   < > 139   POTASSIUM mmol/L 4.3   < > 4.2   CHLORIDE mmol/L 102   < > 105   CO2 mmol/L 29   < > 27   BUN mg/dL 19   < > 20   CREATININE mg/dL 1.07   < > 1.01   ANION GAP mmol/L 8   < > 7   CALCIUM mg/dL 8.2*   < > 8.5   ALBUMIN g/dL  --   --  3.5   TOTAL BILIRUBIN mg/dL  --   --  0.73   ALK PHOS U/L  --   --  51   ALT U/L  --   --   11   AST U/L  --   --  11*   GLUCOSE RANDOM mg/dL 108   < > 83    < > = values in this interval not displayed.     Results from last 7 days   Lab Units 01/08/24  0449   INR  1.35*     Results from last 7 days   Lab Units 01/09/24  1214   POC GLUCOSE mg/dl 134     Results from last 7 days   Lab Units 01/08/24  0449   HEMOGLOBIN A1C % 5.5     Results from last 7 days   Lab Units 01/06/24  1206   PROCALCITONIN ng/ml 0.08       Lines/Drains:  Invasive Devices       Peripheral Intravenous Line  Duration             Peripheral IV 01/07/24 Left;Ventral (anterior) Forearm 2 days    Peripheral IV 01/09/24 Left;Upper;Ventral (anterior) Arm <1 day              Drain  Duration             Closed/Suction Drain Right Abdomen Bulb 15 Fr. 1 day    Colostomy LLQ 1 day    NG/OG/Enteral Tube Nasogastric 12 Fr Right nare 1 day    Urethral Catheter Latex 16 Fr. 1 day    Closed/Suction Drain Lateral;Right Abdomen Bulb 15 Fr. <1 day                  Urinary Catheter:  Goal for removal: N/A- Discharging with Hannon               Imaging: No pertinent imaging reviewed.    Recent Cultures (last 7 days):         Last 24 Hours Medication List:   Current Facility-Administered Medications   Medication Dose Route Frequency Provider Last Rate    acetaminophen  1,000 mg Intravenous Q6H [facility] Surekha Michelle, PA-C 1,000 mg (01/10/24 0015)    cefazolin  1,000 mg Intravenous Q8H Surekha Michelle, PA-C 1,000 mg (01/10/24 0841)    diphenhydrAMINE  25 mg Intravenous Q6H PRN Surekha Michelle, PA-C      enoxaparin  40 mg Subcutaneous Daily Surekha Michelle, PA-C      hydrALAZINE  10 mg Intravenous Q6H PRN Shaunsaurav Sidhu,       HYDROmorphone   Intravenous Continuous Surekha Michelle, PA-C      ketorolac  15 mg Intravenous Q6H PRN Surekha Viviana, PA-C      lactated ringers  1,000 mL Intravenous Once PRN Surekha Viviana, PA-C      And    lactated ringers  1,000 mL Intravenous Once PRN Surekha Viviana, PA-C      lactated ringers  100 mL/hr Intravenous Continuous Surekha Viviana, PA-C 100  mL/hr (01/10/24 0410)    magnesium sulfate  4 g Intravenous Once Shaun Sidhu DO      metoclopramide  5 mg Intravenous Q6H PRN Surekha Michelle PA-C      metroNIDAZOLE  500 mg Intravenous Q8H SIMI Mishra-C 500 mg (01/10/24 0938)    naloxone  0.04 mg Intravenous Q1MIN PRN Surekha Michelle PA-C      naloxone  0.04 mg Intravenous Q3 min PRN Surekha Michelle PA-C      nicotine  1 patch Transdermal Daily Surekha Michelle PA-C      ondansetron  4 mg Intravenous Q6H PRN Surekha Michelle PA-C      sodium chloride  1,000 mL Intravenous Once PRN Surekha Michelle PA-C      And    sodium chloride  1,000 mL Intravenous Once PRN Surekha Michelle PA-C          Today, Patient Was Seen By: Shaun Sidhu DO    **Please Note: This note may have been constructed using a voice recognition system.**

## 2024-01-10 NOTE — TELEPHONE ENCOUNTER
Davis is a 54 yo presented to [facility] with large bowel obstruction now postop day 1 ex lap, sigmoid colectomy, Jo's procedure, repair of bladder perforation by Gen surg.  Bladder injury repaired by general surgery intraoperatively and Hannon placed.  Patient to be discharged with Hannon catheter in place. Cystogram ordered in 2 weeks.  Patient not known to our practice, not seen by urology, will need AP and nursing visit for trial of void if cystogram negative.

## 2024-01-10 NOTE — PLAN OF CARE
Problem: OCCUPATIONAL THERAPY ADULT  Goal: Performs self-care activities at highest level of function for planned discharge setting.  See evaluation for individualized goals.  Description: Treatment Interventions: ADL retraining, Visual perceptual retraining, Patient/family training, Compensatory technique education, Endurance training, Activityengagement          See flowsheet documentation for full assessment, interventions and recommendations.   1/10/2024 1435 by Cathy Archibald OT  Outcome: Progressing  Note: Limitation: Decreased ADL status, Decreased self-care trans, Decreased high-level ADLs, Decreased endurance  Prognosis: Good  Assessment: Pt seen for OT tx session with focus on functional balance, functional mobility, ADL status, and transfer safety. Patient agreeable to OT treatment session. Pt received seated OOB to Recliner. Performed transfers with supervision. Pt continues to require assistance with various lines. Reviewed strategies to maximize independence with mobility.  Patient continues to be functioning below baseline level, occupational performance remains limited secondary to factors listed above, and pt at increased risk for falls and injury.  From OT standpoint, recommendation at time of d/c would be level 3 resources. Patient to benefit from continued Occupational Therapy treatment while in the hospital to address deficits as defined above and maximize level of functional independence with ADLs and functional mobility. Pt left with call bell in reach, tray table in reach, needs met, bed alarm activated, SCDs on.     Rehab Resource Intensity Level, OT: III (Minimum Resource Intensity)

## 2024-01-10 NOTE — PROGRESS NOTES
"Progress Note - General Surgery   Davis Goss 53 y.o. male MRN: [id number]  Unit/Bed#: -01 Encounter: [id number]    Assessment:  52yo M presenting with LBO now POD#1 s/p ex lap, sigmoid colectomy, Hartmans procedure, repair of bladder perforation   - afebrile, tachycardic to 115, remainder of VSS   - UOP 1.5L, juan colored, benson catheter in place   - NGT 500cc bilious  - Abdominal drains   R Superior 200cc ss  R Inferior 95cc ss  - Ostomy functioning with stool in bag   -WBC 11.1  - hemoglobin stable   - Cr 1.07  - CEA WNL 1.5    Hypomagnesemia   - mg 1.6    Plan:  - NPO/NGT to LIWS   - keep benson , will need outpatient urology follow up established, will reach out to urology team today to schedule.   - cont ancef/flagyl   - follow up tissue path   - Strict I/Os  - elyte repletion  - PRN analgesia via PCA, antiemetics   - DVT ppx, OOB/ambulation as tolerated   - PT/OT eval and treat   - reminder of medical management per primary team     Subjective/Objective   Subjective: No acute events overnight. Patient feels well this morning. Utilizing PCA for analgesic control. Denies N/V. Resting comfortably in bed.  Good UOP with benson in place. Denies SOB, chest pain , fever/chills.     Objective:   Blood pressure 154/92, pulse 100, temperature 97.6 °F (36.4 °C), temperature source Tympanic, resp. rate 18, height 5' 11\" (1.803 m), weight 70.6 kg (155 lb 9.6 oz), SpO2 95%.,Body mass index is 21.7 kg/m².      Intake/Output Summary (Last 24 hours) at 1/10/2024 0739  Last data filed at 1/10/2024 0700  Gross per 24 hour   Intake 5347.43 ml   Output 2490 ml   Net 2857.43 ml       Invasive Devices       Peripheral Intravenous Line  Duration             Peripheral IV 01/07/24 Left;Ventral (anterior) Forearm 2 days    Peripheral IV 01/09/24 Left;Upper;Ventral (anterior) Arm <1 day              Drain  Duration             NG/OG/Enteral Tube Nasogastric 12 Fr Right nare 1 day    Closed/Suction Drain Lateral;Right Abdomen " Bulb 15 Fr. <1 day    Closed/Suction Drain Right Abdomen Bulb 15 Fr. <1 day    Urethral Catheter Latex 16 Fr. <1 day                    Physical Exam  Vitals reviewed.   Constitutional:       General: He is not in acute distress.     Appearance: He is ill-appearing. He is not toxic-appearing.   HENT:      Head: Normocephalic and atraumatic.   Cardiovascular:      Rate and Rhythm: Normal rate.   Pulmonary:      Effort: No respiratory distress.   Abdominal:      General: There is no distension.      Palpations: Abdomen is soft.      Tenderness: There is abdominal tenderness (appropriate). There is no guarding or rebound.      Comments: Midline abdominal wound with mepilex in place, L abdominal colostomy with gas and stool in bag  JOE x2 R low abdomen, both ss, to bulb suction    Musculoskeletal:      Right lower leg: No edema.      Left lower leg: No edema.   Skin:     General: Skin is warm and dry.   Neurological:      General: No focal deficit present.      Mental Status: He is alert and oriented to person, place, and time.            Lab, Imaging and other studies:I have personally reviewed pertinent lab results.  , CBC:   Lab Results   Component Value Date    WBC 11.14 (H) 01/10/2024    HGB 12.2 01/10/2024    HCT 35.9 (L) 01/10/2024    MCV 85 01/10/2024     01/10/2024    RBC 4.21 01/10/2024    MCH 29.0 01/10/2024    MCHC 34.0 01/10/2024    RDW 15.0 01/10/2024    MPV 10.5 01/10/2024    NRBC 0 01/10/2024   , CMP:   Lab Results   Component Value Date    SODIUM 139 01/10/2024    K 4.3 01/10/2024     01/10/2024    CO2 29 01/10/2024    BUN 19 01/10/2024    CREATININE 1.07 01/10/2024    CALCIUM 8.2 (L) 01/10/2024    EGFR 79 01/10/2024       CT chest w contrast    Result Date: 1/9/2024  Impression: No findings suspicious for metastatic disease. Nonemergent findings above. Workstation performed: [id number]     XR chest pa & lateral    Result Date: 1/8/2024  Impression: Right base infiltrate The study was marked  "in EPIC for immediate notification. Workstation performed: [id number]       VTE PPX:  VTE Pharmacologic Prophylaxis: Enoxaparin (Lovenox)  VTE Mechanical Prophylaxis: SCDs, ambulation as able     Izabel Holguin PA-C  1/10/2024     7:39 AM   **Please Note: Portions of the record may have been created using voice recognition software.  Occasional wrong word or \"sound a like\" substitutions may have occurred due to the inherent limitations of voice recognition software.  Read the chart carefully and recognize, using context, where substitutions have occurred.**    "

## 2024-01-10 NOTE — PLAN OF CARE
Problem: OCCUPATIONAL THERAPY ADULT  Goal: Performs self-care activities at highest level of function for planned discharge setting.  See evaluation for individualized goals.  Description: Treatment Interventions: ADL retraining, Functional transfer training, Endurance training, Patient/family training, Compensatory technique education, Activityengagement          See flowsheet documentation for full assessment, interventions and recommendations.   Note: Limitation: Decreased ADL status, Decreased self-care trans, Decreased high-level ADLs, Decreased endurance  Prognosis: Good  Assessment: Pt is a 53 y.o. male seen for OT evaluation at [facility], admitted 1/7/2024 w/ abdominal pain and constipation for the past 2 weeks. Pt found to have a Colonic mass.  Pt now s/p ex lap, sigmoid colectomy, Hartmans procedure, repair of bladder perforation. Comorbidities affecting pt's functional performance at time of assessment include: drug abuse, tobacco use disorder.  Prior to admission, pt was living alone in an apt. Pt was I w/  ADLS and IADLS, (+) drove, & required no DME/AD PTA. Upon D/C, pt to stay with mother in a house with 1st floor set-up. Upon evaluation, pt appears to be performing below baseline functional status. Pt currently requires sup for bed mobility, sup-min A x 1 for functional mobility/transfers, sup-min A for UB ADLs and min A for LB ADLS 2* the following deficits impacting occupational performance: weakness, decreased tolerance, and decreased coping skills. Full objective findings from OT assessment regarding body systems outlined above. Personal factors affecting pt at time of IE include:difficulty performing ADLS, difficulty performing IADLS , health management , and decreased functional mobility . These impairments, as well as risk for falls  limit pt's ability to safely engage in all baseline areas of occupation and mobility. Pt to benefit from continued skilled OT tx while in the  hospital to address deficits as defined above and maximize level of functional independence w ADL's and functional mobility. Occupational Performance areas to address include: bathing/shower, toilet hygiene, dressing, and functional mobility.  This evaluation required an extensive review of medical and/or therapy records and additional review of physical, cognitive and psychosocial history related to functional performance. Based upon functional performance deficits and assessments, this evaluation has been identified as a high complexity evaluation.  At this time, OT recommendations at time of discharge are level 3 resources.     Rehab Resource Intensity Level, OT: III (Minimum Resource Intensity)

## 2024-01-10 NOTE — PHYSICAL THERAPY NOTE
PHYSICAL THERAPY Evaluation and Treatment  DATE: 01/10/24  TIME: 6731-0142    NAME:  Davis Goss  AGE:   53 y.o.  Mrn:   [id number]  Length Of Stay: 3    ADMIT DX:  Colonic obstruction (HCC) [K56.609]  Constipation [K59.00]  Colonic mass [K63.89]    History reviewed. No pertinent past medical history.  Past Surgical History:   Procedure Laterality Date    KNEE SURGERY Right         01/10/24 1014   PT Last Visit   PT Visit Date 01/10/24   Note Type   Note type Evaluation   Pain Assessment   Pain Assessment Tool 0-10   Pain Score No Pain   Pain Location/Orientation Location: Abdomen   Hospital Pain Intervention(s) Repositioned   Restrictions/Precautions   Weight Bearing Precautions Per Order No   Other Precautions Chair Alarm;Bed Alarm;Multiple lines;O2;Pain;Fall Risk   Home Living   Pt typically lives alone in an apt. Upon D/C, plans to stay at mother's home. Mother lives in a 2 . Pt plans to stay on 1st floor. There is no 1st floor bathroom access. Pt currently has a colostomy. Plans to use a urinal when staying on 1st floor. Upstairs bathroom is equipped with tub with grab bars.    Prior Function   Prior to admission: independent with ADL, IADL, drive (+), works full-time in a warehouse.   General   Additional Pertinent History 54 y/o presents due to constipation, abdominal pain/distention, weight loss, N/V, no BM for 3 weeks. Dx with bowel obstruction and sigmoid mass, but left AMA.  Pt returned the next day for re-evaluation.  Upon assessment: tachycardic, HTN.  Dx: colonic obstruction.  Pt underwent ex lap, sigmoid colectomy, colostomy, bladder repair, Jo's procedure on 1/9.   Family/Caregiver Present Yes   Cognition   Overall Cognitive Status WFL   Arousal/Participation Alert   Orientation Level Oriented to person;Oriented to place;Oriented to situation   Following Commands Follows multistep commands with increased time or repetition   Subjective   Subjective Pt agreeable to participate in eval and  get to recliner chair.   RUE Assessment   RUE Assessment WFL   LUE Assessment   LUE Assessment WFL   RLE Assessment   RLE Assessment WFL   LLE Assessment   LLE Assessment WFL   Coordination   Movements are Fluid and Coordinated 1   Sensation WFL   Bed Mobility   Supine to Sit 5  Supervision   Additional items HOB elevated;Bedrails;Increased time required;Impulsive;Verbal cues   Transfers   Sit to Stand 5  Supervision   Additional items Bedrails;Increased time required;Impulsive;Verbal cues   Stand to Sit 5  Supervision   Additional items Increased time required;Impulsive;Verbal cues;Armrests   Stand pivot 5  Supervision   Additional items Bedrails;Armrests;Increased time required;Impulsive;Verbal cues   Ambulation/Elevation   Ambulation/Elevation Additional Comments Unable to progress due to length of tubing/wires.   Balance   Static Sitting Fair   Dynamic Sitting Fair   Static Standing Fair   Dynamic Standing Fair   Activity Tolerance   Activity Tolerance Patient tolerated treatment well   Nurse Made Aware nursing notified of performance   Assessment   Prognosis Fair   Problem List Impaired balance;Decreased strength;Decreased mobility;Decreased safety awareness;Pain   Assessment Orders for PT eval and treat received. Pt's PMHx: right knee sx. Pt exhibits physical deficits noted in problem list above.  Deficits listed contribute to functional limitations that are significant from the patient PLOF and include: difficulty with bed mobility, tolerance for OOB functional activities, and unsafe/inefficient ambulation    During today's session, activity limited due to multiple tubes/wires and pt's disinterest in formal assessment/evaluation.  Pt often not forthcoming regarding subjective self assessment or home/PLOF.  Pt difficulty to educate as he is easily distractible and perseverates on non-issues.  When attempting to instruct on proper transfer technique to manage effectively and minimize pain, pt denies assistance  and no instruction is followed.  Pt eventually able to manage movement with hospital bed features and slowed pace, but exhibits instability in standing and reports dizziness when getting to sitting.  Heavy use of BUE to manage balance and safety.  Pt refusing hands on assistance, so close SBA provided to ensure safety.  Pt reports comfort once sitting in recliner.    The AM-[facility] & Barthel Index outcome tools were used to assist in determining pt safety w/ mobility/self care & appropriate d/c recommendations, see above for scores. Patient's clinical presentation is evolving due to significant acute change in functional independence, recent surgery/procedure, and ongoing medical management needs.     Considering the patient's PLOF, co-morbidities, acute functional limitations, functional outcome measures, and/or goal to progress functional independence; this patient would benefit from skilled Physical Therapy intervention in the acute care setting.   Barriers to Discharge None   Goals   Patient Goals none reported   [facility] Expiration Date 01/20/24   Short Term Goal #1 1: Pt will perform rolling to either side in flat bed, independently.  2: Pt will perform sit<>supine on flat bed, independently.  3: Pt will perform stand pivot transfer without AD, independently.  4: Pt will ambulate 300' without AD, independently. 5: Pt will perform written HEP, without cues. 6: Pt will ascend/descend 1 flight of stairs with hand rail, mod I.   Plan   Treatment/Interventions Functional transfer training;LE strengthening/ROM;Elevations;Therapeutic exercise;Endurance training;Patient/family training;Equipment eval/education;Bed mobility;Gait training   PT Frequency 2-3x/wk   Discharge Recommendation   Rehab Resource Intensity Level, PT III (Minimum Resource Intensity)   AM-PAC Basic Mobility Inpatient   Turning in Flat Bed Without Bedrails 3   Lying on Back to Sitting on Edge of Flat Bed Without Bedrails 3   Moving Bed to Chair 3   Standing  Up From Chair Using Arms 3   Walk in Room 3   Climb 3-5 Stairs With Railing 4   Basic Mobility Inpatient Raw Score 19   Basic Mobility Standardized Score 42.48   Highest Level Of Mobility   -HLM Goal 6: Walk 10 steps or more   -HLM Achieved 4: Move to chair/commode   Barthel Index   Feeding 10   Bathing 0   Grooming Score 5   Dressing Score 5   Bladder Score 10   Bowels Score 10   Toilet Use Score 10   Transfers (Bed/Chair) Score 10   Mobility (Level Surface) Score 0   Stairs Score 5   Barthel Index Score 65   Additional Treatment Session   Start Time 1028   End Time 1036   Treatment Assessment Educated and instructed pt on bed mobility and transfer techniques to manage pain and perform efficiently with safe movements.  Poor carry over due to disinterest in following instructions.   End of Consult   Patient Position at End of Consult Bedside chair;All needs within reach;Bed/Chair alarm activated   End of Consult Comments \   The patient's AM-PAC Basic Mobility Inpatient Short Form Raw Score is 19. A Raw score of greater than or equal to 16 suggests the patient may benefit from discharge to home. Please also refer to the recommendation of the Physical Therapist for safe discharge planning.    Hussain Wagner, PT, DPT  [place] Licensure #[id number]

## 2024-01-10 NOTE — ASSESSMENT & PLAN NOTE
Patient presented to the ED on 1/6 for complaints of ongoing constipation accompanied by weight loss of there past year. Patient stating at the end of December he had loss of appetite due to nausea and vomiting following each meal. Workup identified colonic mass. Pt left AMA but returned on 1/7 for further evaluation.   CT A/P: Partial colonic obstruction 2/2 7 cm long apple core lesion of mid sigmoid colon suspicious for colonic malignancy.  GI following, initially plan for flex sig but surgery to obtain tissue samples  Will need CEA, CT chest and oncology follow up pending course  S/p soap sudds enema x3, NPO with NGT & IVFs  General surgery following, patient status post postop day 1 from ex lap sigmoid colectomy with Jo's procedure and repair of bladder perforation  With abnormal EKG on admission, however Echo was normal  Pain management, antiemetics

## 2024-01-10 NOTE — OCCUPATIONAL THERAPY NOTE
Occupational Therapy Evaluation     Patient Name: Davis Goss  Today's Date: 1/10/2024  Problem List  Principal Problem:    Colonic mass  Active Problems:    Smoking    Substance use    Elevated blood pressure reading    Colonic obstruction (HCC)    Past Medical History  History reviewed. No pertinent past medical history.  Past Surgical History  Past Surgical History:   Procedure Laterality Date    HARTMANS PROCEDURE N/A 1/9/2024    Procedure: EXPLORATORY LAPAROTOMY, SIGMOID COLECTOMY AND COLOSTOMY REPAIR OF BLADDER PERFORATION  HARTMANS PROCEDURE;  Surgeon: Robles Cheek MD;  Location:  MAIN OR;  Service: General    KNEE SURGERY Right          01/10/24 1040   OT Last Visit   OT Visit Date 01/10/24   Note Type   Note type Evaluation   Pain Assessment   Pain Assessment Tool 0-10   Pain Score No Pain   Pain Location/Orientation Location: Abdomen   Hospital Pain Intervention(s) Repositioned   Restrictions/Precautions   Weight Bearing Precautions Per Order No   Other Precautions Chair Alarm;Bed Alarm;Fall Risk;Pain  (+ NG, + bulb drains, + O2, + PCA pump)   Home Living   Home Equipment   (no AD at baseline.)   Additional Comments Pt typically lives alone in an apt. Upon D/C, plans to stay at mother's home. Mother lives in a 2 . Pt plans to stay on 1st floor. There is no 1st floor bathroom access. Pt currently has a colostomy. Plans to use a urinal when staying on 1st floor. Upstairs bathroom is equipped with tub with grab bars.   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 Pt irritable at times, but agreeable to work with therapy.   ADL   Eating Assistance Unable to assess   Eating Deficit NPO   Grooming Assistance 5  Supervision/Setup   UB Bathing Assistance 4  Minimal Assistance   LB Bathing Assistance 4  Minimal  "Assistance   UB Dressing Assistance 4  Minimal Assistance   LB Dressing Assistance 4  Minimal Assistance   Toileting Deficit   (Pt currently with colostomy/ benson catheter.)   Bed Mobility   Supine to Sit 5  Supervision   Additional items Verbal cues;Increased time required   Additional Comments Attempted to educate on log rolling. Pt states \"I know how to get up\". Pt iniated transfer by swinging legs over side of bed, however had difficulty raising back off of bed. Requried VC for appropriate body mechanics to successfully complete transfer. Able to complerte with VC for hand placement and truncal adjustments.   Transfers   Sit to Stand 5  Supervision   Additional items Verbal cues;Armrests   Stand to Sit 5  Supervision   Additional items Verbal cues;Armrests   Stand pivot 5  Supervision   Additional items Assist x 1;Verbal cues  (no AD, required min A x 1 just for line mgmt)   Balance   Static Sitting Fair +   Dynamic Sitting Fair   Static Standing Fair -   Dynamic Standing Fair -   Activity Tolerance   Activity Tolerance Patient limited by fatigue   Medical Staff Made Aware PT Mac   Nurse Made Aware RN Barry ROMEO Assessment   RUE Assessment WFL   LUE Assessment   LUE Assessment WFL   Cognition   Arousal/Participation Alert   Attention Attends with cues to redirect   Orientation Level Oriented X4   Memory Decreased recall of recent events   Following Commands Follows multistep commands without difficulty   Comments Pt perseverating over not eating/drinking.   Assessment   Limitation Decreased ADL status;Decreased self-care trans;Decreased high-level ADLs;Decreased endurance   Prognosis Good   Assessment Pt is a 53 y.o. male seen for OT evaluation at [facility], admitted 1/7/2024 w/ abdominal pain and constipation for the past 2 weeks. Pt found to have a Colonic mass.  Pt now s/p ex lap, sigmoid colectomy, Hartmans procedure, repair of bladder perforation. Comorbidities affecting pt's functional " performance at time of assessment include: drug abuse, tobacco use disorder.  Prior to admission, pt was living alone in an apt. Pt was I w/  ADLS and IADLS, (+) drove, & required no DME/AD PTA. Upon D/C, pt to stay with mother in a house with 1st floor set-up. Upon evaluation, pt appears to be performing below baseline functional status. Pt currently requires sup for bed mobility, sup-min A x 1 for functional mobility/transfers, sup-min A for UB ADLs and min A for LB ADLS 2* the following deficits impacting occupational performance: weakness, decreased tolerance, and decreased coping skills. Full objective findings from OT assessment regarding body systems outlined above. Personal factors affecting pt at time of IE include:difficulty performing ADLS, difficulty performing IADLS , health management , and decreased functional mobility . These impairments, as well as risk for falls  limit pt's ability to safely engage in all baseline areas of occupation and mobility. Pt to benefit from continued skilled OT tx while in the hospital to address deficits as defined above and maximize level of functional independence w ADL's and functional mobility. Occupational Performance areas to address include: bathing/shower, toilet hygiene, dressing, and functional mobility.  This evaluation required an extensive review of medical and/or therapy records and additional review of physical, cognitive and psychosocial history related to functional performance. Based upon functional performance deficits and assessments, this evaluation has been identified as a high complexity evaluation.  At this time, OT recommendations at time of discharge are level 3 resources.   Goals   Patient Goals Pt wishes to be able to eat/drink   Plan   Treatment Interventions ADL retraining;Functional transfer training;Endurance training;Patient/family training;Compensatory technique education;Activityengagement   Goal Expiration Date 01/20/24   OT Treatment  Day 0   OT Frequency 2-3x/wk   Discharge Recommendation   Rehab Resource Intensity Level, OT III (Minimum Resource Intensity)   Additional Comments  The patient's raw score on the AM-PAC Daily Activity inpatient short form is 20, standardized score is 42.03, greater than 39.4. Patients at this level are likely to benefit from DC to home. However please refer to therapist recommendation for discharge planning given other factors that may influence destination.   AM-PAC Daily Activity Inpatient   Lower Body Dressing 3   Bathing 3   Toileting 3   Upper Body Dressing 3   Grooming 4   Eating 4   Daily Activity Raw Score 20   Daily Activity Standardized Score (Calc for Raw Score >=11) 42.03   AM-PAC Applied Cognition Inpatient   Following a Speech/Presentation 4   Understanding Ordinary Conversation 4   Taking Medications 4   Remembering Where Things Are Placed or Put Away 4   Remembering List of 4-5 Errands 4   Taking Care of Complicated Tasks 3   Applied Cognition Raw Score 23   Applied Cognition Standardized Score 53.08   End of Consult   Education Provided Yes;Family or social support of family present for education by provider   Patient Position at End of Consult Bedside chair;Bed/Chair alarm activated;All needs within reach   Nurse Communication Nurse aware of consult           Pt will achieve the following goals within 10 days.      *Pt will complete UB bathing and dressing with mod I.    *Pt will complete LB bathing and dressing with mod I .    * Pt will complete toileting w/ mod I w/ G hygiene/thoroughness using DME PRN    *Pt will complete bed mobility with mod I, with bed flat and no side rail to prep for purposeful tasks    *Pt will perform functional transfers with on/off all surfaces with mod I using DME as needed w/ G balance/safety.    *Patient will verbalize 3 safety awareness/ principles to prevent falls in the home setting.     *Pt will improve functional mobility during ADL/IADL/leisure  tasks to mod I using DME as needed w/ G balance/safety.       Cathy Archibald, OTR/L

## 2024-01-11 LAB
ALBUMIN SERPL BCP-MCNC: 2.9 G/DL (ref 3.5–5)
ALP SERPL-CCNC: 50 U/L (ref 34–104)
ALT SERPL W P-5'-P-CCNC: 10 U/L (ref 7–52)
ANION GAP SERPL CALCULATED.3IONS-SCNC: 7 MMOL/L
ANION GAP SERPL CALCULATED.3IONS-SCNC: 7 MMOL/L
AST SERPL W P-5'-P-CCNC: 28 U/L (ref 13–39)
BASOPHILS # BLD AUTO: 0.07 THOUSANDS/ÂΜL (ref 0–0.1)
BASOPHILS NFR BLD AUTO: 0 % (ref 0–1)
BILIRUB SERPL-MCNC: 0.7 MG/DL (ref 0.2–1)
BUN SERPL-MCNC: 15 MG/DL (ref 5–25)
BUN SERPL-MCNC: 16 MG/DL (ref 5–25)
CALCIUM ALBUM COR SERPL-MCNC: 9.1 MG/DL (ref 8.3–10.1)
CALCIUM SERPL-MCNC: 8.2 MG/DL (ref 8.4–10.2)
CALCIUM SERPL-MCNC: 8.4 MG/DL (ref 8.4–10.2)
CHLORIDE SERPL-SCNC: 100 MMOL/L (ref 96–108)
CHLORIDE SERPL-SCNC: 98 MMOL/L (ref 96–108)
CO2 SERPL-SCNC: 28 MMOL/L (ref 21–32)
CO2 SERPL-SCNC: 31 MMOL/L (ref 21–32)
CREAT SERPL-MCNC: 0.78 MG/DL (ref 0.6–1.3)
CREAT SERPL-MCNC: 0.88 MG/DL (ref 0.6–1.3)
EOSINOPHIL # BLD AUTO: 0.27 THOUSAND/ÂΜL (ref 0–0.61)
EOSINOPHIL NFR BLD AUTO: 2 % (ref 0–6)
ERYTHROCYTE [DISTWIDTH] IN BLOOD BY AUTOMATED COUNT: 14.8 % (ref 11.6–15.1)
GFR SERPL CREATININE-BSD FRML MDRD: 104 ML/MIN/1.73SQ M
GFR SERPL CREATININE-BSD FRML MDRD: 99 ML/MIN/1.73SQ M
GLUCOSE SERPL-MCNC: 112 MG/DL (ref 65–140)
GLUCOSE SERPL-MCNC: 64 MG/DL (ref 65–140)
HCT VFR BLD AUTO: 35.2 % (ref 36.5–49.3)
HGB BLD-MCNC: 11.7 G/DL (ref 12–17)
IMM GRANULOCYTES # BLD AUTO: 0.06 THOUSAND/UL (ref 0–0.2)
IMM GRANULOCYTES NFR BLD AUTO: 0 % (ref 0–2)
LYMPHOCYTES # BLD AUTO: 0.85 THOUSANDS/ÂΜL (ref 0.6–4.47)
LYMPHOCYTES NFR BLD AUTO: 5 % (ref 14–44)
MCH RBC QN AUTO: 28.2 PG (ref 26.8–34.3)
MCHC RBC AUTO-ENTMCNC: 33.2 G/DL (ref 31.4–37.4)
MCV RBC AUTO: 85 FL (ref 82–98)
MONOCYTES # BLD AUTO: 0.95 THOUSAND/ÂΜL (ref 0.17–1.22)
MONOCYTES NFR BLD AUTO: 6 % (ref 4–12)
NEUTROPHILS # BLD AUTO: 13.44 THOUSANDS/ÂΜL (ref 1.85–7.62)
NEUTS SEG NFR BLD AUTO: 87 % (ref 43–75)
NRBC BLD AUTO-RTO: 0 /100 WBCS
PLATELET # BLD AUTO: 260 THOUSANDS/UL (ref 149–390)
PMV BLD AUTO: 11.6 FL (ref 8.9–12.7)
POTASSIUM SERPL-SCNC: 3.8 MMOL/L (ref 3.5–5.3)
POTASSIUM SERPL-SCNC: 5.6 MMOL/L (ref 3.5–5.3)
PROT SERPL-MCNC: 6.1 G/DL (ref 6.4–8.4)
RBC # BLD AUTO: 4.15 MILLION/UL (ref 3.88–5.62)
SODIUM SERPL-SCNC: 135 MMOL/L (ref 135–147)
SODIUM SERPL-SCNC: 136 MMOL/L (ref 135–147)
WBC # BLD AUTO: 15.64 THOUSAND/UL (ref 4.31–10.16)

## 2024-01-11 PROCEDURE — 85025 COMPLETE CBC W/AUTO DIFF WBC: CPT | Performed by: INTERNAL MEDICINE

## 2024-01-11 PROCEDURE — 99232 SBSQ HOSP IP/OBS MODERATE 35: CPT

## 2024-01-11 PROCEDURE — 99024 POSTOP FOLLOW-UP VISIT: CPT | Performed by: SURGERY

## 2024-01-11 PROCEDURE — 80048 BASIC METABOLIC PNL TOTAL CA: CPT

## 2024-01-11 PROCEDURE — 80053 COMPREHEN METABOLIC PANEL: CPT | Performed by: INTERNAL MEDICINE

## 2024-01-11 RX ORDER — OXYCODONE HYDROCHLORIDE 10 MG/1
10 TABLET ORAL EVERY 6 HOURS PRN
Status: DISCONTINUED | OUTPATIENT
Start: 2024-01-11 | End: 2024-01-19

## 2024-01-11 RX ORDER — ACETAMINOPHEN 325 MG/1
650 TABLET ORAL EVERY 6 HOURS PRN
Status: DISCONTINUED | OUTPATIENT
Start: 2024-01-11 | End: 2024-01-13

## 2024-01-11 RX ORDER — HYDROMORPHONE HCL/PF 1 MG/ML
0.5 SYRINGE (ML) INJECTION EVERY 6 HOURS PRN
Status: DISCONTINUED | OUTPATIENT
Start: 2024-01-11 | End: 2024-01-17

## 2024-01-11 RX ORDER — KETOROLAC TROMETHAMINE 30 MG/ML
15 INJECTION, SOLUTION INTRAMUSCULAR; INTRAVENOUS EVERY 6 HOURS PRN
Status: DISCONTINUED | OUTPATIENT
Start: 2024-01-11 | End: 2024-01-13

## 2024-01-11 RX ORDER — DEXTROSE MONOHYDRATE, SODIUM CHLORIDE, AND POTASSIUM CHLORIDE 50; .745; 4.5 G/1000ML; G/1000ML; G/1000ML
100 INJECTION, SOLUTION INTRAVENOUS CONTINUOUS
Status: DISCONTINUED | OUTPATIENT
Start: 2024-01-11 | End: 2024-01-13

## 2024-01-11 RX ADMIN — DIPHENHYDRAMINE HYDROCHLORIDE 25 MG: 50 INJECTION, SOLUTION INTRAMUSCULAR; INTRAVENOUS at 04:38

## 2024-01-11 RX ADMIN — ACETAMINOPHEN 1000 MG: 10 INJECTION INTRAVENOUS at 22:56

## 2024-01-11 RX ADMIN — DEXTROSE, SODIUM CHLORIDE, AND POTASSIUM CHLORIDE 75 ML/HR: 5; .45; .075 INJECTION INTRAVENOUS at 10:56

## 2024-01-11 RX ADMIN — METRONIDAZOLE 500 MG: 500 INJECTION, SOLUTION INTRAVENOUS at 20:56

## 2024-01-11 RX ADMIN — METRONIDAZOLE 500 MG: 500 INJECTION, SOLUTION INTRAVENOUS at 12:01

## 2024-01-11 RX ADMIN — CEFAZOLIN SODIUM 1000 MG: 1 SOLUTION INTRAVENOUS at 18:40

## 2024-01-11 RX ADMIN — ACETAMINOPHEN 1000 MG: 10 INJECTION INTRAVENOUS at 04:16

## 2024-01-11 RX ADMIN — ENOXAPARIN SODIUM 40 MG: 40 INJECTION SUBCUTANEOUS at 10:50

## 2024-01-11 RX ADMIN — CEFAZOLIN SODIUM 1000 MG: 1 SOLUTION INTRAVENOUS at 04:23

## 2024-01-11 RX ADMIN — METRONIDAZOLE 500 MG: 500 INJECTION, SOLUTION INTRAVENOUS at 04:16

## 2024-01-11 RX ADMIN — ACETAMINOPHEN 1000 MG: 10 INJECTION INTRAVENOUS at 15:51

## 2024-01-11 RX ADMIN — CEFAZOLIN SODIUM 1000 MG: 1 SOLUTION INTRAVENOUS at 11:15

## 2024-01-11 RX ADMIN — ACETAMINOPHEN 1000 MG: 10 INJECTION INTRAVENOUS at 10:49

## 2024-01-11 RX ADMIN — HYDROMORPHONE HYDROCHLORIDE 0.5 MG: 1 INJECTION, SOLUTION INTRAMUSCULAR; INTRAVENOUS; SUBCUTANEOUS at 20:53

## 2024-01-11 NOTE — ASSESSMENT & PLAN NOTE
Patient presented to the ED on 1/6 for complaints of ongoing constipation accompanied by weight loss of there past year. Patient stating at the end of December he had loss of appetite due to nausea and vomiting following each meal. Workup identified colonic mass. Pt left AMA but returned on 1/7 for further evaluation.   CT A/P: Partial colonic obstruction 2/2 7 cm long apple core lesion of mid sigmoid colon suspicious for colonic malignancy.  CEA, CT chest: WNL  POD #2 Ex lap sigmoid colectomy with Jo's procedure and repair of bladder perforation  With abnormal EKG on admission, however Echo was normal  General surgery/ GI following,  Plan to remove NGT and Pelvic JOE drain. Likely need to DC with Bladder JOE drain  Initiate CLD  Continue Supportive care  Pain management, antiemetics  Pain will require outpatient follow up with Urology for further management of bladder perforation  Urinary catheter to remain upon discharge

## 2024-01-11 NOTE — PLAN OF CARE
Problem: PAIN - ADULT  Goal: Verbalizes/displays adequate comfort level or baseline comfort level  Description: Interventions:  - Encourage patient to monitor pain and request assistance  - Assess pain using appropriate pain scale  - Administer analgesics based on type and severity of pain and evaluate response  - Implement non-pharmacological measures as appropriate and evaluate response  - Consider cultural and social influences on pain and pain management  - Notify physician/advanced practitioner if interventions unsuccessful or patient reports new pain  1/10/2024 2051 by Bishop Gilliland RN  Outcome: Progressing  1/10/2024 2051 by Bishop Gilliland RN  Outcome: Progressing  1/10/2024 2050 by Bishop Gilliland RN  Outcome: Progressing     Problem: DISCHARGE PLANNING  Goal: Discharge to home or other facility with appropriate resources  Description: INTERVENTIONS:  - Identify barriers to discharge w/patient and caregiver  - Arrange for needed discharge resources and transportation as appropriate  - Identify discharge learning needs (meds, wound care, etc.)  - Arrange for interpretive services to assist at discharge as needed  - Refer to Case Management Department for coordinating discharge planning if the patient needs post-hospital services based on physician/advanced practitioner order or complex needs related to functional status, cognitive ability, or social support system  1/10/2024 2051 by Bishop Gilliland RN  Outcome: Progressing  1/10/2024 2051 by Bishop Gilliland RN  Outcome: Progressing  1/10/2024 2050 by Bishop Gilliland RN  Outcome: Progressing     Problem: Knowledge Deficit  Goal: Patient/family/caregiver demonstrates understanding of disease process, treatment plan, medications, and discharge instructions  Description: Complete learning assessment and assess knowledge base.  Interventions:  - Provide teaching at level of understanding  - Provide teaching via preferred learning methods  1/10/2024 2051 by  Bishop Gilliland RN  Outcome: Progressing  1/10/2024 2051 by Bishop Gilliland RN  Outcome: Progressing  1/10/2024 2050 by Bishop Gilliland RN  Outcome: Progressing     Problem: GASTROINTESTINAL - ADULT  Goal: Minimal or absence of nausea and/or vomiting  Description: INTERVENTIONS:  - Administer IV fluids if ordered to ensure adequate hydration  - Maintain NPO status until nausea and vomiting are resolved  - Nasogastric tube if ordered  - Administer ordered antiemetic medications as needed  - Provide nonpharmacologic comfort measures as appropriate  - Advance diet as tolerated, if ordered  - Consider nutrition services referral to assist patient with adequate nutrition and appropriate food choices  1/10/2024 2051 by Bishop Gilliland RN  Outcome: Progressing  1/10/2024 2051 by Bishop Gilliland RN  Outcome: Progressing  1/10/2024 2050 by Bishop Gilliland RN  Outcome: Progressing  Goal: Maintains or returns to baseline bowel function  Description: INTERVENTIONS:  - Assess bowel function  - Encourage oral fluids to ensure adequate hydration  - Administer IV fluids if ordered to ensure adequate hydration  - Administer ordered medications as needed  - Encourage mobilization and activity  - Consider nutritional services referral to assist patient with adequate nutrition and appropriate food choices  1/10/2024 2051 by Bishop Gilliland RN  Outcome: Progressing  1/10/2024 2051 by Bishop Gilliland RN  Outcome: Progressing  1/10/2024 2050 by Bishop Gilliland RN  Outcome: Progressing  Goal: Maintains adequate nutritional intake  Description: INTERVENTIONS:  - Monitor percentage of each meal consumed  - Identify factors contributing to decreased intake, treat as appropriate  - Assist with meals as needed  - Monitor I&O, weight, and lab values if indicated  - Obtain nutrition services referral as needed  1/10/2024 2051 by Bishop Gilliland RN  Outcome: Progressing  1/10/2024 2051 by Bishop Gilliland RN  Outcome: Progressing  1/10/2024 2050 by  Bishop Gilliland RN  Outcome: Progressing     Problem: Nutrition/Hydration-ADULT  Goal: Nutrient/Hydration intake appropriate for improving, restoring or maintaining nutritional needs  Description: Monitor and assess patient's nutrition/hydration status for malnutrition. Collaborate with interdisciplinary team and initiate plan and interventions as ordered.  Monitor patient's weight and dietary intake as ordered or per policy. Utilize nutrition screening tool and intervene as necessary. Determine patient's food preferences and provide high-protein, high-caloric foods as appropriate.     INTERVENTIONS:  - Monitor oral intake, urinary output, labs, and treatment plans  - Assess nutrition and hydration status and recommend course of action  - Evaluate amount of meals eaten  - Assist patient with eating if necessary   - Allow adequate time for meals  - Recommend/ encourage appropriate diets, oral nutritional supplements, and vitamin/mineral supplements  - Order, calculate, and assess calorie counts as needed  - Recommend, monitor, and adjust tube feedings and TPN/PPN based on assessed needs  - Assess need for intravenous fluids  - Provide specific nutrition/hydration education as appropriate  - Include patient/family/caregiver in decisions related to nutrition  1/10/2024 2051 by Bishop Gilliland RN  Outcome: Progressing  1/10/2024 2051 by Bishop Gilliland RN  Outcome: Progressing  1/10/2024 2050 by Bishop Gilliland, RN  Outcome: Progressing

## 2024-01-11 NOTE — PLAN OF CARE
Problem: PAIN - ADULT  Goal: Verbalizes/displays adequate comfort level or baseline comfort level  Description: Interventions:  - Encourage patient to monitor pain and request assistance  - Assess pain using appropriate pain scale  - Administer analgesics based on type and severity of pain and evaluate response  - Implement non-pharmacological measures as appropriate and evaluate response  - Consider cultural and social influences on pain and pain management  - Notify physician/advanced practitioner if interventions unsuccessful or patient reports new pain  Outcome: Progressing     Problem: DISCHARGE PLANNING  Goal: Discharge to home or other facility with appropriate resources  Description: INTERVENTIONS:  - Identify barriers to discharge w/patient and caregiver  - Arrange for needed discharge resources and transportation as appropriate  - Identify discharge learning needs (meds, wound care, etc.)  - Arrange for interpretive services to assist at discharge as needed  - Refer to Case Management Department for coordinating discharge planning if the patient needs post-hospital services based on physician/advanced practitioner order or complex needs related to functional status, cognitive ability, or social support system  Outcome: Progressing     Problem: Knowledge Deficit  Goal: Patient/family/caregiver demonstrates understanding of disease process, treatment plan, medications, and discharge instructions  Description: Complete learning assessment and assess knowledge base.  Interventions:  - Provide teaching at level of understanding  - Provide teaching via preferred learning methods  Outcome: Progressing     Problem: GASTROINTESTINAL - ADULT  Goal: Minimal or absence of nausea and/or vomiting  Description: INTERVENTIONS:  - Administer IV fluids if ordered to ensure adequate hydration  - Maintain NPO status until nausea and vomiting are resolved  - Nasogastric tube if ordered  - Administer ordered antiemetic  medications as needed  - Provide nonpharmacologic comfort measures as appropriate  - Advance diet as tolerated, if ordered  - Consider nutrition services referral to assist patient with adequate nutrition and appropriate food choices  Outcome: Progressing  Goal: Maintains or returns to baseline bowel function  Description: INTERVENTIONS:  - Assess bowel function  - Encourage oral fluids to ensure adequate hydration  - Administer IV fluids if ordered to ensure adequate hydration  - Administer ordered medications as needed  - Encourage mobilization and activity  - Consider nutritional services referral to assist patient with adequate nutrition and appropriate food choices  Outcome: Progressing  Goal: Maintains adequate nutritional intake  Description: INTERVENTIONS:  - Monitor percentage of each meal consumed  - Identify factors contributing to decreased intake, treat as appropriate  - Assist with meals as needed  - Monitor I&O, weight, and lab values if indicated  - Obtain nutrition services referral as needed  Outcome: Progressing     Problem: Nutrition/Hydration-ADULT  Goal: Nutrient/Hydration intake appropriate for improving, restoring or maintaining nutritional needs  Description: Monitor and assess patient's nutrition/hydration status for malnutrition. Collaborate with interdisciplinary team and initiate plan and interventions as ordered.  Monitor patient's weight and dietary intake as ordered or per policy. Utilize nutrition screening tool and intervene as necessary. Determine patient's food preferences and provide high-protein, high-caloric foods as appropriate.     INTERVENTIONS:  - Monitor oral intake, urinary output, labs, and treatment plans  - Assess nutrition and hydration status and recommend course of action  - Evaluate amount of meals eaten  - Assist patient with eating if necessary   - Allow adequate time for meals  - Recommend/ encourage appropriate diets, oral nutritional supplements, and  vitamin/mineral supplements  - Order, calculate, and assess calorie counts as needed  - Recommend, monitor, and adjust tube feedings and TPN/PPN based on assessed needs  - Assess need for intravenous fluids  - Provide specific nutrition/hydration education as appropriate  - Include patient/family/caregiver in decisions related to nutrition  Outcome: Progressing     Problem: PAIN - ADULT  Goal: Verbalizes/displays adequate comfort level or baseline comfort level  Description: Interventions:  - Encourage patient to monitor pain and request assistance  - Assess pain using appropriate pain scale  - Administer analgesics based on type and severity of pain and evaluate response  - Implement non-pharmacological measures as appropriate and evaluate response  - Consider cultural and social influences on pain and pain management  - Notify physician/advanced practitioner if interventions unsuccessful or patient reports new pain  Outcome: Progressing     Problem: DISCHARGE PLANNING  Goal: Discharge to home or other facility with appropriate resources  Description: INTERVENTIONS:  - Identify barriers to discharge w/patient and caregiver  - Arrange for needed discharge resources and transportation as appropriate  - Identify discharge learning needs (meds, wound care, etc.)  - Arrange for interpretive services to assist at discharge as needed  - Refer to Case Management Department for coordinating discharge planning if the patient needs post-hospital services based on physician/advanced practitioner order or complex needs related to functional status, cognitive ability, or social support system  Outcome: Progressing     Problem: PAIN - ADULT  Goal: Verbalizes/displays adequate comfort level or baseline comfort level  Description: Interventions:  - Encourage patient to monitor pain and request assistance  - Assess pain using appropriate pain scale  - Administer analgesics based on type and severity of pain and evaluate response  -  Implement non-pharmacological measures as appropriate and evaluate response  - Consider cultural and social influences on pain and pain management  - Notify physician/advanced practitioner if interventions unsuccessful or patient reports new pain  Outcome: Progressing     Problem: DISCHARGE PLANNING  Goal: Discharge to home or other facility with appropriate resources  Description: INTERVENTIONS:  - Identify barriers to discharge w/patient and caregiver  - Arrange for needed discharge resources and transportation as appropriate  - Identify discharge learning needs (meds, wound care, etc.)  - Arrange for interpretive services to assist at discharge as needed  - Refer to Case Management Department for coordinating discharge planning if the patient needs post-hospital services based on physician/advanced practitioner order or complex needs related to functional status, cognitive ability, or social support system  Outcome: Progressing     Problem: Knowledge Deficit  Goal: Patient/family/caregiver demonstrates understanding of disease process, treatment plan, medications, and discharge instructions  Description: Complete learning assessment and assess knowledge base.  Interventions:  - Provide teaching at level of understanding  - Provide teaching via preferred learning methods  Outcome: Progressing     Problem: GASTROINTESTINAL - ADULT  Goal: Minimal or absence of nausea and/or vomiting  Description: INTERVENTIONS:  - Administer IV fluids if ordered to ensure adequate hydration  - Maintain NPO status until nausea and vomiting are resolved  - Nasogastric tube if ordered  - Administer ordered antiemetic medications as needed  - Provide nonpharmacologic comfort measures as appropriate  - Advance diet as tolerated, if ordered  - Consider nutrition services referral to assist patient with adequate nutrition and appropriate food choices  Outcome: Progressing  Goal: Maintains or returns to baseline bowel function  Description:  INTERVENTIONS:  - Assess bowel function  - Encourage oral fluids to ensure adequate hydration  - Administer IV fluids if ordered to ensure adequate hydration  - Administer ordered medications as needed  - Encourage mobilization and activity  - Consider nutritional services referral to assist patient with adequate nutrition and appropriate food choices  Outcome: Progressing  Goal: Maintains adequate nutritional intake  Description: INTERVENTIONS:  - Monitor percentage of each meal consumed  - Identify factors contributing to decreased intake, treat as appropriate  - Assist with meals as needed  - Monitor I&O, weight, and lab values if indicated  - Obtain nutrition services referral as needed  Outcome: Progressing     Problem: Nutrition/Hydration-ADULT  Goal: Nutrient/Hydration intake appropriate for improving, restoring or maintaining nutritional needs  Description: Monitor and assess patient's nutrition/hydration status for malnutrition. Collaborate with interdisciplinary team and initiate plan and interventions as ordered.  Monitor patient's weight and dietary intake as ordered or per policy. Utilize nutrition screening tool and intervene as necessary. Determine patient's food preferences and provide high-protein, high-caloric foods as appropriate.     INTERVENTIONS:  - Monitor oral intake, urinary output, labs, and treatment plans  - Assess nutrition and hydration status and recommend course of action  - Evaluate amount of meals eaten  - Assist patient with eating if necessary   - Allow adequate time for meals  - Recommend/ encourage appropriate diets, oral nutritional supplements, and vitamin/mineral supplements  - Order, calculate, and assess calorie counts as needed  - Recommend, monitor, and adjust tube feedings and TPN/PPN based on assessed needs  - Assess need for intravenous fluids  - Provide specific nutrition/hydration education as appropriate  - Include patient/family/caregiver in decisions related to  nutrition  Outcome: Progressing

## 2024-01-11 NOTE — PLAN OF CARE
Problem: PAIN - ADULT  Goal: Verbalizes/displays adequate comfort level or baseline comfort level  Description: Interventions:  - Encourage patient to monitor pain and request assistance  - Assess pain using appropriate pain scale  - Administer analgesics based on type and severity of pain and evaluate response  - Implement non-pharmacological measures as appropriate and evaluate response  - Consider cultural and social influences on pain and pain management  - Notify physician/advanced practitioner if interventions unsuccessful or patient reports new pain  1/10/2024 2051 by Bishop Gilliland RN  Outcome: Progressing  1/10/2024 2050 by Bishop Gilliland RN  Outcome: Progressing     Problem: DISCHARGE PLANNING  Goal: Discharge to home or other facility with appropriate resources  Description: INTERVENTIONS:  - Identify barriers to discharge w/patient and caregiver  - Arrange for needed discharge resources and transportation as appropriate  - Identify discharge learning needs (meds, wound care, etc.)  - Arrange for interpretive services to assist at discharge as needed  - Refer to Case Management Department for coordinating discharge planning if the patient needs post-hospital services based on physician/advanced practitioner order or complex needs related to functional status, cognitive ability, or social support system  1/10/2024 2051 by Bishop Gilliland RN  Outcome: Progressing  1/10/2024 2050 by Bishop Gilliland RN  Outcome: Progressing     Problem: Knowledge Deficit  Goal: Patient/family/caregiver demonstrates understanding of disease process, treatment plan, medications, and discharge instructions  Description: Complete learning assessment and assess knowledge base.  Interventions:  - Provide teaching at level of understanding  - Provide teaching via preferred learning methods  1/10/2024 2051 by Bishop Gilliland RN  Outcome: Progressing  1/10/2024 2050 by Bishop Gilliland RN  Outcome: Progressing     Problem:  GASTROINTESTINAL - ADULT  Goal: Minimal or absence of nausea and/or vomiting  Description: INTERVENTIONS:  - Administer IV fluids if ordered to ensure adequate hydration  - Maintain NPO status until nausea and vomiting are resolved  - Nasogastric tube if ordered  - Administer ordered antiemetic medications as needed  - Provide nonpharmacologic comfort measures as appropriate  - Advance diet as tolerated, if ordered  - Consider nutrition services referral to assist patient with adequate nutrition and appropriate food choices  1/10/2024 2051 by Bishop Gilliland RN  Outcome: Progressing  1/10/2024 2050 by Bishop Gilliland RN  Outcome: Progressing  Goal: Maintains or returns to baseline bowel function  Description: INTERVENTIONS:  - Assess bowel function  - Encourage oral fluids to ensure adequate hydration  - Administer IV fluids if ordered to ensure adequate hydration  - Administer ordered medications as needed  - Encourage mobilization and activity  - Consider nutritional services referral to assist patient with adequate nutrition and appropriate food choices  1/10/2024 2051 by Bishop Gilliland RN  Outcome: Progressing  1/10/2024 2050 by Bishop Gilliland RN  Outcome: Progressing  Goal: Maintains adequate nutritional intake  Description: INTERVENTIONS:  - Monitor percentage of each meal consumed  - Identify factors contributing to decreased intake, treat as appropriate  - Assist with meals as needed  - Monitor I&O, weight, and lab values if indicated  - Obtain nutrition services referral as needed  1/10/2024 2051 by Bishop Gilliland RN  Outcome: Progressing  1/10/2024 2050 by Bishop Gilliland RN  Outcome: Progressing     Problem: Nutrition/Hydration-ADULT  Goal: Nutrient/Hydration intake appropriate for improving, restoring or maintaining nutritional needs  Description: Monitor and assess patient's nutrition/hydration status for malnutrition. Collaborate with interdisciplinary team and initiate plan and interventions as  ordered.  Monitor patient's weight and dietary intake as ordered or per policy. Utilize nutrition screening tool and intervene as necessary. Determine patient's food preferences and provide high-protein, high-caloric foods as appropriate.     INTERVENTIONS:  - Monitor oral intake, urinary output, labs, and treatment plans  - Assess nutrition and hydration status and recommend course of action  - Evaluate amount of meals eaten  - Assist patient with eating if necessary   - Allow adequate time for meals  - Recommend/ encourage appropriate diets, oral nutritional supplements, and vitamin/mineral supplements  - Order, calculate, and assess calorie counts as needed  - Recommend, monitor, and adjust tube feedings and TPN/PPN based on assessed needs  - Assess need for intravenous fluids  - Provide specific nutrition/hydration education as appropriate  - Include patient/family/caregiver in decisions related to nutrition  1/10/2024 2051 by Bishop Gilliland RN  Outcome: Progressing  1/10/2024 2050 by Bishop Gilliland RN  Outcome: Progressing     Problem: DISCHARGE PLANNING  Goal: Discharge to home or other facility with appropriate resources  Description: INTERVENTIONS:  - Identify barriers to discharge w/patient and caregiver  - Arrange for needed discharge resources and transportation as appropriate  - Identify discharge learning needs (meds, wound care, etc.)  - Arrange for interpretive services to assist at discharge as needed  - Refer to Case Management Department for coordinating discharge planning if the patient needs post-hospital services based on physician/advanced practitioner order or complex needs related to functional status, cognitive ability, or social support system  1/10/2024 2051 by Bishop Gilliland RN  Outcome: Progressing  1/10/2024 2050 by Bishop Gilliland RN  Outcome: Progressing

## 2024-01-11 NOTE — DISCHARGE INSTR - OTHER ORDERS
" Ostomy Care:  -Stoma Measurement: 1 1/2 inch by 1 3/4 inches (Measure stoma weekly for next 6-8 weeks- stoma will decrease in size due to decrease in swelling)     -Appliance Used During Bag Change: Coloplast Sensura [place] One Piece Flat Pouch Cut to Fit Pouch     *Can shower with pouch on or off. Make sure to dry off pouch after shower.     Bag Change Steps:  1. Peel back pouch using push-pull method, may use non-alcohol adhesive remover. Remove pouch from top to bottom.   2. Use warm water only to cleanse skin around the stoma (fifi-stomal skin).   3. Make sure all adhesive residue is removed and skin is dry and not oily.  4. Measure stoma size using measuring guide and trace correct measurements onto back of pouch.  5. Then cut or mold the backing of pouch out to correct shape/size.  6. Place pouch over stoma and onto skin.  7. Use warmth of hand to apply gentle pressure to help backing of pouch to adhere well to skin.    (Coloplast Pouches do not require use of Skin Prep Prior to Application. If using other brands of pouches, refer to product guide to determine the need for use of skin prep prior to the application of their pouches.)       **If the skin is open, moist or fragile, Crusting can be done prior to pouch application (before step 6) to create dry skin and assist with pouch adherence- steps are listed below.      TIPS:  Empty pouch when 1/3 -1/2 full.  Change pouch every 4-5 days or if signs of leaking.    Crusting as needed for moist, red, open skin around the stoma: (Done prior to pouch application)   Apply stoma powder to excoriation, move excess powder away with hand  Use no sting barrier to pat area to form \"crust\"  Repeat X2 before placing new ostomy pouch        Please call [facility] @ [phone] with any concerns, questions, or help needed.    Ostomy Online Education Resources:   Www.ostomy.org   Www.convatec.com   Www.[email or web address].us   [email or web address]    Ostomy " Suppliers:  [employer]--[phone] OR [email or web address]  [employer]--[phone] OR [email or web address]  [address] [place], [place] [address]cal--[phone] OR [email or web address]

## 2024-01-11 NOTE — PROGRESS NOTES
[facility]  Progress Note  Name: Davis Goss I  MRN: [id number]  Unit/Bed#: MS 333[facility]umberto I Date of Admission: 1/7/2024   Date of Service: 1/11/2024 I Hospital Day: 4    Assessment/Plan   * Colonic mass  Assessment & Plan  Patient presented to the ED on 1/6 for complaints of ongoing constipation accompanied by weight loss of there past year. Patient stating at the end of December he had loss of appetite due to nausea and vomiting following each meal. Workup identified colonic mass. Pt left AMA but returned on 1/7 for further evaluation.   CT A/P: Partial colonic obstruction 2/2 7 cm long apple core lesion of mid sigmoid colon suspicious for colonic malignancy.  CEA, CT chest: WNL  POD #2 Ex lap sigmoid colectomy with Jo's procedure and repair of bladder perforation  With abnormal EKG on admission, however Echo was normal  General surgery/ GI following,  Plan to remove NGT and Pelvic JOE drain. Likely need to DC with Bladder JOE drain  Initiate CLD  Continue Supportive care  Pain management, antiemetics  Pain will require outpatient follow up with Urology for further management of bladder perforation  Urinary catheter to remain upon discharge    Elevated blood pressure reading  Assessment & Plan  Pt with notable HTN on admission, Initially believed secondary to abdominal pain with nausea and vomiting but BP now persistently elevated.   No prior history of HTN  Monitor While admitted  Continue pain management    Substance use  Assessment & Plan  Pt stated he drinks liquor and beer multiple times a week. Unable to specify quantity.  Pt also noted occasional marijuana and coke use   UDS: (+) Opiates and Cocaine  It appears pt did receive Oral Oxy and IV dilaudid prior to drug screen  Continue [facility] protocol  Continue supportive care    Smoking  Assessment & Plan  PT smokes 1/2 PPD  Per pt, he does not need NRT because he only smokes because its there  NRT while admitted  Educated on smoking  cessation               VTE Pharmacologic Prophylaxis: VTE Score: 3 Moderate Risk (Score 3-4) - Pharmacological DVT Prophylaxis Ordered: enoxaparin (Lovenox).    Mobility:   Basic Mobility Inpatient Raw Score: 19  -HLM Goal: 6: Walk 10 steps or more  JH-HLM Achieved: 4: Move to chair/commode  HLM Goal NOT achieved. Continue with multidisciplinary rounding and encourage appropriate mobility to improve upon HLM goals.    Patient Centered Rounds: I performed bedside rounds with nursing staff today.   Discussions with Specialists or Other Care Team Provider: Gen Sx    Education and Discussions with Family / Patient: Updated  (mother) at bedside.    Total Time Spent on Date of Encounter in care of patient: 45 mins. This time was spent on one or more of the following: performing physical exam; counseling and coordination of care; obtaining or reviewing history; documenting in the medical record; reviewing/ordering tests, medications or procedures; communicating with other healthcare professionals and discussing with patient's family/caregivers.    Current Length of Stay: 4 day(s)  Current Patient Status: Inpatient   Certification Statement: The patient will continue to require additional inpatient hospital stay due to S/p Ex lap requiring continued medical management and pain management/ Surgical clearance for discharge once stable   Discharge Plan: Anticipate discharge in >72 hrs to home with home services.    Code Status: Level 1 - Full Code    Subjective:   Patient stating he can't handle too much information at once and needs only to know things closer to discharge. Patient stating he is comfortable and has no acute complaints of pain.    Objective:     Vitals:   Temp (24hrs), Av.6 °F (37 °C), Min:97.7 °F (36.5 °C), Max:99.6 °F (37.6 °C)    Temp:  [97.7 °F (36.5 °C)-99.6 °F (37.6 °C)] 98.8 °F (37.1 °C)  HR:  [] 93  Resp:  [15-22] 15  BP: (141-151)/() 149/96  SpO2:  [94 %-98 %] 96  %  Body mass index is 21.76 kg/m².     Input and Output Summary (last 24 hours):     Intake/Output Summary (Last 24 hours) at 1/11/2024 1443  Last data filed at 1/11/2024 1401  Gross per 24 hour   Intake 16.2 ml   Output 2350 ml   Net -2333.8 ml       Physical Exam:   Physical Exam  Vitals and nursing note reviewed.   Constitutional:       General: He is not in acute distress.  HENT:      Head: Normocephalic.      Nose: Nose normal. No congestion.      Mouth/Throat:      Mouth: Mucous membranes are moist.      Pharynx: Oropharynx is clear.   Cardiovascular:      Rate and Rhythm: Normal rate and regular rhythm.      Pulses: Normal pulses.      Heart sounds: No murmur heard.  Pulmonary:      Effort: Pulmonary effort is normal. No respiratory distress.   Abdominal:      General: Bowel sounds are normal.      Palpations: Abdomen is soft.      Comments: Mid Abd incision, Dressing CDI. Ostomy present. RUQ/RLQ JOE drains present.    Musculoskeletal:         General: Normal range of motion.      Cervical back: Normal range of motion.      Right lower leg: No edema.      Left lower leg: No edema.   Skin:     General: Skin is warm and dry.      Capillary Refill: Capillary refill takes less than 2 seconds.   Neurological:      Mental Status: He is alert and oriented to person, place, and time. Mental status is at baseline.   Psychiatric:         Mood and Affect: Mood is anxious.         Speech: Speech normal.         Behavior: Behavior is cooperative.         Judgment: Judgment is impulsive.          Additional Data:     Labs:  Results from last 7 days   Lab Units 01/11/24  0437   WBC Thousand/uL 15.64*   HEMOGLOBIN g/dL 11.7*   HEMATOCRIT % 35.2*   PLATELETS Thousands/uL 260   NEUTROS PCT % 87*   LYMPHS PCT % 5*   MONOS PCT % 6   EOS PCT % 2     Results from last 7 days   Lab Units 01/11/24  0437   SODIUM mmol/L 135   POTASSIUM mmol/L 5.6*   CHLORIDE mmol/L 100   CO2 mmol/L 28   BUN mg/dL 16   CREATININE mg/dL 0.88   ANION GAP  mmol/L 7   CALCIUM mg/dL 8.2*   ALBUMIN g/dL 2.9*   TOTAL BILIRUBIN mg/dL 0.70   ALK PHOS U/L 50   ALT U/L 10   AST U/L 28   GLUCOSE RANDOM mg/dL 64*     Results from last 7 days   Lab Units 01/08/24  0449   INR  1.35*     Results from last 7 days   Lab Units 01/09/24  1214   POC GLUCOSE mg/dl 134     Results from last 7 days   Lab Units 01/08/24  0449   HEMOGLOBIN A1C % 5.5     Results from last 7 days   Lab Units 01/06/24  1206   PROCALCITONIN ng/ml 0.08       Lines/Drains:  Invasive Devices       Peripheral Intravenous Line  Duration             Peripheral IV 01/09/24 Left;Upper;Ventral (anterior) Arm 1 day    Peripheral IV 01/10/24 Dorsal (posterior);Left Hand <1 day              Drain  Duration             Closed/Suction Drain Lateral;Right Abdomen Bulb 15 Fr. 2 days    Closed/Suction Drain Right Abdomen Bulb 15 Fr. 2 days    Colostomy LLQ 2 days    Urethral Catheter Latex 16 Fr. 2 days                  Urinary Catheter:  Goal for removal: N/A- Discharging with Hannon               Imaging: No pertinent imaging reviewed.    Recent Cultures (last 7 days):         Last 24 Hours Medication List:   Current Facility-Administered Medications   Medication Dose Route Frequency Provider Last Rate    acetaminophen  1,000 mg Intravenous Q6H PENELOPE Surekha Michelle PA-C 1,000 mg (01/11/24 1049)    cefazolin  1,000 mg Intravenous Q8H Surekha Michelle PA-C 1,000 mg (01/11/24 1115)    dextrose 5 % and sodium chloride 0.45 % with KCl 10 mEq/L  75 mL/hr Intravenous Continuous Mitchell Gill PA-C 75 mL/hr (01/11/24 1056)    diphenhydrAMINE  25 mg Intravenous Q6H PRN Surekha Michelle PA-C      enoxaparin  40 mg Subcutaneous Daily Surekha Michelle PA-C      hydrALAZINE  10 mg Intravenous Q6H PRN Shaun Sidhu DO      HYDROmorphone   Intravenous Continuous Surekha Michelle PA-C      ketorolac  15 mg Intravenous Q6H PRN Surekha Michelle PA-C      metoclopramide  5 mg Intravenous Q6H PRN Surekha Michelle PA-C      metroNIDAZOLE  500 mg Intravenous Q8H  Surekha Michelle PA-C 500 mg (01/11/24 1201)    naloxone  0.04 mg Intravenous Q1MIN PRN Surekha Michelle PA-C      naloxone  0.04 mg Intravenous Q3 min PRN Surekha Michelle PA-C      nicotine  1 patch Transdermal Daily Surekha Michelle PA-C      ondansetron  4 mg Intravenous Q6H PRN Surekha Michelle PA-C          Today, Patient Was Seen By: WES Peng    **Please Note: This note may have been constructed using a voice recognition system.**

## 2024-01-11 NOTE — PROGRESS NOTES
Progress Note - General Surgery   Davis Goss 53 y.o. male MRN: [id number]  Unit/Bed#: -01 Encounter: [id number]    Assessment:  1) 52yo M presenting with LBO now POD#2 s/p ex lap, sigmoid colectomy, Hartmans procedure, repair of bladder perforation -passing gas and significant volume of bowel movements from stoma, 50 mL of drain output, AVSS with exception of mild hypertension, leukocytosis of 15.64, slight hyperkalemia at 5.6, hemoglobin stable, abdominal pain controlled at rest currently, using incentive spirometry, 0.8 mL/kg/h, in place, improving from surgery no longer suffering from large bowel obstruction    Plan:  1)   -Continue JOE drain overlying bladder but remove pelvic drain  -Discontinue NG tube  -Start on clear liquids  -Decreased IV fluids and changed to D5 half-normal saline instead of lactated Ringer's  -CBC, BMP, mag, Phos with a.m. labs  -DVT prophylaxis  -Continue CIWA protocol  -Continue as needed analgesia  -I/O  -Continue Hannon and under no circumstances discontinue until 2-week follow-up    Subjective/Objective   Chief Complaint: I felt passing of gas and stool for the first time    Subjective: Patient was seen and examined at bedside.  Patient denies any acute events overnight.  Patient denies any fevers or chills.  Patient denies any significant abdominal pain at rest.  Patient reports he is using incentive spirometer being little hold up the blue portion of the tube pretty well.  Patient denies any nausea or vomiting.  Patient reports that he has been feeling stool and gas passed through his stoma for the first time.  Patient has quite a bit of stool collected in the bag already.  Patient stool is semiformed and brown.  Patient denies any distention or bloating.  Patient feels quite well.  Patient is eager to have the NG tube out and trial some liquids but does not want to get his hopes up currently.    Objective:     Blood pressure (!) 141/105, pulse 99, temperature 98.2 °F (36.8  "°C), temperature source Oral, resp. rate 16, height 5' 11\" (1.803 m), weight 70.8 kg (156 lb), SpO2 98%.,Body mass index is 21.76 kg/m².      Intake/Output Summary (Last 24 hours) at 1/11/2024 0900  Last data filed at 1/11/2024 0736  Gross per 24 hour   Intake 16.2 ml   Output 1625 ml   Net -1608.8 ml       Invasive Devices       Peripheral Intravenous Line  Duration             Peripheral IV 01/09/24 Left;Upper;Ventral (anterior) Arm 1 day    Peripheral IV 01/10/24 Dorsal (posterior);Left Hand <1 day              Drain  Duration             Colostomy LLQ 2 days    NG/OG/Enteral Tube Nasogastric 12 Fr Right nare 2 days    Urethral Catheter Latex 16 Fr. 2 days    Closed/Suction Drain Lateral;Right Abdomen Bulb 15 Fr. 1 day    Closed/Suction Drain Right Abdomen Bulb 15 Fr. 1 day                    Physical Exam: BP (!) 141/105 (BP Location: Right arm)   Pulse 99   Temp 98.2 °F (36.8 °C) (Oral)   Resp 16   Ht 5' 11\" (1.803 m)   Wt 70.8 kg (156 lb)   SpO2 98%   BMI 21.76 kg/m²   General appearance: alert and oriented, in no acute distress  Head: Normocephalic, without obvious abnormality, atraumatic  Lungs: clear to auscultation bilaterally  Heart: regular rate and rhythm, S1, S2 normal, no murmur, click, rub or gallop  Abdomen: soft, non-tender; bowel sounds normal; no masses,  no organomegaly  Skin: Skin color, texture, turgor normal. No rashes or lesions or incision is c/d/I, JPs are in place    Lab, Imaging and other studies:I have personally reviewed pertinent lab results.  , CBC:   Lab Results   Component Value Date    WBC 15.64 (H) 01/11/2024    HGB 11.7 (L) 01/11/2024    HCT 35.2 (L) 01/11/2024    MCV 85 01/11/2024     01/11/2024    RBC 4.15 01/11/2024    MCH 28.2 01/11/2024    MCHC 33.2 01/11/2024    RDW 14.8 01/11/2024    MPV 11.6 01/11/2024    NRBC 0 01/11/2024   , CMP:   Lab Results   Component Value Date    SODIUM 135 01/11/2024    K 5.6 (H) 01/11/2024     01/11/2024    CO2 28 01/11/2024 "    BUN 16 01/11/2024    CREATININE 0.88 01/11/2024    CALCIUM 8.2 (L) 01/11/2024    AST 28 01/11/2024    ALT 10 01/11/2024    ALKPHOS 50 01/11/2024    EGFR 99 01/11/2024     VTE Pharmacologic Prophylaxis: Enoxaparin (Lovenox)  VTE Mechanical Prophylaxis: sequential compression device

## 2024-01-11 NOTE — WOUND OSTOMY CARE
"Consult Note- Ostomy  Davis Goss 53 y.o. male  [id number]  --01    Assessment:  Patient is a 52 yo male admitted to [facility] for treatment of colonic mass. Patient underwent flynn's procedure with colostomy formation on 1/9/2024. NG tube in place. Patient seen today for ostomy teaching. Patient is POD#2. 2 family members noted at bedside. Patient hesitant but agreeable for ostomy teaching. Patient was able to remove pouch with minimal assistance from C RN but stated that he could not look at his stoma. Patient took notes at bedside about frequency of changes and emptying. Patient listened to 100% of ostomy teaching - one family member listened to ostomy care. Patient stated that he understands that he will have to take care of ostomy at home - reports that he likely will not have home health nurses due to not having insurance. Patient reports that ostomy is temporary.      Topics reviewed: normal & abdormal stoma appearance, how and when to empty the pouch (1/3- 1/2 full) to prevent pulling/lifting of the barrier, importance & how to clean the end of the pouch to prevent odor after pouch emptying, frequency of changing of the pouch (every 3-4 days on a schedule), one piece/two piece pouching systems differences, bathing, fifi-stomal skin care, how to measure the stoma/ cut the wafer to the correct size, how to close the pouch, use of [employer] catalog for ordering supplies, and how to obtain supplies.     Step-by-step pouch change done using coloplast one piece pouch. Reviewed with patient how and when to measure the stoma (weekly). Demonstrated how to cut one piece barrier, and how to apply it to the skin using gentle pressure / warmth of the hands. Good seal obtained.    Patient verbalized understanding of education and teaching. Patient is active learner. All questions and concerns answered. Encouraged patient to read the educational materials provided - \"Life after Colostomy Surgery\" by [facility]. "     Patient gave verbal permission to order sample kits from [employer], [employer], and [employer]. Additional discharge supplies and pouches provided to patient- left at beside.     Stoma appearance:   *Unable to picture stoma due to rover error.*  Stoma: Red and pink in color, round, budded stoma with center os, peristomal skin is intact. Stoma attached to skin junction, no separation noted. Small amount of brown liquid soft stool in bag. STOMA MEASUREMENT: 1 3/4 inches      Ostomy Care:  -Stoma Measurement: 1 3/4 inches (Measure stoma weekly for next 6-8 weeks- stoma will decrease in size due to decrease in swelling)     -Appliance Used During Bag Change: Coloplast Sensura Adalberto One Piece Flat Pouch Cut to Fit Pouch     *Can shower with pouch on or off. Make sure to dry off pouch after shower.     Bag Change Steps:  1. Peel back pouch using push-pull method, may use non-alcohol adhesive remover. Remove pouch from top to bottom.   2. Use warm water only to cleanse skin around the stoma (fifi-stomal skin).   3. Make sure all adhesive residue is removed and skin is dry and not oily.  4. Measure stoma size using measuring guide and trace correct measurements onto back of pouch.  5. Then cut or mold the backing of pouch out to correct shape/size.  6. Place pouch over stoma and onto skin.  7. Use warmth of hand to apply gentle pressure to help backing of pouch to adhere well to skin.    (Coloplast Pouches do not require use of Skin Prep Prior to Application. If using other brands of pouches, refer to product guide to determine the need for use of skin prep prior to the application of their pouches.)       **If the skin is open, moist or fragile, Crusting can be done prior to pouch application (before step 6) to create dry skin and assist with pouch adherence- steps are listed below.      TIPS:  Empty pouch when 1/3 -1/2 full.  Change pouch every 4-5 days or if signs of leaking.    Crusting as needed for moist, red, open  "skin around the stoma: (Done prior to pouch application)   Apply stoma powder to excoriation, move excess powder away with hand  Use no sting barrier to pat area to form \"crust\"  Repeat X2 before placing new ostomy pouch        Orders listed below and wound care will continue to follow, call or tiger text with questions. Bedside nurse updated of findings and orders. Flowsheets below with pictures and measurements.      Shanell Harvey RN, BSN, CWOCN   "

## 2024-01-12 ENCOUNTER — APPOINTMENT (INPATIENT)
Dept: RADIOLOGY | Facility: HOSPITAL | Age: 54
DRG: 329 | End: 2024-01-12
Payer: COMMERCIAL

## 2024-01-12 LAB
ANION GAP SERPL CALCULATED.3IONS-SCNC: 11 MMOL/L
ATRIAL RATE: 97 BPM
BASOPHILS # BLD AUTO: 0.04 THOUSANDS/ÂΜL (ref 0–0.1)
BASOPHILS NFR BLD AUTO: 0 % (ref 0–1)
BUN SERPL-MCNC: 13 MG/DL (ref 5–25)
CALCIUM SERPL-MCNC: 9.1 MG/DL (ref 8.4–10.2)
CHLORIDE SERPL-SCNC: 95 MMOL/L (ref 96–108)
CO2 SERPL-SCNC: 31 MMOL/L (ref 21–32)
CREAT SERPL-MCNC: 0.82 MG/DL (ref 0.6–1.3)
EOSINOPHIL # BLD AUTO: 0.22 THOUSAND/ÂΜL (ref 0–0.61)
EOSINOPHIL NFR BLD AUTO: 1 % (ref 0–6)
ERYTHROCYTE [DISTWIDTH] IN BLOOD BY AUTOMATED COUNT: 14.5 % (ref 11.6–15.1)
GFR SERPL CREATININE-BSD FRML MDRD: 102 ML/MIN/1.73SQ M
GLUCOSE SERPL-MCNC: 132 MG/DL (ref 65–140)
HCT VFR BLD AUTO: 38.3 % (ref 36.5–49.3)
HGB BLD-MCNC: 13.2 G/DL (ref 12–17)
IMM GRANULOCYTES # BLD AUTO: 0.08 THOUSAND/UL (ref 0–0.2)
IMM GRANULOCYTES NFR BLD AUTO: 1 % (ref 0–2)
LYMPHOCYTES # BLD AUTO: 0.63 THOUSANDS/ÂΜL (ref 0.6–4.47)
LYMPHOCYTES NFR BLD AUTO: 4 % (ref 14–44)
MAGNESIUM SERPL-MCNC: 2 MG/DL (ref 1.9–2.7)
MCH RBC QN AUTO: 28.4 PG (ref 26.8–34.3)
MCHC RBC AUTO-ENTMCNC: 34.5 G/DL (ref 31.4–37.4)
MCV RBC AUTO: 82 FL (ref 82–98)
MONOCYTES # BLD AUTO: 0.84 THOUSAND/ÂΜL (ref 0.17–1.22)
MONOCYTES NFR BLD AUTO: 5 % (ref 4–12)
NEUTROPHILS # BLD AUTO: 14.54 THOUSANDS/ÂΜL (ref 1.85–7.62)
NEUTS SEG NFR BLD AUTO: 89 % (ref 43–75)
NRBC BLD AUTO-RTO: 0 /100 WBCS
P AXIS: 60 DEGREES
PHOSPHATE SERPL-MCNC: 3.8 MG/DL (ref 2.7–4.5)
PLATELET # BLD AUTO: 326 THOUSANDS/UL (ref 149–390)
PMV BLD AUTO: 10.7 FL (ref 8.9–12.7)
POTASSIUM SERPL-SCNC: 3.5 MMOL/L (ref 3.5–5.3)
PR INTERVAL: 130 MS
QRS AXIS: 56 DEGREES
QRSD INTERVAL: 96 MS
QT INTERVAL: 358 MS
QTC INTERVAL: 454 MS
RBC # BLD AUTO: 4.65 MILLION/UL (ref 3.88–5.62)
SODIUM SERPL-SCNC: 137 MMOL/L (ref 135–147)
T WAVE AXIS: 67 DEGREES
VENTRICULAR RATE: 97 BPM
WBC # BLD AUTO: 16.35 THOUSAND/UL (ref 4.31–10.16)

## 2024-01-12 PROCEDURE — 99233 SBSQ HOSP IP/OBS HIGH 50: CPT

## 2024-01-12 PROCEDURE — 80048 BASIC METABOLIC PNL TOTAL CA: CPT | Performed by: PHYSICIAN ASSISTANT

## 2024-01-12 PROCEDURE — 85025 COMPLETE CBC W/AUTO DIFF WBC: CPT | Performed by: PHYSICIAN ASSISTANT

## 2024-01-12 PROCEDURE — 71045 X-RAY EXAM CHEST 1 VIEW: CPT

## 2024-01-12 PROCEDURE — 83735 ASSAY OF MAGNESIUM: CPT | Performed by: PHYSICIAN ASSISTANT

## 2024-01-12 PROCEDURE — 99024 POSTOP FOLLOW-UP VISIT: CPT | Performed by: SURGERY

## 2024-01-12 PROCEDURE — 84100 ASSAY OF PHOSPHORUS: CPT | Performed by: PHYSICIAN ASSISTANT

## 2024-01-12 PROCEDURE — 99024 POSTOP FOLLOW-UP VISIT: CPT | Performed by: PHYSICIAN ASSISTANT

## 2024-01-12 RX ADMIN — ACETAMINOPHEN 1000 MG: 10 INJECTION INTRAVENOUS at 10:03

## 2024-01-12 RX ADMIN — CEFAZOLIN SODIUM 1000 MG: 1 SOLUTION INTRAVENOUS at 11:34

## 2024-01-12 RX ADMIN — DEXTROSE, SODIUM CHLORIDE, AND POTASSIUM CHLORIDE 100 ML/HR: 5; .45; .075 INJECTION INTRAVENOUS at 17:43

## 2024-01-12 RX ADMIN — ONDANSETRON 4 MG: 2 INJECTION INTRAMUSCULAR; INTRAVENOUS at 03:58

## 2024-01-12 RX ADMIN — ACETAMINOPHEN 1000 MG: 10 INJECTION INTRAVENOUS at 20:30

## 2024-01-12 RX ADMIN — METRONIDAZOLE 500 MG: 500 INJECTION, SOLUTION INTRAVENOUS at 20:29

## 2024-01-12 RX ADMIN — ENOXAPARIN SODIUM 40 MG: 40 INJECTION SUBCUTANEOUS at 10:02

## 2024-01-12 RX ADMIN — CEFAZOLIN SODIUM 1000 MG: 1 SOLUTION INTRAVENOUS at 03:00

## 2024-01-12 RX ADMIN — CEFAZOLIN SODIUM 1000 MG: 1 SOLUTION INTRAVENOUS at 18:25

## 2024-01-12 RX ADMIN — DEXTROSE, SODIUM CHLORIDE, AND POTASSIUM CHLORIDE 75 ML/HR: 5; .45; .075 INJECTION INTRAVENOUS at 00:59

## 2024-01-12 RX ADMIN — METOCLOPRAMIDE 5 MG: 5 INJECTION, SOLUTION INTRAMUSCULAR; INTRAVENOUS at 00:40

## 2024-01-12 RX ADMIN — METRONIDAZOLE 500 MG: 500 INJECTION, SOLUTION INTRAVENOUS at 12:35

## 2024-01-12 RX ADMIN — ACETAMINOPHEN 1000 MG: 10 INJECTION INTRAVENOUS at 17:28

## 2024-01-12 RX ADMIN — SODIUM CHLORIDE, SODIUM LACTATE, POTASSIUM CHLORIDE, AND CALCIUM CHLORIDE 1000 ML: .6; .31; .03; .02 INJECTION, SOLUTION INTRAVENOUS at 14:22

## 2024-01-12 RX ADMIN — METRONIDAZOLE 500 MG: 500 INJECTION, SOLUTION INTRAVENOUS at 03:58

## 2024-01-12 RX ADMIN — KETOROLAC TROMETHAMINE 15 MG: 30 INJECTION, SOLUTION INTRAMUSCULAR at 01:02

## 2024-01-12 NOTE — PROGRESS NOTES
3L bilious output from NGT since placement at 0514 (8 hours). No output from ostomy today. Pt is sleeping, no PO intake whatsoever.     1L LR bolus ordered for now. Continue to monitor NGT/ostomy output and record accurate I/Os.    D/W attending.    Sandra Alexander  1/12/2024

## 2024-01-12 NOTE — UTILIZATION REVIEW
Continued Stay Review    Date:  1/10-1/12/24                   Current Patient Class:  Inpatient  Current Level of Care:  Med Surg    HPI:53 y.o. male initially admitted on 1/7/24.     1/9 Operative Report:  Procedure(s):  EXPLORATORY LAPAROTOMY. SIGMOID COLECTOMY AND COLOSTOMY REPAIR OF BLADDER PERFORATION  HARTMANS PROCEDURE  Anesthesia Type: General. Operative Findings: Obstructing mass in the distal sigmoid colon.  The mass was densely adhered to the pelvic sidewall on the left side.  It was also densely adhered with loss of all planes to the urinary bladder.  Proximal descending transverse and ascending colon were distended.  Ileum and proximal jejunum was also distended massively.  Multiple enlarged lymph nodes in the colonic mesentery.  The left ureter was not visualized.    1/10 General Surgery: POD#1 s/p ex lap, sigmoid colectomy, Hartmans procedure, repair of bladder perforation.  No acute events overnight. Utilizing PCA for analgesic control, denies N/V. UOP 1.5L, juan colored, benson catheter in place, NGT 500cc bilious.  Abdominal drains, R Superior 200ml  ss, R Inferior 95cc ss. Ostomy functioning with stool in bag. Hypomagnesemia.  Plan:  NPO/NGT to LIW. Keep benson, continue  cont ancef/flagyl ,  follow up tissue path , Strict I/Os, electrolyte repletion, PRN analgesia via PCA, anti-emetics. PT/OT eval.     1/11 General Surgery: POD#2. Passing gas and significant volume of bowel movements from stoma, 50 mL of drain output, AVSS with exception of mild hypertension, leukocytosis of 15.64, slight hyperkalemia at 5.6, hemoglobin stable, abdominal pain controlled at rest currently, using incentive spirometry, Plan:  Continue JOE drain overlying bladder but remove pelvic drain, D/C NGT, start on clear liquids. Decreased IVF and changed to D51/2 NSS, CBC, BMP, mag and phos with AM labs. Continue CIWA protocol, continue as needed analgesia  I/O. Continue Benson.     1/12 General Surgery:  POD #3.  Pt with  vomiting overnight, NGT replaced, nausea now resolved. XR reviewed, NGT in acceptable position  Abdomen soft, non-distended, non-tender, hypoactive bowel sounds, Mepelex in place over midline incision, JOE RLQ in place SS output  BP elevated, tachycardia 100s, remainder of VSS, afebrile. WBC 16.35 (15.64). UO 1.8L, Hannon in place. NGT output 800cc recorded, 1000cc in canister at time of re-evaluation (0900). 2L emesis overnight. JOE 100cc. Plan: Continue NGT/NPO, increase IVF to 100cc/hr, continue JOE  PRN analgesics/antiemetics. Ambulate, IS, continue Hannon.        Vital Signs:      Date/Time Temp Pulse Resp BP MAP (mmHg) SpO2 Calculated FIO2 (%) - Nasal Cannula O2 Flow Rate (L/min) Nasal Cannula O2 Flow Rate (L/min) O2 Device   01/12/24 1529 98.4 °F (36.9 °C) 100 16 147/100 117 95 % -- -- -- None (Room air)   01/12/24 1130 98 °F (36.7 °C) 94 17 138/97 -- 96 % -- -- -- None (Room air)   01/12/24 0320 98.7 °F (37.1 °C) 103 19 148/105 Abnormal  120 97 % -- -- -- None (Room air)   01/11/24 2228 98.5 °F (36.9 °C) 104 20 153/111 Abnormal  129 96 % -- -- -- None (Room air)   01/11/24 1411 98.8 °F (37.1 °C) 93 15 149/96 -- 96 % -- -- -- None (Room air)   01/11/24 1129 98.8 °F (37.1 °C) 96 18 151/109 Abnormal  -- 98 % -- -- -- None (Room air)   01/11/24 0921 -- -- -- -- -- -- -- -- -- None (Room air)   01/11/24 0903 -- -- -- -- -- 97 % -- -- -- None (Room air)   01/11/24 0736 98.2 °F (36.8 °C) 99 16 141/105 Abnormal  -- 98 % 28 -- 2 L/min Nasal cannula   01/11/24 0415 -- 100 18 -- -- 97 % -- -- -- --   01/11/24 0257 97.7 °F (36.5 °C) 104 20 151/102 Abnormal  121 98 % 28 -- 2 L/min Nasal cannula   01/10/24 2359 -- 98 22 -- -- 97 % -- -- -- --   01/10/24 2334 98.3 °F (36.8 °C) 101 15 145/105 Abnormal  123 95 % -- -- -- Nasal cannula   01/10/24 1959 -- 101 15 -- -- 94 % -- -- -- --   01/10/24 1638 -- -- -- -- -- 96 % -- -- -- None (Room air)   01/10/24 1505 99.6 °F (37.6 °C) 108 Abnormal  17 148/93 115 94 % 28 -- 2 L/min Nasal  cannula   01/10/24 1134 98.8 °F (37.1 °C) 121 Abnormal  18 149/89 -- 96 % -- -- -- Nasal cannula   01/10/24 0744 98.3 °F (36.8 °C) 115 Abnormal  16 147/98 -- 96 % -- -- -- --   01/10/24 0356 97.6 °F (36.4 °C) 100 18 154/92 119 95 % -- 1 L/min -- Nasal cannula   01/10/24 0008 -- -- -- -- -- 92 % -- -- -- EtCO2 nasal cannula         CIWA-Ar Score    Row Name 01/12/24 1529 01/12/24 1130 01/12/24 0320 01/11/24 2228 01/11/24 1411   CIWA-Ar   /100  -/97  -/105 Abnormal   -/111 Abnormal   -/96  -AH   Pulse 100  -JM 94  -  -  -DS 93  -AH   Row Name 01/11/24 1129 01/11/24 0736 01/11/24 0415 01/11/24 0257 01/10/24 2359   CIWA-Ar   /109 Abnormal   -/105 Abnormal   -JS -- 151/102 Abnormal   -IR --   Pulse 96  -AH 99  -  -  -IR 98  -CD   Nausea and Vomiting -- -- -- 0  -CB --   Tactile Disturbances -- -- -- 0  -CB --   Tremor -- -- -- 0  -CB --   Auditory Disturbances -- -- -- 0  -CB --   Paroxysmal Sweats -- -- -- 0  -CB --   Visual Disturbances -- -- -- 0  -CB --   Anxiety -- -- -- 0  -CB --   Headache, Fullness in Head -- -- -- 0  -CB --   Agitation -- -- -- 0  -CB --   Orientation and Clouding of Sensorium -- -- -- 0  -CB --   CIWA-Ar Total -- -- -- 0  -CB --   Row Name 01/10/24 2334 01/10/24 2300 01/10/24 1959 01/10/24 1505 01/10/24 1134   CIWA-Ar   /105 Abnormal   -IR -- -- 148/93  -/89  -AH   Pulse 101  -IR -- 101  - Abnormal   - Abnormal   -AH   Nausea and Vomiting 0  -CB 0  -DL 0  -CB -- --   Tactile Disturbances 0  -CB 0  -DL 0  -CB -- --   Tremor 0  -CB 0  -DL 0  -CB -- --   Auditory Disturbances 0  -CB 0  -DL 0  -CB -- --   Paroxysmal Sweats 0  -CB 0  -DL 0  -CB -- --   Visual Disturbances 0  -CB 0  -DL 0  -CB -- --   Anxiety 0  -CB 0  -DL 0  -CB -- --   Headache, Fullness in Head 0  -CB 0  -DL 0  -CB -- --   Agitation 0  -CB 0  -DL 0  -CB -- --   Orientation and Clouding of Sensorium 0  -CB 0  -DL 0  -CB -- --   CIWA-Ar Total 0   -CB 0  -DL 0  -CB -- --         Pertinent Labs/Diagnostic Results:     1/12 CXR:  Nasogastric tube courses below the level of the diaphragm and projects over the stomach. No acute cardiopulmonary pathology.   Dilated loops of air-filled small bowel are noted within the partially visualized abdomen. Consider follow-up with dedicated abdominal imaging as clinically warranted.         Results from last 7 days   Lab Units 01/12/24  0548 01/11/24  0437 01/10/24  0428 01/09/24  1525 01/08/24  0449   WBC Thousand/uL 16.35* 15.64* 11.14* 13.03* 7.01   HEMOGLOBIN g/dL 13.2 11.7* 12.2 12.1 13.2   HEMATOCRIT % 38.3 35.2* 35.9* 36.5 39.3   PLATELETS Thousands/uL 326 260 290 290 353   NEUTROS ABS Thousands/µL 14.54* 13.44* 9.65*  --  4.38         Results from last 7 days   Lab Units 01/12/24 0548 01/11/24  1521 01/11/24  0437 01/10/24  0428 01/09/24  1525 01/09/24  0613 01/08/24 0449   SODIUM mmol/L 137 136 135 139 137   < > 139   POTASSIUM mmol/L 3.5 3.8 5.6* 4.3 4.5   < > 4.2   CHLORIDE mmol/L 95* 98 100 102 101   < > 105   CO2 mmol/L 31 31 28 29 23   < > 27   ANION GAP mmol/L 11 7 7 8 13   < > 7   BUN mg/dL 13 15 16 19 19   < > 20   CREATININE mg/dL 0.82 0.78 0.88 1.07 0.97   < > 1.01   EGFR ml/min/1.73sq m 102 104 99 79 90   < > 85   CALCIUM mg/dL 9.1 8.4 8.2* 8.2* 8.1*   < > 8.5   MAGNESIUM mg/dL 2.0  --   --  1.6*  --   --  2.0   PHOSPHORUS mg/dL 3.8  --   --  4.4  --   --  2.6*    < > = values in this interval not displayed.     Results from last 7 days   Lab Units 01/11/24 0437 01/08/24 0449 01/06/24  1206   AST U/L 28 11* 16   ALT U/L 10 11 17   ALK PHOS U/L 50 51 60   TOTAL PROTEIN g/dL 6.1* 6.5 7.4   ALBUMIN g/dL 2.9* 3.5 3.9   TOTAL BILIRUBIN mg/dL 0.70 0.73 0.60     Results from last 7 days   Lab Units 01/09/24  1214   POC GLUCOSE mg/dl 134     Results from last 7 days   Lab Units 01/12/24  0548 01/11/24  1521 01/11/24  0437 01/10/24  0428 01/09/24  1525 01/09/24  0613 01/08/24  0449 01/06/24  1206   GLUCOSE  RANDOM mg/dL 132 112 64* 108 114 59* 83 110         Results from last 7 days   Lab Units 01/08/24  0449   HEMOGLOBIN A1C % 5.5   EAG mg/dl 111               Results from last 7 days   Lab Units 01/07/24  1931 01/07/24  1749 01/07/24  1532   HS TNI 0HR ng/L  --   --  7   HS TNI 2HR ng/L  --  7  --    HSTNI D2 ng/L  --  0  --    HS TNI 4HR ng/L 7  --   --    HSTNI D4 ng/L 0  --   --          Results from last 7 days   Lab Units 01/08/24  0449   PROTIME seconds 16.5*   INR  1.35*     Results from last 7 days   Lab Units 01/06/24  1206   TSH 3RD GENERATON uIU/mL 1.485     Results from last 7 days   Lab Units 01/06/24  1206   PROCALCITONIN ng/ml 0.08               Results from last 7 days   Lab Units 01/06/24  1206   LIPASE u/L 10*         Results from last 7 days   Lab Units 01/08/24  1501   CEA ng/mL 1.5                     Results from last 7 days   Lab Units 01/07/24  1817   AMPH/METH  Negative   BARBITURATE UR  Negative   BENZODIAZEPINE UR  Negative   COCAINE UR  Positive*   METHADONE URINE  Negative   OPIATE UR  Positive*   PCP UR  Negative   THC UR  Negative             Medications:   Scheduled Medications:      acetaminophen, 1,000 mg, Intravenous, Q6H PENELOPE  cefazolin, 1,000 mg, Intravenous, Q8H  enoxaparin, 40 mg, Subcutaneous, Daily  lactated ringers, 1,000 mL, Intravenous, Once  metroNIDAZOLE, 500 mg, Intravenous, Q8H  nicotine, 1 patch, Transdermal, Daily    magnesium sulfate 4 g/100 mL IVPB (premix) 4 g  Dose: 4 g  Freq: Once Route: IV  Last Dose: 4 g (01/10/24 1149)  Start: 01/10/24 1100 End: 01/10/24 1549       Continuous IV Infusions:      dextrose 5 % and sodium chloride 0.45 % with KCl 10 mEq/L, 100 mL/hr, Intravenous, Continuous      HYDROmorphone (DILAUDID) 1 mg/mL 50 mL PCA  Continuous Rate: 0 mg/hr  PCA Dose: 0.3 mg  PCA Lock-out: 5 Minutes  One Hour Limit: 2 mg  Freq: Continuous Route: IV  Last Dose: Stopped (01/11/24 1543)  Start: 01/09/24 1300 End: 01/11/24 1513         PRN Meds:    acetaminophen,  650 mg, Oral, Q6H PRN  diphenhydrAMINE, 25 mg, Intravenous, Q6H PRN  hydrALAZINE, 10 mg, Intravenous, Q6H PRN  HYDROmorphone, 0.5 mg, Intravenous, Q6H PRN  ketorolac, 15 mg, Intravenous, Q6H PRN  metoclopramide, 5 mg, Intravenous, Q6H PRN  naloxone, 0.04 mg, Intravenous, Q1MIN PRN  naloxone, 0.04 mg, Intravenous, Q3 min PRN  ondansetron, 4 mg, Intravenous, Q6H PRN  oxyCODONE, 10 mg, Oral, Q6H PRN        PRN Medications 01/10 01/11 01/12   diphenhydrAMINE (BENADRYL) injection 25 mg  Dose: 25 mg  Freq: Every 6 hours PRN Route: IV  PRN Reason: itching  Start: 01/09/24 1246   Admin Instructions:        0438         HYDROmorphone (DILAUDID) injection 0.5 mg  Dose: 0.5 mg  Freq: Every 6 hours PRN Route: IV  PRN Reason: breakthrough pain  PRN Comment: Breakthrough pain  Start: 01/11/24 1513   Admin Instructions:        2053         ketorolac (TORADOL) injection 15 mg  Dose: 15 mg  Freq: Every 6 hours PRN Route: IV  PRN Reason: severe pain  Start: 01/11/24 1511 End: 01/13/24 0604   Admin Instructions:         0102        metoclopramide (REGLAN) injection 5 mg  Dose: 5 mg  Freq: Every 6 hours PRN Route: IV  PRN Reasons: nausea,vomiting  Start: 01/09/24 1246   Admin Instructions:         0040        ondansetron (ZOFRAN) injection 4 mg  Dose: 4 mg  Freq: Every 6 hours PRN Route: IV  PRN Reasons: nausea,vomiting  Start: 01/07/24 1428   Admin Instructions:         0358            Discharge Plan: D        Network Utilization Review Department  ATTENTION: Please call with any questions or concerns to [phone] and carefully listen to the prompts so that you are directed to the right person. All voicemails are confidential.   For Discharge needs, contact Care Management DC Support Team at [phone] opt. 2  Send all requests for admission clinical reviews, approved or denied determinations and any other requests to dedicated fax number below belonging to the campus where the patient is receiving treatment. List of dedicated  fax numbers for the Facilities:  FACILITY NAME UR FAX NUMBER   ADMISSION DENIALS (Administrative/Medical Necessity) [phone]   DISCHARGE SUPPORT TEAM (NETWORK) [phone]   PARENT CHILD HEALTH (Maternity/NICU/Pediatrics) [phone]   [facility] [phone]   [facility] [phone]   [facility] [phone]   [facility] [phone]   [facility] [phone]   [facility] [phone]   [facility] [phone]   [facility] [phone]   [facility] [phone]   [facility] [phone]   [facility] [phone]

## 2024-01-12 NOTE — ASSESSMENT & PLAN NOTE
Pt stated he drinks liquor and beer multiple times a week. Unable to specify quantity.  Pt also noted occasional marijuana and coke use   UDS: (+) Opiates and Cocaine  It appears pt did receive Oral Oxy and IV dilaudid prior to drug screen  CIWA negative x72 hours-D/C protocol  Continue supportive care

## 2024-01-12 NOTE — QUICK NOTE
Notified by RN that patient had an episode of bilious emesis.  NG tube was removed this morning, clears restarted this morning which patient has been tolerating during the day.  Denies any increasing abdominal pain or distention.  On exam, abdomen is soft.  There is output in JOE drain, colostomy and Hannon.  Will give a dose of Reglan.  Patient reports having a carbonated drink last which is probably what caused the emesis so he will refrain from this in the meantime.

## 2024-01-12 NOTE — PROGRESS NOTES
[facility]  Progress Note  Name: Davis Goss I  MRN: [id number]  Unit/Bed#: MS 333[facility]to I Date of Admission: 1/7/2024   Date of Service: 1/12/2024 I Hospital Day: 5    Assessment/Plan   * Colonic mass  Assessment & Plan  Patient presented to the ED on 1/6 for complaints of ongoing constipation accompanied by weight loss of there past year. Patient stating at the end of December he had loss of appetite due to nausea and vomiting following each meal. Workup identified colonic mass. Pt left AMA but returned on 1/7 for further evaluation.   CT A/P: Partial colonic obstruction 2/2 7 cm long apple core lesion of mid sigmoid colon suspicious for colonic malignancy.  CEA, CT chest: WNL  POD #2 Ex lap sigmoid colectomy with Jo's procedure and repair of bladder perforation  S/p Pelvic JOE drain removed (1/11). C/w Bladder JOE drain.   With abnormal EKG on admission, however Echo was normal  General surgery/ GI following,  Pt with N/V overnight x2. NGT re-inserted and patient made NPO  Continue Supportive care  Pain management, antiemetics  Pain will require outpatient follow up with Urology for further management of bladder perforation  Urinary catheter to remain upon discharge    Elevated blood pressure reading  Assessment & Plan  Pt with notable HTN on admission, Initially believed secondary to abdominal pain with nausea and vomiting but BP now persistently elevated.   No prior history of HTN  Monitor While admitted  Continue pain management    Substance use  Assessment & Plan  Pt stated he drinks liquor and beer multiple times a week. Unable to specify quantity.  Pt also noted occasional marijuana and coke use   UDS: (+) Opiates and Cocaine  It appears pt did receive Oral Oxy and IV dilaudid prior to drug screen  CIWA negative x72 hours-D/C protocol  Continue supportive care    Smoking  Assessment & Plan  PT smokes 1/2 PPD  Per pt, he does not need NRT because he only smokes because its  there  NRT while admitted  Educated on smoking cessation               VTE Pharmacologic Prophylaxis: VTE Score: 3 Moderate Risk (Score 3-4) - Pharmacological DVT Prophylaxis Ordered: enoxaparin (Lovenox).    Mobility:   Basic Mobility Inpatient Raw Score: 20  JH-HLM Goal: 6: Walk 10 steps or more  JH-HLM Achieved: 6: Walk 10 steps or more  HLM Goal achieved. Continue to encourage appropriate mobility.    Patient Centered Rounds: I performed bedside rounds with nursing staff today.   Discussions with Specialists or Other Care Team Provider: Gen Sx    Education and Discussions with Family / Patient: Updated  (mother) at bedside.    Total Time Spent on Date of Encounter in care of patient: 60 mins. This time was spent on one or more of the following: performing physical exam; counseling and coordination of care; obtaining or reviewing history; documenting in the medical record; reviewing/ordering tests, medications or procedures; communicating with other healthcare professionals and discussing with patient's family/caregivers.    Current Length of Stay: 5 day(s)  Current Patient Status: Inpatient   Certification Statement: The patient will continue to require additional inpatient hospital stay due to colonic mass/obstruction s/p ex lap requiring IVF fluids and NGT and Surgery consultation/management  Discharge Plan: Anticipate discharge in >72 hrs to home with home services.    Code Status: Level 1 - Full Code    Subjective:   Patient stating he is feeling pretty lousy today. Complaints of nausea and vomiting. No current chest pain, shortness of breath or abdominal pain.    Objective:     Vitals:   Temp (24hrs), Av.4 °F (36.9 °C), Min:98 °F (36.7 °C), Max:98.7 °F (37.1 °C)    Temp:  [98 °F (36.7 °C)-98.7 °F (37.1 °C)] 98.4 °F (36.9 °C)  HR:  [] 100  Resp:  [16-20] 16  BP: (138-153)/() 147/100  SpO2:  [95 %-97 %] 95 %  Body mass index is 21.69 kg/m².     Input and Output Summary (last 24  hours):     Intake/Output Summary (Last 24 hours) at 1/12/2024 1642  Last data filed at 1/12/2024 1501  Gross per 24 hour   Intake 1000 ml   Output 6425 ml   Net -5425 ml       Physical Exam:   Physical Exam  Vitals and nursing note reviewed.   Constitutional:       General: He is not in acute distress.     Appearance: He is ill-appearing.   HENT:      Head: Normocephalic.   Cardiovascular:      Rate and Rhythm: Regular rhythm. Tachycardia present.      Pulses: Normal pulses.      Heart sounds: Normal heart sounds. No murmur heard.  Pulmonary:      Effort: Pulmonary effort is normal. No respiratory distress.      Breath sounds: Normal breath sounds.   Abdominal:      General: Bowel sounds are normal. There is no distension.      Palpations: Abdomen is soft.      Tenderness: There is no abdominal tenderness.      Comments: Mid Abd incision, CDI. Ostomy CDI. RLQ JOE CDI.   Musculoskeletal:      Cervical back: Normal range of motion.   Skin:     General: Skin is warm and dry.      Capillary Refill: Capillary refill takes less than 2 seconds.   Neurological:      Mental Status: He is alert and oriented to person, place, and time. Mental status is at baseline.      Motor: No weakness.   Psychiatric:         Mood and Affect: Mood is anxious.         Speech: Speech normal.         Behavior: Behavior is cooperative.          Additional Data:     Labs:  Results from last 7 days   Lab Units 01/12/24  0548   WBC Thousand/uL 16.35*   HEMOGLOBIN g/dL 13.2   HEMATOCRIT % 38.3   PLATELETS Thousands/uL 326   NEUTROS PCT % 89*   LYMPHS PCT % 4*   MONOS PCT % 5   EOS PCT % 1     Results from last 7 days   Lab Units 01/12/24  0548 01/11/24  1521 01/11/24  0437   SODIUM mmol/L 137   < > 135   POTASSIUM mmol/L 3.5   < > 5.6*   CHLORIDE mmol/L 95*   < > 100   CO2 mmol/L 31   < > 28   BUN mg/dL 13   < > 16   CREATININE mg/dL 0.82   < > 0.88   ANION GAP mmol/L 11   < > 7   CALCIUM mg/dL 9.1   < > 8.2*   ALBUMIN g/dL  --   --  2.9*   TOTAL  BILIRUBIN mg/dL  --   --  0.70   ALK PHOS U/L  --   --  50   ALT U/L  --   --  10   AST U/L  --   --  28   GLUCOSE RANDOM mg/dL 132   < > 64*    < > = values in this interval not displayed.     Results from last 7 days   Lab Units 01/08/24  0449   INR  1.35*     Results from last 7 days   Lab Units 01/09/24  1214   POC GLUCOSE mg/dl 134     Results from last 7 days   Lab Units 01/08/24  0449   HEMOGLOBIN A1C % 5.5     Results from last 7 days   Lab Units 01/06/24  1206   PROCALCITONIN ng/ml 0.08       Lines/Drains:  Invasive Devices       Peripheral Intravenous Line  Duration             Peripheral IV 01/09/24 Left;Upper;Ventral (anterior) Arm 3 days    Peripheral IV 01/10/24 Dorsal (posterior);Left Hand 1 day              Drain  Duration             Closed/Suction Drain Lateral;Right Abdomen Bulb 15 Fr. 3 days    Closed/Suction Drain Right Abdomen Bulb 15 Fr. 3 days    Colostomy LLQ 3 days    Urethral Catheter Latex 16 Fr. 3 days    NG/OG/Enteral Tube Nasogastric 16 Fr Right nare <1 day                  Urinary Catheter:  Goal for removal: N/A- Discharging with Hannon               Imaging: No pertinent imaging reviewed.    Recent Cultures (last 7 days):         Last 24 Hours Medication List:   Current Facility-Administered Medications   Medication Dose Route Frequency Provider Last Rate    acetaminophen  1,000 mg Intravenous Q6H [facility] Surekha Michelle PA-C 1,000 mg (01/12/24 1003)    acetaminophen  650 mg Oral Q6H PRN Sandra Alexander PA-C      cefazolin  1,000 mg Intravenous Q8H Surekha Michelle PA-C 1,000 mg (01/12/24 1134)    dextrose 5 % and sodium chloride 0.45 % with KCl 10 mEq/L  100 mL/hr Intravenous Continuous Sandra Alexander PA-C 100 mL/hr (01/12/24 0959)    diphenhydrAMINE  25 mg Intravenous Q6H PRN Surekha Michelle PA-C      enoxaparin  40 mg Subcutaneous Daily Surekha Michelle PA-C      hydrALAZINE  10 mg Intravenous Q6H PRN Shaun Sidhu DO      HYDROmorphone  0.5 mg Intravenous Q6H PRN Sandra  Shanell Alexander PA-C      ketorolac  15 mg Intravenous Q6H PRN Sandra Alexander PA-C      metoclopramide  5 mg Intravenous Q6H PRN Surekha Michelle PA-C      metroNIDAZOLE  500 mg Intravenous Q8H Surekha Michelle PA-C 500 mg (01/12/24 1235)    naloxone  0.04 mg Intravenous Q1MIN PRN Surekha Michelle PA-C      naloxone  0.04 mg Intravenous Q3 min PRN Surekha Michelle PA-C      nicotine  1 patch Transdermal Daily Surekha Michelle PA-C      ondansetron  4 mg Intravenous Q6H PRN Surekha Michelle PA-C      oxyCODONE  10 mg Oral Q6H PRN Sandra Alexander PA-C          Today, Patient Was Seen By: WES Peng    **Please Note: This note may have been constructed using a voice recognition system.**

## 2024-01-12 NOTE — QUICK NOTE
RN reports 1200cc bilious emesis. Zofran given. Exam unchanged from prior. Will re-insert NG tube and switch diet to NPO. NG orders placed per prior surgery orders.

## 2024-01-12 NOTE — NURSING NOTE
Pt had a bilious vomit . IV Reglan  5mg given stat  S/B Provider no new orders given Pt sitting in recliner ,pt c/o abd pain IV Toradol 15 mg given for relief the patient resting at present.

## 2024-01-12 NOTE — PROGRESS NOTES
"Progress Note - General Surgery   Davis Goss 53 y.o. male MRN: [id number]  Unit/Bed#: -01 Encounter: [id number]    Assessment/Plan    52yo M presenting with LBO now POD#3 s/p ex lap, sigmoid colectomy, Hartmans procedure, repair of bladder perforation   Pt with vomiting overnight, NGT replaced, nausea now resolved  XR reviewed, NGT in acceptable position  Abdomen soft, non-distended, non-tender, hypoactive bowel sounds, Mepelex in place over midline incision, JOE RLQ in place SS output  BP elevated, tachycardia 100s, remainder of VSS, afebrile  WBC 16.35 (15.64)  UO 1.8L, Hannon in place  NGT output 800cc recorded, 1000cc in canister at time of re-evaluation (0900)  2L emesis overnight  JOE 100cc     Continue NGT/NPO  Increase IVF to 100cc/hr  Continue JOE  PRN analgesics/antiemetics  DVT ppx  Ambulate, IS  Continue Hannon, plan to discharge with urology f/u for TOV  D/W attending      BP (!) 148/105 (BP Location: Right arm)   Pulse 103   Temp 98.7 °F (37.1 °C) (Oral)   Resp 19   Ht 5' 11\" (1.803 m)   Wt 70.5 kg (155 lb 8 oz)   SpO2 97%   BMI 21.69 kg/m²     Labs in chart were reviewed.  Results from last 7 days   Lab Units 01/12/24  0548   WBC Thousand/uL 16.35*   HEMOGLOBIN g/dL 13.2   HEMATOCRIT % 38.3   PLATELETS Thousands/uL 326     Results from last 7 days   Lab Units 01/12/24  0548   POTASSIUM mmol/L 3.5   CHLORIDE mmol/L 95*   CO2 mmol/L 31   BUN mg/dL 13   CREATININE mg/dL 0.82           Intake/Output Summary (Last 24 hours) at 1/12/2024 0742  Last data filed at 1/12/2024 0601  Gross per 24 hour   Intake 1663.3 ml   Output 5575 ml   Net -3911.7 ml           Subjective/Objective     Subjective: Pt seen and examined. He reports that he felt like he had phlegm in his throat last night and when he tried to expectorate he began to vomit. Nausea has now subsided. Ostomy functional. No new abdominal pain.     Review of Systems   Constitutional:  Negative for chills and fever.   Respiratory:  Negative " for shortness of breath.    Cardiovascular:  Negative for chest pain.   Gastrointestinal:  Negative for abdominal pain, nausea and vomiting.   Genitourinary:  Negative for difficulty urinating and dysuria.          Objective:     Physical Exam  Vitals and nursing note reviewed.   Constitutional:       General: He is not in acute distress.     Appearance: He is not toxic-appearing.   HENT:      Head: Normocephalic and atraumatic.   Eyes:      Extraocular Movements: Extraocular movements intact.   Pulmonary:      Effort: Pulmonary effort is normal. No respiratory distress.   Abdominal:      General: Bowel sounds are decreased. There is no distension.      Palpations: Abdomen is soft.      Tenderness: There is no abdominal tenderness. There is no guarding.      Comments: Mepelex dressing in place CDI over midline incision. JOE RLQ (bladder) in place with SS output.    Musculoskeletal:      Cervical back: Normal range of motion.   Skin:     General: Skin is warm and dry.   Neurological:      Mental Status: He is alert and oriented to person, place, and time.   Psychiatric:         Mood and Affect: Mood normal.         Behavior: Behavior normal.         Thought Content: Thought content normal.            Sandra Alexander PA-C  1/12/2024

## 2024-01-12 NOTE — ASSESSMENT & PLAN NOTE
Patient presented to the ED on 1/6 for complaints of ongoing constipation accompanied by weight loss of there past year. Patient stating at the end of December he had loss of appetite due to nausea and vomiting following each meal. Workup identified colonic mass. Pt left AMA but returned on 1/7 for further evaluation.   CT A/P: Partial colonic obstruction 2/2 7 cm long apple core lesion of mid sigmoid colon suspicious for colonic malignancy.  CEA, CT chest: WNL  POD #2 Ex lap sigmoid colectomy with Jo's procedure and repair of bladder perforation  S/p Pelvic JOE drain removed (1/11). C/w Bladder JOE drain.   With abnormal EKG on admission, however Echo was normal  General surgery/ GI following,  Pt with N/V overnight x2. NGT re-inserted and patient made NPO  Continue Supportive care  Pain management, antiemetics  Pain will require outpatient follow up with Urology for further management of bladder perforation  Urinary catheter to remain upon discharge

## 2024-01-12 NOTE — TELEPHONE ENCOUNTER
Patient remains admitted at this time. Will need to monitor for d/c and then call and have patient cystogram in 2 weeks followed by an AP visit to establish care and have TOV

## 2024-01-13 PROBLEM — E43 SEVERE PROTEIN-CALORIE MALNUTRITION (HCC): Status: ACTIVE | Noted: 2024-01-13

## 2024-01-13 PROBLEM — E87.6 HYPOKALEMIA: Status: ACTIVE | Noted: 2024-01-13

## 2024-01-13 LAB
ALBUMIN SERPL BCP-MCNC: 3 G/DL (ref 3.5–5)
ALP SERPL-CCNC: 51 U/L (ref 34–104)
ALT SERPL W P-5'-P-CCNC: 7 U/L (ref 7–52)
ANION GAP SERPL CALCULATED.3IONS-SCNC: 8 MMOL/L
AST SERPL W P-5'-P-CCNC: 12 U/L (ref 13–39)
BASOPHILS # BLD AUTO: 0.05 THOUSANDS/ÂΜL (ref 0–0.1)
BASOPHILS NFR BLD AUTO: 0 % (ref 0–1)
BILIRUB SERPL-MCNC: 0.61 MG/DL (ref 0.2–1)
BUN SERPL-MCNC: 17 MG/DL (ref 5–25)
CALCIUM ALBUM COR SERPL-MCNC: 9.5 MG/DL (ref 8.3–10.1)
CALCIUM SERPL-MCNC: 8.7 MG/DL (ref 8.4–10.2)
CHLORIDE SERPL-SCNC: 90 MMOL/L (ref 96–108)
CO2 SERPL-SCNC: 45 MMOL/L (ref 21–32)
CREAT SERPL-MCNC: 1.03 MG/DL (ref 0.6–1.3)
EOSINOPHIL # BLD AUTO: 0.32 THOUSAND/ÂΜL (ref 0–0.61)
EOSINOPHIL NFR BLD AUTO: 3 % (ref 0–6)
ERYTHROCYTE [DISTWIDTH] IN BLOOD BY AUTOMATED COUNT: 14.7 % (ref 11.6–15.1)
GFR SERPL CREATININE-BSD FRML MDRD: 83 ML/MIN/1.73SQ M
GLUCOSE SERPL-MCNC: 115 MG/DL (ref 65–140)
GLUCOSE SERPL-MCNC: 144 MG/DL (ref 65–140)
HCT VFR BLD AUTO: 38.6 % (ref 36.5–49.3)
HGB BLD-MCNC: 13.3 G/DL (ref 12–17)
IMM GRANULOCYTES # BLD AUTO: 0.05 THOUSAND/UL (ref 0–0.2)
IMM GRANULOCYTES NFR BLD AUTO: 0 % (ref 0–2)
LYMPHOCYTES # BLD AUTO: 0.87 THOUSANDS/ÂΜL (ref 0.6–4.47)
LYMPHOCYTES NFR BLD AUTO: 7 % (ref 14–44)
MAGNESIUM SERPL-MCNC: 2.2 MG/DL (ref 1.9–2.7)
MCH RBC QN AUTO: 28.7 PG (ref 26.8–34.3)
MCHC RBC AUTO-ENTMCNC: 34.5 G/DL (ref 31.4–37.4)
MCV RBC AUTO: 83 FL (ref 82–98)
MONOCYTES # BLD AUTO: 1 THOUSAND/ÂΜL (ref 0.17–1.22)
MONOCYTES NFR BLD AUTO: 8 % (ref 4–12)
NEUTROPHILS # BLD AUTO: 10.26 THOUSANDS/ÂΜL (ref 1.85–7.62)
NEUTS SEG NFR BLD AUTO: 82 % (ref 43–75)
NRBC BLD AUTO-RTO: 0 /100 WBCS
PLATELET # BLD AUTO: 358 THOUSANDS/UL (ref 149–390)
PMV BLD AUTO: 10.9 FL (ref 8.9–12.7)
POTASSIUM SERPL-SCNC: 3 MMOL/L (ref 3.5–5.3)
PROT SERPL-MCNC: 6.5 G/DL (ref 6.4–8.4)
RBC # BLD AUTO: 4.64 MILLION/UL (ref 3.88–5.62)
SODIUM SERPL-SCNC: 143 MMOL/L (ref 135–147)
WBC # BLD AUTO: 12.55 THOUSAND/UL (ref 4.31–10.16)

## 2024-01-13 PROCEDURE — 80053 COMPREHEN METABOLIC PANEL: CPT | Performed by: PHYSICIAN ASSISTANT

## 2024-01-13 PROCEDURE — 99024 POSTOP FOLLOW-UP VISIT: CPT | Performed by: SURGERY

## 2024-01-13 PROCEDURE — 82948 REAGENT STRIP/BLOOD GLUCOSE: CPT

## 2024-01-13 PROCEDURE — 99232 SBSQ HOSP IP/OBS MODERATE 35: CPT | Performed by: PHYSICIAN ASSISTANT

## 2024-01-13 PROCEDURE — 85025 COMPLETE CBC W/AUTO DIFF WBC: CPT | Performed by: PHYSICIAN ASSISTANT

## 2024-01-13 PROCEDURE — 83735 ASSAY OF MAGNESIUM: CPT | Performed by: PHYSICIAN ASSISTANT

## 2024-01-13 RX ORDER — SODIUM CHLORIDE, SODIUM LACTATE, POTASSIUM CHLORIDE, CALCIUM CHLORIDE 600; 310; 30; 20 MG/100ML; MG/100ML; MG/100ML; MG/100ML
100 INJECTION, SOLUTION INTRAVENOUS CONTINUOUS
Status: DISCONTINUED | OUTPATIENT
Start: 2024-01-13 | End: 2024-01-14

## 2024-01-13 RX ORDER — POTASSIUM CHLORIDE 14.9 MG/ML
20 INJECTION INTRAVENOUS ONCE
Qty: 100 ML | Refills: 0 | Status: COMPLETED | OUTPATIENT
Start: 2024-01-13 | End: 2024-01-13

## 2024-01-13 RX ADMIN — CEFAZOLIN SODIUM 1000 MG: 1 SOLUTION INTRAVENOUS at 10:28

## 2024-01-13 RX ADMIN — ACETAMINOPHEN 1000 MG: 10 INJECTION INTRAVENOUS at 11:13

## 2024-01-13 RX ADMIN — DEXTROSE, SODIUM CHLORIDE, AND POTASSIUM CHLORIDE 100 ML/HR: 5; .45; .075 INJECTION INTRAVENOUS at 03:36

## 2024-01-13 RX ADMIN — ENOXAPARIN SODIUM 40 MG: 40 INJECTION SUBCUTANEOUS at 08:24

## 2024-01-13 RX ADMIN — METRONIDAZOLE 500 MG: 500 INJECTION, SOLUTION INTRAVENOUS at 03:35

## 2024-01-13 RX ADMIN — POTASSIUM CHLORIDE 20 MEQ: 14.9 INJECTION, SOLUTION INTRAVENOUS at 10:22

## 2024-01-13 RX ADMIN — ACETAMINOPHEN 1000 MG: 10 INJECTION INTRAVENOUS at 16:14

## 2024-01-13 RX ADMIN — METRONIDAZOLE 500 MG: 500 INJECTION, SOLUTION INTRAVENOUS at 20:56

## 2024-01-13 RX ADMIN — POTASSIUM CHLORIDE 20 MEQ: 14.9 INJECTION, SOLUTION INTRAVENOUS at 08:24

## 2024-01-13 RX ADMIN — CEFAZOLIN SODIUM 1000 MG: 1 SOLUTION INTRAVENOUS at 17:48

## 2024-01-13 RX ADMIN — ACETAMINOPHEN 1000 MG: 10 INJECTION INTRAVENOUS at 03:35

## 2024-01-13 RX ADMIN — SODIUM CHLORIDE, SODIUM LACTATE, POTASSIUM CHLORIDE, AND CALCIUM CHLORIDE 100 ML/HR: .6; .31; .03; .02 INJECTION, SOLUTION INTRAVENOUS at 16:15

## 2024-01-13 RX ADMIN — ACETAMINOPHEN 1000 MG: 10 INJECTION INTRAVENOUS at 21:45

## 2024-01-13 RX ADMIN — CEFAZOLIN SODIUM 1000 MG: 1 SOLUTION INTRAVENOUS at 03:35

## 2024-01-13 RX ADMIN — METRONIDAZOLE 500 MG: 500 INJECTION, SOLUTION INTRAVENOUS at 11:13

## 2024-01-13 RX ADMIN — HYDROMORPHONE HYDROCHLORIDE 0.5 MG: 1 INJECTION, SOLUTION INTRAMUSCULAR; INTRAVENOUS; SUBCUTANEOUS at 17:15

## 2024-01-13 RX ADMIN — KETOROLAC TROMETHAMINE 15 MG: 30 INJECTION, SOLUTION INTRAMUSCULAR at 00:02

## 2024-01-13 NOTE — ASSESSMENT & PLAN NOTE
Patient reports drinking liquor and beer multiple times a week, unable to specify quantity  Patient admits to occasional marijuana and cocaine use   UDS: (+) Opiates and Cocaine. Patient received IV Dilaudid and PO oxycodone inpatient prior to collection.  CIWA negative x 72 hours - D/C protocol

## 2024-01-13 NOTE — PLAN OF CARE
Problem: PAIN - ADULT  Goal: Verbalizes/displays adequate comfort level or baseline comfort level  Description: Interventions:  - Encourage patient to monitor pain and request assistance  - Assess pain using appropriate pain scale  - Administer analgesics based on type and severity of pain and evaluate response  - Implement non-pharmacological measures as appropriate and evaluate response  - Consider cultural and social influences on pain and pain management  - Notify physician/advanced practitioner if interventions unsuccessful or patient reports new pain  Outcome: Progressing     Problem: DISCHARGE PLANNING  Goal: Discharge to home or other facility with appropriate resources  Description: INTERVENTIONS:  - Identify barriers to discharge w/patient and caregiver  - Arrange for needed discharge resources and transportation as appropriate  - Identify discharge learning needs (meds, wound care, etc.)  - Arrange for interpretive services to assist at discharge as needed  - Refer to Case Management Department for coordinating discharge planning if the patient needs post-hospital services based on physician/advanced practitioner order or complex needs related to functional status, cognitive ability, or social support system  Outcome: Progressing     Problem: Knowledge Deficit  Goal: Patient/family/caregiver demonstrates understanding of disease process, treatment plan, medications, and discharge instructions  Description: Complete learning assessment and assess knowledge base.  Interventions:  - Provide teaching at level of understanding  - Provide teaching via preferred learning methods  Outcome: Progressing     Problem: GASTROINTESTINAL - ADULT  Goal: Minimal or absence of nausea and/or vomiting  Description: INTERVENTIONS:  - Administer IV fluids if ordered to ensure adequate hydration  - Maintain NPO status until nausea and vomiting are resolved  - Nasogastric tube if ordered  - Administer ordered antiemetic  medications as needed  - Provide nonpharmacologic comfort measures as appropriate  - Advance diet as tolerated, if ordered  - Consider nutrition services referral to assist patient with adequate nutrition and appropriate food choices  Outcome: Progressing

## 2024-01-13 NOTE — ASSESSMENT & PLAN NOTE
Malnutrition Findings:   Adult Malnutrition type: Acute illness  Adult Degree of Malnutrition: Other severe protein calorie malnutrition  Malnutrition Characteristics: Muscle loss, Inadequate energy              360 Statement: Moderate acute malnutrition r/t inadequate energy intake with colonic mass as evidenced by severe muscle loss (temples), intake < 50% of estimated needs x > 5 days.  Will treat with nutrition therapy and PO diet/supplement recommendations upon diet advancement.  BMI Findings:        Body mass index is 20.61 kg/m².   Nutrition following

## 2024-01-13 NOTE — PROGRESS NOTES
Formerly [facility], later [facility]  Progress Note  Name: Davis Goss I  MRN: [id number]  Unit/Bed#: -01 I Date of Admission: 1/7/2024   Date of Service: 1/13/2024 I Hospital Day: 6    Assessment/Plan   * Colonic mass  Assessment & Plan  Presented with ongoing constipation, weight loss over the past year. Associated loss of appetite, nausea and vomiting following meals since end of December. Initially seen in ED 1/6, left AMA but returned on 1/7 for further evaluation.   CT A/P: Partial colonic obstruction 2/2 7 cm long apple core lesion of mid sigmoid colon suspicious for colonic malignancy.  CEA wnl, CT chest no evidence of metastatic disease   S/p ex lap, sigmoid colectomy with Jo's procedure and repair of bladder perforation (1/9)  S/p pelvic JOE drain removal (1/11), maintaining bladder JOE drain  Recurrent nausea/vomiting overnight (1/11-1/12), NGT replaced  General surgery following, continue IV Ancef/Flagyl & maintaining NGT and NPO status until cleared for clamp trial  Continue IVFs, analgesics, antiemetics  Maintain Hannon catheter upon discharge with outpatient urology F/U    Hypokalemia  Assessment & Plan  K+ 3.0 today, likely due to poor oral intake while NPO  Repleted, monitor BMP in a.m.    Severe protein-calorie malnutrition (HCC)  Assessment & Plan  Malnutrition Findings:   Adult Malnutrition type: Acute illness  Adult Degree of Malnutrition: Other severe protein calorie malnutrition  Malnutrition Characteristics: Muscle loss, Inadequate energy              360 Statement: Moderate acute malnutrition r/t inadequate energy intake with colonic mass as evidenced by severe muscle loss (temples), intake < 50% of estimated needs x > 5 days.  Will treat with nutrition therapy and PO diet/supplement recommendations upon diet advancement.  BMI Findings:        Body mass index is 20.61 kg/m².   Nutrition following    Elevated blood pressure reading  Assessment & Plan  Hypertensive on admission,  initially thought to be 2/2 abdominal pain but BP remained persistently elevated while inpatient  No prior history of HTN per chart review  With abnormal EKG on admission, however Echo was normal  BP reviewed, improving without need for antihypertensives  Monitor BP per unit protocol    Substance use  Assessment & Plan  Patient reports drinking liquor and beer multiple times a week, unable to specify quantity  Patient admits to occasional marijuana and cocaine use   UDS: (+) Opiates and Cocaine. Patient received IV Dilaudid and PO oxycodone inpatient prior to collection.  CIWA negative x 72 hours - D/C protocol    Smoking  Assessment & Plan  Smokes 1/2 PPD  NRT while admitted  Educated on smoking cessation               VTE Pharmacologic Prophylaxis: VTE Score: 3 Moderate Risk (Score 3-4) - Pharmacological DVT Prophylaxis Ordered: enoxaparin (Lovenox).    Mobility:   Basic Mobility Inpatient Raw Score: 15  -HLM Goal: 4: Move to chair/commode  JH-HLM Achieved: 2: Bed activities/Dependent transfer  HLM Goal NOT achieved. Continue with multidisciplinary rounding and encourage appropriate mobility to improve upon HLM goals.    Patient Centered Rounds: I performed bedside rounds with nursing staff today.   Discussions with Specialists or Other Care Team Provider: None    Education and Discussions with Family / Patient: Patient declined call to .     Total Time Spent on Date of Encounter in care of patient: 35 mins. This time was spent on one or more of the following: performing physical exam; counseling and coordination of care; obtaining or reviewing history; documenting in the medical record; reviewing/ordering tests, medications or procedures; communicating with other healthcare professionals and discussing with patient's family/caregivers.    Current Length of Stay: 6 day(s)  Current Patient Status: Inpatient   Certification Statement: The patient will continue to require additional inpatient hospital  stay due to IV antibiotics, NGT, IVFs  Discharge Plan: Anticipate discharge in >72 hrs to home with home services.    Code Status: Level 1 - Full Code    Subjective:   Patient is seen at bedside this a.m., denies any nausea, vomiting or abdominal pain, notes that he is nervous if they NGT and have to replace it a second time.    Objective:     Vitals:   Temp (24hrs), Av.1 °F (36.7 °C), Min:97.6 °F (36.4 °C), Max:98.4 °F (36.9 °C)    Temp:  [97.6 °F (36.4 °C)-98.4 °F (36.9 °C)] 98 °F (36.7 °C)  HR:  [] 108  Resp:  [16-18] 16  BP: (125-147)/() 125/83  SpO2:  [94 %-97 %] 96 %  Body mass index is 20.61 kg/m².     Input and Output Summary (last 24 hours):     Intake/Output Summary (Last 24 hours) at 2024 1316  Last data filed at 2024 0601  Gross per 24 hour   Intake --   Output 3510 ml   Net -3510 ml       Physical Exam:   Physical Exam  Constitutional:       General: He is not in acute distress.     Appearance: He is ill-appearing. He is not toxic-appearing or diaphoretic.   HENT:      Nose:      Comments: NGT in place  Cardiovascular:      Rate and Rhythm: Normal rate and regular rhythm.      Pulses: Normal pulses.      Heart sounds: Normal heart sounds.   Pulmonary:      Effort: Pulmonary effort is normal. No respiratory distress.      Breath sounds: Normal breath sounds.   Abdominal:      General: Bowel sounds are normal. There is no distension.      Palpations: Abdomen is soft.      Tenderness: There is no abdominal tenderness.      Comments: Colostomy in place, significant amount of soft brown stool.    Musculoskeletal:         General: No swelling or tenderness.   Skin:     General: Skin is warm.   Neurological:      General: No focal deficit present.      Mental Status: He is alert.   Psychiatric:         Mood and Affect: Mood normal.         Behavior: Behavior normal.          Additional Data:     Labs:  Results from last 7 days   Lab Units 24  0426   WBC Thousand/uL 12.55*    HEMOGLOBIN g/dL 13.3   HEMATOCRIT % 38.6   PLATELETS Thousands/uL 358   NEUTROS PCT % 82*   LYMPHS PCT % 7*   MONOS PCT % 8   EOS PCT % 3     Results from last 7 days   Lab Units 01/13/24  0426   SODIUM mmol/L 143   POTASSIUM mmol/L 3.0*   CHLORIDE mmol/L 90*   CO2 mmol/L 45*   BUN mg/dL 17   CREATININE mg/dL 1.03   ANION GAP mmol/L 8   CALCIUM mg/dL 8.7   ALBUMIN g/dL 3.0*   TOTAL BILIRUBIN mg/dL 0.61   ALK PHOS U/L 51   ALT U/L 7   AST U/L 12*   GLUCOSE RANDOM mg/dL 115     Results from last 7 days   Lab Units 01/08/24  0449   INR  1.35*     Results from last 7 days   Lab Units 01/13/24  0717 01/09/24  1214   POC GLUCOSE mg/dl 144* 134     Results from last 7 days   Lab Units 01/08/24  0449   HEMOGLOBIN A1C % 5.5             Lines/Drains:  Invasive Devices       Peripheral Intravenous Line  Duration             Peripheral IV 01/09/24 Left;Upper;Ventral (anterior) Arm 3 days    Peripheral IV 01/13/24 Right;Ventral (anterior) Forearm <1 day              Drain  Duration             Closed/Suction Drain Lateral;Right Abdomen Bulb 15 Fr. 4 days    Closed/Suction Drain Right Abdomen Bulb 15 Fr. 4 days    Colostomy LLQ 4 days    Urethral Catheter Latex 16 Fr. 4 days    NG/OG/Enteral Tube Nasogastric 16 Fr Right nare 1 day                  Urinary Catheter:  Goal for removal: N/A- Discharging with Hannon               Imaging: Reviewed radiology reports from this admission including: chest xray    Recent Cultures (last 7 days):         Last 24 Hours Medication List:   Current Facility-Administered Medications   Medication Dose Route Frequency Provider Last Rate    acetaminophen  1,000 mg Intravenous Q6H Pending sale to [facility] Surekha Michelle PA-C 1,000 mg (01/13/24 1113)    cefazolin  1,000 mg Intravenous Q8H SIMI Mishra-DELMAR 1,000 mg (01/13/24 1028)    diphenhydrAMINE  25 mg Intravenous Q6H PRN Surekha Michelle PA-C      enoxaparin  40 mg Subcutaneous Daily Surekha Michelle PA-C      hydrALAZINE  10 mg Intravenous Q6H PRN Shaun Sidhu DO       HYDROmorphone  0.5 mg Intravenous Q6H PRN Sandra Alexander PA-C      metoclopramide  5 mg Intravenous Q6H PRN Surekha Michelle PA-C      metroNIDAZOLE  500 mg Intravenous Q8H Surekha Michelle PA-C 500 mg (01/13/24 1113)    naloxone  0.04 mg Intravenous Q1MIN PRN Surekha Michelle PA-C      naloxone  0.04 mg Intravenous Q3 min PRN Surekha Michelle PA-C      nicotine  1 patch Transdermal Daily Surekha Michelle PA-C      normal saline with KCl 30 mEq/L and K Phos 30 mEq/L  150 mL/hr Intravenous Continuous Soraya Grace MD      ondansetron  4 mg Intravenous Q6H PRN Surekha Michelle PA-C      oxyCODONE  10 mg Oral Q6H PRN Sandra Alexander PA-C          Today, Patient Was Seen By: Marcia Li PA-C    **Please Note: This note may have been constructed using a voice recognition system.**

## 2024-01-13 NOTE — ASSESSMENT & PLAN NOTE
Presented with ongoing constipation, weight loss over the past year. Associated loss of appetite, nausea and vomiting following meals since end of December. Initially seen in ED 1/6, left AMA but returned on 1/7 for further evaluation.   CT A/P: Partial colonic obstruction 2/2 7 cm long apple core lesion of mid sigmoid colon suspicious for colonic malignancy.  CEA wnl, CT chest no evidence of metastatic disease   S/p ex lap, sigmoid colectomy with Jo's procedure and repair of bladder perforation (1/9)  S/p pelvic JOE drain removal (1/11), maintaining bladder JOE drain  Recurrent nausea/vomiting overnight (1/11-1/12), NGT replaced  General surgery following, continue IV Ancef/Flagyl & maintaining NGT and NPO status until cleared for clamp trial  Continue IVFs, analgesics, antiemetics  Maintain Hannon catheter upon discharge with outpatient urology F/U

## 2024-01-13 NOTE — ASSESSMENT & PLAN NOTE
Hypertensive on admission, initially thought to be 2/2 abdominal pain but BP remained persistently elevated while inpatient  No prior history of HTN per chart review  With abnormal EKG on admission, however Echo was normal  BP reviewed, improving without need for antihypertensives  Monitor BP per unit protocol

## 2024-01-13 NOTE — PROGRESS NOTES
"Progress Note - General Surgery   Davis Goss 53 y.o. 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  He has resting tachycardia    Blood pressure 125/83, pulse (!) 108, temperature 98 °F (36.7 °C), temperature source Tympanic, resp. rate 16, height 5' 11\" (1.803 m), weight 67 kg (147 lb 12.8 oz), SpO2 96%.,Body mass index is 20.61 kg/m².      Intake/Output Summary (Last 24 hours) at 1/13/2024 1303  Last data filed at 1/13/2024 0601  Gross per 24 hour   Intake --   Output 3510 ml   Net -3510 ml       Invasive Devices       Peripheral Intravenous Line  Duration             Peripheral IV 01/09/24 Left;Upper;Ventral (anterior) Arm 3 days    Peripheral IV 01/13/24 Right;Ventral (anterior) Forearm <1 day              Drain  Duration             Closed/Suction Drain Lateral;Right Abdomen Bulb 15 Fr. 4 days    Closed/Suction Drain Right Abdomen Bulb 15 Fr. 4 days    Colostomy LLQ 4 days    Urethral Catheter Latex 16 Fr. 4 days    NG/OG/Enteral Tube Nasogastric 16 Fr Right nare 1 day                    Physical Exam: Heart sounds are normal  Chest is clear to auscultation  Abdominal exam reveals audible bowel sounds  Dressing was removed and incision is clean  JOE drainage is serosanguineous  Colostomy bag is full of liquid stool    Hannon catheter is in place for bladder repair.  Urine output is clean    Lab, Imaging and other studies:I have personally reviewed pertinent lab results.    VTE Pharmacologic " Prophylaxis: Enoxaparin (Lovenox)  VTE Mechanical Prophylaxis: sequential compression device

## 2024-01-14 LAB
ANION GAP SERPL CALCULATED.3IONS-SCNC: 11 MMOL/L
BASOPHILS # BLD AUTO: 0.05 THOUSANDS/ÂΜL (ref 0–0.1)
BASOPHILS NFR BLD AUTO: 0 % (ref 0–1)
BUN SERPL-MCNC: 23 MG/DL (ref 5–25)
CALCIUM SERPL-MCNC: 8.6 MG/DL (ref 8.4–10.2)
CHLORIDE SERPL-SCNC: 88 MMOL/L (ref 96–108)
CO2 SERPL-SCNC: 45 MMOL/L (ref 21–32)
CREAT SERPL-MCNC: 1.22 MG/DL (ref 0.6–1.3)
EOSINOPHIL # BLD AUTO: 0.23 THOUSAND/ÂΜL (ref 0–0.61)
EOSINOPHIL NFR BLD AUTO: 2 % (ref 0–6)
ERYTHROCYTE [DISTWIDTH] IN BLOOD BY AUTOMATED COUNT: 14.8 % (ref 11.6–15.1)
GFR SERPL CREATININE-BSD FRML MDRD: 68 ML/MIN/1.73SQ M
GLUCOSE SERPL-MCNC: 105 MG/DL (ref 65–140)
HCT VFR BLD AUTO: 38.5 % (ref 36.5–49.3)
HGB BLD-MCNC: 12.8 G/DL (ref 12–17)
IMM GRANULOCYTES # BLD AUTO: 0.04 THOUSAND/UL (ref 0–0.2)
IMM GRANULOCYTES NFR BLD AUTO: 0 % (ref 0–2)
LYMPHOCYTES # BLD AUTO: 0.87 THOUSANDS/ÂΜL (ref 0.6–4.47)
LYMPHOCYTES NFR BLD AUTO: 7 % (ref 14–44)
MAGNESIUM SERPL-MCNC: 2.2 MG/DL (ref 1.9–2.7)
MCH RBC QN AUTO: 28.1 PG (ref 26.8–34.3)
MCHC RBC AUTO-ENTMCNC: 33.2 G/DL (ref 31.4–37.4)
MCV RBC AUTO: 85 FL (ref 82–98)
MONOCYTES # BLD AUTO: 1.63 THOUSAND/ÂΜL (ref 0.17–1.22)
MONOCYTES NFR BLD AUTO: 13 % (ref 4–12)
NEUTROPHILS # BLD AUTO: 9.93 THOUSANDS/ÂΜL (ref 1.85–7.62)
NEUTS SEG NFR BLD AUTO: 78 % (ref 43–75)
NRBC BLD AUTO-RTO: 0 /100 WBCS
PLATELET # BLD AUTO: 408 THOUSANDS/UL (ref 149–390)
PMV BLD AUTO: 11 FL (ref 8.9–12.7)
POTASSIUM SERPL-SCNC: 2.8 MMOL/L (ref 3.5–5.3)
RBC # BLD AUTO: 4.55 MILLION/UL (ref 3.88–5.62)
SODIUM SERPL-SCNC: 144 MMOL/L (ref 135–147)
WBC # BLD AUTO: 12.75 THOUSAND/UL (ref 4.31–10.16)

## 2024-01-14 PROCEDURE — 83735 ASSAY OF MAGNESIUM: CPT | Performed by: PHYSICIAN ASSISTANT

## 2024-01-14 PROCEDURE — 85025 COMPLETE CBC W/AUTO DIFF WBC: CPT | Performed by: PHYSICIAN ASSISTANT

## 2024-01-14 PROCEDURE — 99024 POSTOP FOLLOW-UP VISIT: CPT | Performed by: SURGERY

## 2024-01-14 PROCEDURE — 80048 BASIC METABOLIC PNL TOTAL CA: CPT | Performed by: PHYSICIAN ASSISTANT

## 2024-01-14 PROCEDURE — 99232 SBSQ HOSP IP/OBS MODERATE 35: CPT | Performed by: INTERNAL MEDICINE

## 2024-01-14 RX ORDER — POTASSIUM CHLORIDE 14.9 MG/ML
20 INJECTION INTRAVENOUS
Status: COMPLETED | OUTPATIENT
Start: 2024-01-14 | End: 2024-01-14

## 2024-01-14 RX ORDER — DEXTROSE MONOHYDRATE, SODIUM CHLORIDE, AND POTASSIUM CHLORIDE 50; 1.49; 9 G/1000ML; G/1000ML; G/1000ML
150 INJECTION, SOLUTION INTRAVENOUS CONTINUOUS
Status: DISCONTINUED | OUTPATIENT
Start: 2024-01-14 | End: 2024-01-16

## 2024-01-14 RX ORDER — SODIUM CHLORIDE, SODIUM LACTATE, POTASSIUM CHLORIDE, CALCIUM CHLORIDE 600; 310; 30; 20 MG/100ML; MG/100ML; MG/100ML; MG/100ML
150 INJECTION, SOLUTION INTRAVENOUS CONTINUOUS
Status: DISCONTINUED | OUTPATIENT
Start: 2024-01-14 | End: 2024-01-14

## 2024-01-14 RX ADMIN — POTASSIUM CHLORIDE 20 MEQ: 14.9 INJECTION, SOLUTION INTRAVENOUS at 10:33

## 2024-01-14 RX ADMIN — ACETAMINOPHEN 1000 MG: 10 INJECTION INTRAVENOUS at 12:21

## 2024-01-14 RX ADMIN — POTASSIUM CHLORIDE 20 MEQ: 14.9 INJECTION, SOLUTION INTRAVENOUS at 17:31

## 2024-01-14 RX ADMIN — CEFAZOLIN SODIUM 1000 MG: 1 SOLUTION INTRAVENOUS at 03:00

## 2024-01-14 RX ADMIN — SODIUM CHLORIDE, SODIUM LACTATE, POTASSIUM CHLORIDE, AND CALCIUM CHLORIDE 100 ML/HR: .6; .31; .03; .02 INJECTION, SOLUTION INTRAVENOUS at 05:33

## 2024-01-14 RX ADMIN — DEXTROSE, SODIUM CHLORIDE, AND POTASSIUM CHLORIDE 150 ML/HR: 5; .9; .15 INJECTION INTRAVENOUS at 10:32

## 2024-01-14 RX ADMIN — ACETAMINOPHEN 1000 MG: 10 INJECTION INTRAVENOUS at 04:24

## 2024-01-14 RX ADMIN — METRONIDAZOLE 500 MG: 500 INJECTION, SOLUTION INTRAVENOUS at 02:30

## 2024-01-14 RX ADMIN — CEFAZOLIN SODIUM 1000 MG: 1 SOLUTION INTRAVENOUS at 17:31

## 2024-01-14 RX ADMIN — POTASSIUM CHLORIDE 20 MEQ: 14.9 INJECTION, SOLUTION INTRAVENOUS at 14:48

## 2024-01-14 RX ADMIN — METRONIDAZOLE 500 MG: 500 INJECTION, SOLUTION INTRAVENOUS at 13:16

## 2024-01-14 RX ADMIN — ACETAMINOPHEN 1000 MG: 10 INJECTION INTRAVENOUS at 22:00

## 2024-01-14 RX ADMIN — METRONIDAZOLE 500 MG: 500 INJECTION, SOLUTION INTRAVENOUS at 20:30

## 2024-01-14 RX ADMIN — POTASSIUM CHLORIDE 20 MEQ: 14.9 INJECTION, SOLUTION INTRAVENOUS at 12:29

## 2024-01-14 RX ADMIN — ENOXAPARIN SODIUM 40 MG: 40 INJECTION SUBCUTANEOUS at 09:47

## 2024-01-14 RX ADMIN — HYDROMORPHONE HYDROCHLORIDE 0.5 MG: 1 INJECTION, SOLUTION INTRAMUSCULAR; INTRAVENOUS; SUBCUTANEOUS at 20:44

## 2024-01-14 RX ADMIN — CEFAZOLIN SODIUM 1000 MG: 1 SOLUTION INTRAVENOUS at 09:44

## 2024-01-14 RX ADMIN — ACETAMINOPHEN 1000 MG: 10 INJECTION INTRAVENOUS at 16:14

## 2024-01-14 RX ADMIN — DEXTROSE, SODIUM CHLORIDE, AND POTASSIUM CHLORIDE 150 ML/HR: 5; .9; .15 INJECTION INTRAVENOUS at 17:30

## 2024-01-14 NOTE — ASSESSMENT & PLAN NOTE
Hypokalemia in setting of GI issues and NPO state  K currently improved to 3.1, Mg currently 2.5  Patient is currently n.p.o. so will replace with IV and monitor

## 2024-01-14 NOTE — ASSESSMENT & PLAN NOTE
Malnutrition Findings:   Adult Malnutrition type: Acute illness  Adult Degree of Malnutrition: Other severe protein calorie malnutrition  Malnutrition Characteristics: Muscle loss, Inadequate energy      360 Statement: Moderate acute malnutrition r/t inadequate energy intake with colonic mass as evidenced by severe muscle loss (temples), intake < 50% of estimated needs x > 5 days.  Will treat with nutrition therapy and PO diet/supplement recommendations upon diet advancement.  BMI Findings:   Body mass index is 19.89 kg/m².   Nutrition following

## 2024-01-14 NOTE — ASSESSMENT & PLAN NOTE
Presented with ongoing constipation, with reported weight loss for at least a year, associated with loss of appetite, nausea and vomiting following meals since end of Dec '23. Initially seen in ED 1/6, left AMA but returned on 1/7 for further evaluation.   CT A/P: Partial colonic obstruction 2/2 7 cm long apple core lesion of mid sigmoid colon suspicious for colonic malignancy.  CEA wnl, CT chest no evidence of metastatic disease   S/p ex lap, sigmoid colectomy with Jo's procedure and repair of bladder perforation (1/9)  S/p pelvic JOE drain removal (1/11)  Recurrent nausea/vomiting overnight (1/11-1/12), NGT replaced  General surgery following,   Continue IV Ancef/Flagyl   Maintain NGT and NPO status until cleared for clamp trial  Continue IVFs, analgesics, antiemetics  Maintain Hannon catheter upon discharge with outpatient urology F/U due to perforation

## 2024-01-14 NOTE — PLAN OF CARE
Problem: PAIN - ADULT  Goal: Verbalizes/displays adequate comfort level or baseline comfort level  Description: Interventions:  - Encourage patient to monitor pain and request assistance  - Assess pain using appropriate pain scale  - Administer analgesics based on type and severity of pain and evaluate response  - Implement non-pharmacological measures as appropriate and evaluate response  - Consider cultural and social influences on pain and pain management  - Notify physician/advanced practitioner if interventions unsuccessful or patient reports new pain  Outcome: Progressing     Problem: DISCHARGE PLANNING  Goal: Discharge to home or other facility with appropriate resources  Description: INTERVENTIONS:  - Identify barriers to discharge w/patient and caregiver  - Arrange for needed discharge resources and transportation as appropriate  - Identify discharge learning needs (meds, wound care, etc.)  - Arrange for interpretive services to assist at discharge as needed  - Refer to Case Management Department for coordinating discharge planning if the patient needs post-hospital services based on physician/advanced practitioner order or complex needs related to functional status, cognitive ability, or social support system  Outcome: Progressing     Problem: Knowledge Deficit  Goal: Patient/family/caregiver demonstrates understanding of disease process, treatment plan, medications, and discharge instructions  Description: Complete learning assessment and assess knowledge base.  Interventions:  - Provide teaching at level of understanding  - Provide teaching via preferred learning methods  Outcome: Progressing     Problem: GASTROINTESTINAL - ADULT  Goal: Minimal or absence of nausea and/or vomiting  Description: INTERVENTIONS:  - Administer IV fluids if ordered to ensure adequate hydration  - Maintain NPO status until nausea and vomiting are resolved  - Nasogastric tube if ordered  - Administer ordered antiemetic  medications as needed  - Provide nonpharmacologic comfort measures as appropriate  - Advance diet as tolerated, if ordered  - Consider nutrition services referral to assist patient with adequate nutrition and appropriate food choices  Outcome: Progressing     Problem: Nutrition/Hydration-ADULT  Goal: Nutrient/Hydration intake appropriate for improving, restoring or maintaining nutritional needs  Description: Monitor and assess patient's nutrition/hydration status for malnutrition. Collaborate with interdisciplinary team and initiate plan and interventions as ordered.  Monitor patient's weight and dietary intake as ordered or per policy. Utilize nutrition screening tool and intervene as necessary. Determine patient's food preferences and provide high-protein, high-caloric foods as appropriate.     INTERVENTIONS:  - Monitor oral intake, urinary output, labs, and treatment plans  - Assess nutrition and hydration status and recommend course of action  - Evaluate amount of meals eaten  - Assist patient with eating if necessary   - Allow adequate time for meals  - Recommend/ encourage appropriate diets, oral nutritional supplements, and vitamin/mineral supplements  - Order, calculate, and assess calorie counts as needed  - Recommend, monitor, and adjust tube feedings and TPN/PPN based on assessed needs  - Assess need for intravenous fluids  - Provide specific nutrition/hydration education as appropriate  - Include patient/family/caregiver in decisions related to nutrition  Outcome: Progressing

## 2024-01-14 NOTE — PROGRESS NOTES
"Progress Note - General Surgery   Davis Goss 53 y.o. male MRN: [id number]  Unit/Bed#: -01 Encounter: [id number]    Assessment:  Postop day 5 status post Jo's procedure for locally advanced sigmoid colon carcinoma with a urinary bladder repair    High NG output probably due to severe electrolyte abnormalities.  Labs look worse today as lactated Ringer's was used as a replacement fluid yesterday.  Patient is in negative balance of 10 L since admission.    Plan:  Change IV fluids to correct electrolyte abnormalities  Maintain NG tube because of high output in the last 24 hours  Maintain Hannon catheter    Subjective/Objective   Chief Complaint: No complaints    Subjective: Patient feels hungry.  He denies any nausea or vomiting.    Objective: Patient has resting tachycardia    Blood pressure 122/77, pulse 103, temperature 97.8 °F (36.6 °C), temperature source Oral, resp. rate 16, height 5' 11\" (1.803 m), weight 64.7 kg (142 lb 10.2 oz), SpO2 95%.,Body mass index is 19.89 kg/m².      Intake/Output Summary (Last 24 hours) at 1/14/2024 1127  Last data filed at 1/14/2024 0533  Gross per 24 hour   Intake 1330 ml   Output 3870 ml   Net -2540 ml       Invasive Devices       Peripheral Intravenous Line  Duration             Peripheral IV 01/09/24 Left;Upper;Ventral (anterior) Arm 4 days    Peripheral IV 01/13/24 Right;Ventral (anterior) Forearm 1 day              Drain  Duration             Closed/Suction Drain Lateral;Right Abdomen Bulb 15 Fr. 5 days    Closed/Suction Drain Right Abdomen Bulb 15 Fr. 5 days    Colostomy LLQ 5 days    Urethral Catheter Latex 16 Fr. 5 days    NG/OG/Enteral Tube Nasogastric 16 Fr Right nare 2 days                    Physical Exam: Heart sounds are normal  Chest is clear to auscultation  Abdomen is flat with minimal bowel sounds.  Incision is clean.  Colostomy bag is full of stool.    Lab, Imaging and other studies:I have personally reviewed pertinent lab results.    VTE Pharmacologic " Prophylaxis: Enoxaparin (Lovenox)  VTE Mechanical Prophylaxis: sequential compression device

## 2024-01-14 NOTE — PROGRESS NOTES
[facility]  Progress Note  Name: Davis Goss I  MRN: [id number]  Unit/Bed#: -01 I Date of Admission: 1/7/2024   Date of Service: 1/14/2024 I Hospital Day: 7    Assessment/Plan   Hypokalemia  Assessment & Plan  Hypokalemia to potassium 2.8  Patient is currently n.p.o. so will replace with IV supplements only.      Severe protein-calorie malnutrition (HCC)  Assessment & Plan  Malnutrition Findings:   Adult Malnutrition type: Acute illness  Adult Degree of Malnutrition: Other severe protein calorie malnutrition  Malnutrition Characteristics: Muscle loss, Inadequate energy              360 Statement: Moderate acute malnutrition r/t inadequate energy intake with colonic mass as evidenced by severe muscle loss (temples), intake < 50% of estimated needs x > 5 days.  Will treat with nutrition therapy and PO diet/supplement recommendations upon diet advancement.  BMI Findings:        Body mass index is 19.89 kg/m².   Nutrition following    Elevated blood pressure reading  Assessment & Plan  Hypertensive on admission, initially thought to be 2/2 abdominal pain but BP remained persistently elevated while inpatient  No prior history of HTN per chart review  With abnormal EKG on admission, however Echo was normal  BP reviewed, improving without need for antihypertensives  Monitor BP per unit protocol    Substance use  Assessment & Plan  Patient reports drinking liquor and beer multiple times a week, unable to specify quantity  Patient admits to occasional marijuana and cocaine use   UDS: (+) Opiates and Cocaine. Patient received IV Dilaudid and PO oxycodone inpatient prior to collection.  CIWA negative x 72 hours - D/C protocol    Smoking  Assessment & Plan  Smokes 1/2 PPD  NRT while admitted  Educated on smoking cessation    * Colonic mass  Assessment & Plan  Presented with ongoing constipation, weight loss over the past year. Associated loss of appetite, nausea and vomiting following meals  since end of December. Initially seen in ED 1/6, left AMA but returned on 1/7 for further evaluation.   CT A/P: Partial colonic obstruction 2/2 7 cm long apple core lesion of mid sigmoid colon suspicious for colonic malignancy.  CEA wnl, CT chest no evidence of metastatic disease   S/p ex lap, sigmoid colectomy with Jo's procedure and repair of bladder perforation (1/9)  S/p pelvic JOE drain removal (1/11)  Recurrent nausea/vomiting overnight (1/11-1/12), NGT replaced  General surgery following, continue IV Ancef/Flagyl & maintaining NGT and NPO status until cleared for clamp trial  Continue IVFs, analgesics, antiemetics  Maintain Hannon catheter upon discharge with outpatient urology F/U               VTE Pharmacologic Prophylaxis: VTE Score: 3 Moderate Risk (Score 3-4) - Pharmacological DVT Prophylaxis Ordered: enoxaparin (Lovenox).    Mobility:   Basic Mobility Inpatient Raw Score: 22  JH-HLM Goal: 7: Walk 25 feet or more  JH-HLM Achieved: 2: Bed activities/Dependent transfer  HLM Goal NOT achieved. Continue with multidisciplinary rounding and encourage appropriate mobility to improve upon HLM goals.    Patient Centered Rounds: I performed bedside rounds with nursing staff today.   Discussions with Specialists or Other Care Team Provider: None    Education and Discussions with Family / Patient: Patient declined call to .     Total Time Spent on Date of Encounter in care of patient: 35 mins. This time was spent on one or more of the following: performing physical exam; counseling and coordination of care; obtaining or reviewing history; documenting in the medical record; reviewing/ordering tests, medications or procedures; communicating with other healthcare professionals and discussing with patient's family/caregivers.    Current Length of Stay: 7 day(s)  Current Patient Status: Inpatient   Certification Statement: The patient will continue to require additional inpatient hospital stay due to IV  antibiotics, NGT, IVFs  Discharge Plan: Anticipate discharge in >72 hrs to home with home services.    Code Status: Level 1 - Full Code    Subjective:   Continues to have high NG tube output.  Denies any other complaints.  Just has some abdominal pain worse with coughing but otherwise no issues.    Objective:     Vitals:   Temp (24hrs), Av.8 °F (36.6 °C), Min:97.6 °F (36.4 °C), Max:97.8 °F (36.6 °C)    Temp:  [97.6 °F (36.4 °C)-97.8 °F (36.6 °C)] 97.8 °F (36.6 °C)  HR:  [] 103  Resp:  [16-18] 16  BP: (118-125)/(68-87) 122/77  SpO2:  [94 %-95 %] 95 %  Body mass index is 19.89 kg/m².     Input and Output Summary (last 24 hours):     Intake/Output Summary (Last 24 hours) at 2024 1241  Last data filed at 2024 0533  Gross per 24 hour   Intake 1330 ml   Output 3870 ml   Net -2540 ml       Physical Exam:   Physical Exam  Constitutional:       General: He is not in acute distress.     Appearance: He is ill-appearing. He is not toxic-appearing or diaphoretic.   HENT:      Nose:      Comments: NGT in place  Cardiovascular:      Rate and Rhythm: Normal rate and regular rhythm.      Pulses: Normal pulses.      Heart sounds: Normal heart sounds.   Pulmonary:      Effort: Pulmonary effort is normal. No respiratory distress.      Breath sounds: Normal breath sounds.   Abdominal:      General: Bowel sounds are normal. There is no distension.      Palpations: Abdomen is soft.      Tenderness: There is no abdominal tenderness.      Comments: Colostomy in place, significant amount of soft brown stool.    Musculoskeletal:         General: No swelling or tenderness.   Skin:     General: Skin is warm.   Neurological:      General: No focal deficit present.      Mental Status: He is alert.   Psychiatric:         Mood and Affect: Mood normal.         Behavior: Behavior normal.          Additional Data:     Labs:  Results from last 7 days   Lab Units 24  0440   WBC Thousand/uL 12.75*   HEMOGLOBIN g/dL 12.8    HEMATOCRIT % 38.5   PLATELETS Thousands/uL 408*   NEUTROS PCT % 78*   LYMPHS PCT % 7*   MONOS PCT % 13*   EOS PCT % 2     Results from last 7 days   Lab Units 01/14/24  0440 01/13/24  0426   SODIUM mmol/L 144 143   POTASSIUM mmol/L 2.8* 3.0*   CHLORIDE mmol/L 88* 90*   CO2 mmol/L 45* 45*   BUN mg/dL 23 17   CREATININE mg/dL 1.22 1.03   ANION GAP mmol/L 11 8   CALCIUM mg/dL 8.6 8.7   ALBUMIN g/dL  --  3.0*   TOTAL BILIRUBIN mg/dL  --  0.61   ALK PHOS U/L  --  51   ALT U/L  --  7   AST U/L  --  12*   GLUCOSE RANDOM mg/dL 105 115     Results from last 7 days   Lab Units 01/08/24  0449   INR  1.35*     Results from last 7 days   Lab Units 01/13/24  0717 01/09/24  1214   POC GLUCOSE mg/dl 144* 134     Results from last 7 days   Lab Units 01/08/24  0449   HEMOGLOBIN A1C % 5.5             Lines/Drains:  Invasive Devices       Peripheral Intravenous Line  Duration             Peripheral IV 01/09/24 Left;Upper;Ventral (anterior) Arm 4 days    Peripheral IV 01/13/24 Right;Ventral (anterior) Forearm 1 day              Drain  Duration             Closed/Suction Drain Lateral;Right Abdomen Bulb 15 Fr. 5 days    Closed/Suction Drain Right Abdomen Bulb 15 Fr. 5 days    Colostomy LLQ 5 days    Urethral Catheter Latex 16 Fr. 5 days    NG/OG/Enteral Tube Nasogastric 16 Fr Right nare 2 days                  Urinary Catheter:  Goal for removal: N/A- Discharging with Hannon               Imaging: Reviewed radiology reports from this admission including: chest xray    Recent Cultures (last 7 days):         Last 24 Hours Medication List:   Current Facility-Administered Medications   Medication Dose Route Frequency Provider Last Rate    acetaminophen  1,000 mg Intravenous Q6H [facility] Surekha Michelle PA-C 1,000 mg (01/14/24 1221)    cefazolin  1,000 mg Intravenous Q8H Surekha Michelle PA-C 1,000 mg (01/14/24 0944)    dextrose 5 % and sodium chloride 0.9 % with KCl 20 mEq/L  150 mL/hr Intravenous Continuous David Weinstein  mL/hr  (01/14/24 1032)    diphenhydrAMINE  25 mg Intravenous Q6H PRN Surekha Michelle PA-C      enoxaparin  40 mg Subcutaneous Daily Surekha Michelle PA-C      hydrALAZINE  10 mg Intravenous Q6H PRN Shaun Sidhu DO      HYDROmorphone  0.5 mg Intravenous Q6H PRN Sandra Alexander PA-C      metoclopramide  5 mg Intravenous Q6H PRN Surekha Michelle PA-C      metroNIDAZOLE  500 mg Intravenous Q8H AL MishraC 500 mg (01/14/24 0230)    naloxone  0.04 mg Intravenous Q1MIN PRN Surekha Michelle PA-C      naloxone  0.04 mg Intravenous Q3 min PRN Surekha Michelle PA-C      nicotine  1 patch Transdermal Daily Surekha Michelle PA-C      ondansetron  4 mg Intravenous Q6H PRN Surekha Michelle PA-C      oxyCODONE  10 mg Oral Q6H PRN Sandra Alexander PA-C      potassium chloride  20 mEq Intravenous Q2H While awake Adamaris Gomez MD 20 mEq (01/14/24 1229)    potassium phosphate  21 mmol Intravenous Once David Weinstein MD          Today, Patient Was Seen By: Adamaris Gomez MD    **Please Note: This note may have been constructed using a voice recognition system.**

## 2024-01-14 NOTE — ASSESSMENT & PLAN NOTE
Hypertensive on admission, initially thought to be 2/2 abdominal pain but BP remained persistently elevated while inpatient  No prior history of HTN per chart review  With abnormal EKG on admission, however Echo was normal  BP reviewed, improving without need for antihypertensives  Monitor BP per unit protocol  Blood Pressure: 126/89

## 2024-01-15 ENCOUNTER — APPOINTMENT (INPATIENT)
Dept: CT IMAGING | Facility: HOSPITAL | Age: 54
DRG: 329 | End: 2024-01-15
Payer: COMMERCIAL

## 2024-01-15 LAB
ANION GAP SERPL CALCULATED.3IONS-SCNC: 6 MMOL/L
BASOPHILS # BLD AUTO: 0.05 THOUSANDS/ÂΜL (ref 0–0.1)
BASOPHILS NFR BLD AUTO: 0 % (ref 0–1)
BUN SERPL-MCNC: 23 MG/DL (ref 5–25)
CALCIUM SERPL-MCNC: 8.7 MG/DL (ref 8.4–10.2)
CHLORIDE SERPL-SCNC: 97 MMOL/L (ref 96–108)
CO2 SERPL-SCNC: 45 MMOL/L (ref 21–32)
CREAT SERPL-MCNC: 1.14 MG/DL (ref 0.6–1.3)
EOSINOPHIL # BLD AUTO: 0.36 THOUSAND/ÂΜL (ref 0–0.61)
EOSINOPHIL NFR BLD AUTO: 3 % (ref 0–6)
ERYTHROCYTE [DISTWIDTH] IN BLOOD BY AUTOMATED COUNT: 15.2 % (ref 11.6–15.1)
GFR SERPL CREATININE-BSD FRML MDRD: 74 ML/MIN/1.73SQ M
GLUCOSE SERPL-MCNC: 136 MG/DL (ref 65–140)
HCT VFR BLD AUTO: 39.7 % (ref 36.5–49.3)
HGB BLD-MCNC: 12.9 G/DL (ref 12–17)
IMM GRANULOCYTES # BLD AUTO: 0.07 THOUSAND/UL (ref 0–0.2)
IMM GRANULOCYTES NFR BLD AUTO: 1 % (ref 0–2)
LYMPHOCYTES # BLD AUTO: 1.11 THOUSANDS/ÂΜL (ref 0.6–4.47)
LYMPHOCYTES NFR BLD AUTO: 8 % (ref 14–44)
MAGNESIUM SERPL-MCNC: 2.5 MG/DL (ref 1.9–2.7)
MCH RBC QN AUTO: 27.9 PG (ref 26.8–34.3)
MCHC RBC AUTO-ENTMCNC: 32.5 G/DL (ref 31.4–37.4)
MCV RBC AUTO: 86 FL (ref 82–98)
MONOCYTES # BLD AUTO: 1.33 THOUSAND/ÂΜL (ref 0.17–1.22)
MONOCYTES NFR BLD AUTO: 10 % (ref 4–12)
NEUTROPHILS # BLD AUTO: 10.81 THOUSANDS/ÂΜL (ref 1.85–7.62)
NEUTS SEG NFR BLD AUTO: 78 % (ref 43–75)
NRBC BLD AUTO-RTO: 0 /100 WBCS
PHOSPHATE SERPL-MCNC: 2.6 MG/DL (ref 2.7–4.5)
PLATELET # BLD AUTO: 407 THOUSANDS/UL (ref 149–390)
PMV BLD AUTO: 10 FL (ref 8.9–12.7)
POTASSIUM SERPL-SCNC: 3.1 MMOL/L (ref 3.5–5.3)
RBC # BLD AUTO: 4.63 MILLION/UL (ref 3.88–5.62)
SODIUM SERPL-SCNC: 148 MMOL/L (ref 135–147)
TRIGL SERPL-MCNC: 139 MG/DL
WBC # BLD AUTO: 13.73 THOUSAND/UL (ref 4.31–10.16)

## 2024-01-15 PROCEDURE — 88341 IMHCHEM/IMCYTCHM EA ADD ANTB: CPT | Performed by: PATHOLOGY

## 2024-01-15 PROCEDURE — 99024 POSTOP FOLLOW-UP VISIT: CPT | Performed by: PHYSICIAN ASSISTANT

## 2024-01-15 PROCEDURE — 74176 CT ABD & PELVIS W/O CONTRAST: CPT

## 2024-01-15 PROCEDURE — 83735 ASSAY OF MAGNESIUM: CPT | Performed by: SURGERY

## 2024-01-15 PROCEDURE — 84478 ASSAY OF TRIGLYCERIDES: CPT | Performed by: PHYSICIAN ASSISTANT

## 2024-01-15 PROCEDURE — 80048 BASIC METABOLIC PNL TOTAL CA: CPT | Performed by: SURGERY

## 2024-01-15 PROCEDURE — 84100 ASSAY OF PHOSPHORUS: CPT | Performed by: SURGERY

## 2024-01-15 PROCEDURE — 88307 TISSUE EXAM BY PATHOLOGIST: CPT | Performed by: PATHOLOGY

## 2024-01-15 PROCEDURE — NC001 PR NO CHARGE: Performed by: PHYSICIAN ASSISTANT

## 2024-01-15 PROCEDURE — 99232 SBSQ HOSP IP/OBS MODERATE 35: CPT | Performed by: PHYSICIAN ASSISTANT

## 2024-01-15 PROCEDURE — G1004 CDSM NDSC: HCPCS

## 2024-01-15 PROCEDURE — 85025 COMPLETE CBC W/AUTO DIFF WBC: CPT | Performed by: SURGERY

## 2024-01-15 PROCEDURE — 88342 IMHCHEM/IMCYTCHM 1ST ANTB: CPT | Performed by: PATHOLOGY

## 2024-01-15 RX ORDER — POTASSIUM CHLORIDE 14.9 MG/ML
20 INJECTION INTRAVENOUS
Status: COMPLETED | OUTPATIENT
Start: 2024-01-15 | End: 2024-01-16

## 2024-01-15 RX ADMIN — METRONIDAZOLE 500 MG: 500 INJECTION, SOLUTION INTRAVENOUS at 04:08

## 2024-01-15 RX ADMIN — CEFAZOLIN SODIUM 1000 MG: 1 SOLUTION INTRAVENOUS at 02:58

## 2024-01-15 RX ADMIN — ACETAMINOPHEN 1000 MG: 10 INJECTION INTRAVENOUS at 10:01

## 2024-01-15 RX ADMIN — ENOXAPARIN SODIUM 40 MG: 40 INJECTION SUBCUTANEOUS at 10:00

## 2024-01-15 RX ADMIN — DEXTROSE, SODIUM CHLORIDE, AND POTASSIUM CHLORIDE 150 ML/HR: 5; .9; .15 INJECTION INTRAVENOUS at 01:09

## 2024-01-15 RX ADMIN — ACETAMINOPHEN 1000 MG: 10 INJECTION INTRAVENOUS at 14:53

## 2024-01-15 RX ADMIN — POTASSIUM CHLORIDE 20 MEQ: 14.9 INJECTION, SOLUTION INTRAVENOUS at 17:14

## 2024-01-15 RX ADMIN — ACETAMINOPHEN 1000 MG: 10 INJECTION INTRAVENOUS at 22:01

## 2024-01-15 RX ADMIN — POTASSIUM CHLORIDE 20 MEQ: 14.9 INJECTION, SOLUTION INTRAVENOUS at 12:46

## 2024-01-15 RX ADMIN — ACETAMINOPHEN 1000 MG: 10 INJECTION INTRAVENOUS at 05:07

## 2024-01-15 RX ADMIN — DEXTROSE, SODIUM CHLORIDE, AND POTASSIUM CHLORIDE 150 ML/HR: 5; .9; .15 INJECTION INTRAVENOUS at 20:20

## 2024-01-15 RX ADMIN — METRONIDAZOLE 500 MG: 500 INJECTION, SOLUTION INTRAVENOUS at 19:49

## 2024-01-15 RX ADMIN — METRONIDAZOLE 500 MG: 500 INJECTION, SOLUTION INTRAVENOUS at 12:46

## 2024-01-15 RX ADMIN — CEFAZOLIN SODIUM 1000 MG: 1 SOLUTION INTRAVENOUS at 17:30

## 2024-01-15 RX ADMIN — POTASSIUM CHLORIDE 20 MEQ: 14.9 INJECTION, SOLUTION INTRAVENOUS at 10:25

## 2024-01-15 RX ADMIN — CEFAZOLIN SODIUM 1000 MG: 1 SOLUTION INTRAVENOUS at 10:32

## 2024-01-15 RX ADMIN — POTASSIUM CHLORIDE 20 MEQ: 14.9 INJECTION, SOLUTION INTRAVENOUS at 14:53

## 2024-01-15 RX ADMIN — SODIUM CHLORIDE, SODIUM LACTATE, POTASSIUM CHLORIDE, AND CALCIUM CHLORIDE 1000 ML: .6; .31; .03; .02 INJECTION, SOLUTION INTRAVENOUS at 09:56

## 2024-01-15 NOTE — PROGRESS NOTES
"Progress Note - General Surgery   Davis Goss 53 y.o. male MRN: [id number]  Unit/Bed#: -01 Encounter: [id number]    Assessment/Plan    52 y/o male presenting with LBO now POD#6 s/p Hartmans procedure, repair of bladder perforation   Pt c/o thirst  NGT output 3.4L in last 24hrs (prior 3.3L, 5.7L)  Also output from ostomy (300cc recorded last 24hrs)  Pt denies abdominal pain, nausea  Abdomen soft, non-distended, midline incision CDI with staples, stoma healthy with stool in bag, RLQ JOE with SS output (50cc)  Tachycardic, remainder of vitals stable  Na 148 (144)  K 3.1 (2.8)  Cl 97 (88)  Phos 2.6 (3.8)  Mag 2.5 (2.2)  WBC 13.73 (12.75)  UO 1.6L via Hannon    Continue NGT/NPO - advised pt to go easy on ice chips for accurate I/Os  1L LR bolus now  CT A/P to evaluate for obstruction given continued high NGT output (administer 1L PO contrast via NGT, clamp tube 1 hour, then obtain CT - d/w RN)  D5NS + [id number]/L @ 150cc/hr  K repletion  Consult placed for midline - for electrolyte repletion and possible TPN  Continue JOE  Continue Hannon  Encourage OOB/ambulation, IS  Pt seen and examined with attending      /89 (BP Location: Left arm)   Pulse 89   Temp 98 °F (36.7 °C) (Tympanic)   Resp 16   Ht 5' 11\" (1.803 m)   Wt 65 kg (143 lb 4.8 oz)   SpO2 98%   BMI 19.99 kg/m²     Labs in chart were reviewed.  Results from last 7 days   Lab Units 01/15/24  0528   WBC Thousand/uL 13.73*   HEMOGLOBIN g/dL 12.9   HEMATOCRIT % 39.7   PLATELETS Thousands/uL 407*     Results from last 7 days   Lab Units 01/15/24  0528   POTASSIUM mmol/L 3.1*   CHLORIDE mmol/L 97   CO2 mmol/L 45*   BUN mg/dL 23   CREATININE mg/dL 1.14     Results from last 7 days   Lab Units 01/15/24  0528   CALCIUM mg/dL 8.7   MAGNESIUM mg/dL 2.5   PHOSPHORUS mg/dL 2.6*           Intake/Output Summary (Last 24 hours) at 1/15/2024 0755  Last data filed at 1/15/2024 0601  Gross per 24 hour   Intake 2192.5 ml   Output 5200 ml   Net -3007.5 ml "           Subjective/Objective     Subjective: Pt seen and examined. Pt's mouth and lips are very dry and he is very hungry. No abdominal complaints. Continued high bilious output from NGT, however ostomy also functioning now.     Review of Systems   Constitutional:  Negative for chills and fever.   Respiratory:  Negative for shortness of breath.    Cardiovascular:  Negative for chest pain.   Gastrointestinal:  Negative for abdominal pain, nausea and vomiting.   Skin:  Positive for wound (surgical).          Objective:     Physical Exam  Vitals and nursing note reviewed.   Constitutional:       General: He is not in acute distress.  HENT:      Head: Normocephalic and atraumatic.   Eyes:      Extraocular Movements: Extraocular movements intact.   Cardiovascular:      Rate and Rhythm: Regular rhythm. Tachycardia present.   Pulmonary:      Effort: Pulmonary effort is normal. No respiratory distress.   Abdominal:      General: Bowel sounds are normal. There is no distension.      Palpations: Abdomen is soft.      Tenderness: There is no abdominal tenderness. There is no guarding.      Comments: Midline incision CDI with staples. JOE in place with SS output. Ostomy healthy appearing with stool in bag.    Genitourinary:     Comments: Hannon  Musculoskeletal:      Cervical back: Normal range of motion.   Skin:     General: Skin is warm and dry.   Neurological:      Mental Status: He is alert and oriented to person, place, and time.            Sandra Alexander PA-C  1/15/2024

## 2024-01-15 NOTE — PLAN OF CARE
Problem: PAIN - ADULT  Goal: Verbalizes/displays adequate comfort level or baseline comfort level  Description: Interventions:  - Encourage patient to monitor pain and request assistance  - Assess pain using appropriate pain scale  - Administer analgesics based on type and severity of pain and evaluate response  - Implement non-pharmacological measures as appropriate and evaluate response  - Consider cultural and social influences on pain and pain management  - Notify physician/advanced practitioner if interventions unsuccessful or patient reports new pain  Outcome: Progressing     Problem: DISCHARGE PLANNING  Goal: Discharge to home or other facility with appropriate resources  Description: INTERVENTIONS:  - Identify barriers to discharge w/patient and caregiver  - Arrange for needed discharge resources and transportation as appropriate  - Identify discharge learning needs (meds, wound care, etc.)  - Arrange for interpretive services to assist at discharge as needed  - Refer to Case Management Department for coordinating discharge planning if the patient needs post-hospital services based on physician/advanced practitioner order or complex needs related to functional status, cognitive ability, or social support system  Outcome: Progressing     Problem: Knowledge Deficit  Goal: Patient/family/caregiver demonstrates understanding of disease process, treatment plan, medications, and discharge instructions  Description: Complete learning assessment and assess knowledge base.  Interventions:  - Provide teaching at level of understanding  - Provide teaching via preferred learning methods  Outcome: Progressing     Problem: GASTROINTESTINAL - ADULT  Goal: Minimal or absence of nausea and/or vomiting  Description: INTERVENTIONS:  - Administer IV fluids if ordered to ensure adequate hydration  - Maintain NPO status until nausea and vomiting are resolved  - Nasogastric tube if ordered  - Administer ordered antiemetic  medications as needed  - Provide nonpharmacologic comfort measures as appropriate  - Advance diet as tolerated, if ordered  - Consider nutrition services referral to assist patient with adequate nutrition and appropriate food choices  Outcome: Progressing  Goal: Maintains or returns to baseline bowel function  Description: INTERVENTIONS:  - Assess bowel function  - Encourage oral fluids to ensure adequate hydration  - Administer IV fluids if ordered to ensure adequate hydration  - Administer ordered medications as needed  - Encourage mobilization and activity  - Consider nutritional services referral to assist patient with adequate nutrition and appropriate food choices  Outcome: Progressing  Goal: Maintains adequate nutritional intake  Description: INTERVENTIONS:  - Monitor percentage of each meal consumed  - Identify factors contributing to decreased intake, treat as appropriate  - Assist with meals as needed  - Monitor I&O, weight, and lab values if indicated  - Obtain nutrition services referral as needed  Outcome: Progressing     Problem: Nutrition/Hydration-ADULT  Goal: Nutrient/Hydration intake appropriate for improving, restoring or maintaining nutritional needs  Description: Monitor and assess patient's nutrition/hydration status for malnutrition. Collaborate with interdisciplinary team and initiate plan and interventions as ordered.  Monitor patient's weight and dietary intake as ordered or per policy. Utilize nutrition screening tool and intervene as necessary. Determine patient's food preferences and provide high-protein, high-caloric foods as appropriate.     INTERVENTIONS:  - Monitor oral intake, urinary output, labs, and treatment plans  - Assess nutrition and hydration status and recommend course of action  - Evaluate amount of meals eaten  - Assist patient with eating if necessary   - Allow adequateime for meals  - Recommend/ encourage appropriate diets, oral nutritional supplements, and  vitamin/mineral supplements  - Order, calculate, and assess calorie counts as needed  - Recommend, monitor, and adjust tube feedings and TPN/PPN based on assessed needs  - Assess need for intravenous fluids  - Provide specific nutrition/hydration education as appropriate  - Include patient/family/caregiver in decisions related to nutrition  Outcome: Progressing

## 2024-01-15 NOTE — PROGRESS NOTES
Pt seen and examined. NGT has been clamped since 1030 this morning. Pt denies any N/V or abdominal pain. CT official read pending. Films reviewed with Dr. Cheek; persistent ileus with no obstruction appreciated.    Continue clamp trial. Instructed pt to notify RN immediately if he becomes nauseous. Plan of care d/w RN.     Sandra Alexander  1/15/2024

## 2024-01-15 NOTE — UTILIZATION REVIEW
Continued Stay Review    Date: 1/13-1/15/2024                          Current Patient Class: inpatient  Current Level of Care: med/surg    HPI:53 y.o. male initially admitted on 1/7 for partial colonic obstruction, s/p sigmoid colectomy and Jo's on 1/9  Recurrent nausea/vomiting overnight (1/11-1/12), NGT replaced    Assessment/Plan:   1/13 -- denies n/v, abdominal pain. NG tube in place. High NG tube output resulting in saline depletion.  Colostomy with significant amount of soft born stool. WBC 12.55. K 3.0. Benson in place. Continue IVFs, analgesics, antiemetics. Replete potassium, BMP in AM. Supportive care. SCDs. Encourage OOB/ambulate.    1/14 -- feels hungry, denies n/v. + tachycardia. High NG output probably d/t severe electrolyte abnormalities.  Labs look worse today as LR was used as a replacement fluid yesterday.  Pt is in negative balance of 10 L since admission. K 2.8. Change IV fluids to D5NS w/ Kcl. Replace Kcl IV, monitor BMP in AM. Maintain NG tube because of high output in the last 24 hrs. Maintain Benson catheter    1/15 -- POD #6 -- Stoma is working however his NG output is still >3L.  Patient feels thirsty.  Potassium levels have marginally improved but are still low. Clinically abdomen is soft. There is bilious stool in the stoma bag. Tachycardia improved.  CT scan AP ordered. Clamp NG tube for at least 2 hrs prior to study. Continue NG tube, benson cath, JOE drain, Npo. Replace potassium, IL fluid bolus. Pt is neg fluid balance. PICC consult for electrolyte repletion and possible TPN.  Strict I/O. SCDs. Encourage OOB/ambulation, IS     Vital Signs:   Date/Time Temp Pulse Resp BP MAP (mmHg) SpO2 O2 Device Patient Position - Orthostatic VS   01/15/24 0718 98 °F (36.7 °C) 89 16 126/89 103 98 % None (Room air) Lying   01/15/24 0258 98.8 °F (37.1 °C) 91 18 123/79 96 95 % -- Lying   01/14/24 2219 98.1 °F (36.7 °C) 89 18 119/77 94 93 % None (Room air) Lying   01/14/24 1910 98.1 °F (36.7 °C) 89 18  119/82 96 93 % None (Room air) Lying   01/14/24 1540 98.2 °F (36.8 °C) 98 22 121/74 90 95 % None (Room air) Lying   01/14/24 1126 -- 95 16 120/75 -- 95 % -- Lying   01/14/24 0823 97.8 °F (36.6 °C) 103 16 122/77 -- 95 % -- Lying   01/14/24 0300 97.8 °F (36.6 °C) 97 18 118/80 -- -- -- Lying   01/13/24 2300 97.6 °F (36.4 °C) 94 18 121/68 91 95 % -- Lying   01/13/24 1500 97.8 °F (36.6 °C) 108 Abnormal  16 125/87 -- 94 % None (Room air) Lying   01/13/24 1234 98 °F (36.7 °C) 108 Abnormal  16 125/83 -- 96 % None (Room air) Lying   01/13/24 0823 97.6 °F (36.4 °C) 105 16 133/78 -- -- -- --   01/13/24 0334 98.3 °F (36.8 °C) 97 16 133/95 -- 94 % None (Room air) Lying       Pertinent Labs/Diagnostic Results:     CXR 1/12:   Nasogastric tube courses below the level of the diaphragm and projects over the stomach.   No acute cardiopulmonary pathology.   Dilated loops of air-filled small bowel are noted within the partially visualized abdomen. Consider follow-up with dedicated abdominal imaging as clinically warranted.     Results from last 7 days   Lab Units 01/15/24  0528 01/14/24  0440 01/13/24  0426 01/12/24  0548 01/11/24  0437   WBC Thousand/uL 13.73* 12.75* 12.55* 16.35* 15.64*   HEMOGLOBIN g/dL 12.9 12.8 13.3 13.2 11.7*   HEMATOCRIT % 39.7 38.5 38.6 38.3 35.2*   PLATELETS Thousands/uL 407* 408* 358 326 260   NEUTROS ABS Thousands/µL 10.81* 9.93* 10.26* 14.54* 13.44*     Results from last 7 days   Lab Units 01/15/24  0528 01/14/24  0440 01/13/24  0426 01/12/24  0548 01/11/24  1521 01/11/24  0437 01/10/24  0428   SODIUM mmol/L 148* 144 143 137 136   < > 139   POTASSIUM mmol/L 3.1* 2.8* 3.0* 3.5 3.8   < > 4.3   CHLORIDE mmol/L 97 88* 90* 95* 98   < > 102   CO2 mmol/L 45* 45* 45* 31 31   < > 29   ANION GAP mmol/L 6 11 8 11 7   < > 8   BUN mg/dL 23 23 17 13 15   < > 19   CREATININE mg/dL 1.14 1.22 1.03 0.82 0.78   < > 1.07   EGFR ml/min/1.73sq m 74 68 83 102 104   < > 79   CALCIUM mg/dL 8.7 8.6 8.7 9.1 8.4   < > 8.2*   MAGNESIUM  mg/dL 2.5 2.2 2.2 2.0  --   --  1.6*   PHOSPHORUS mg/dL 2.6*  --   --  3.8  --   --  4.4    < > = values in this interval not displayed.     Results from last 7 days   Lab Units 01/13/24  0426 01/11/24  0437   AST U/L 12* 28   ALT U/L 7 10   ALK PHOS U/L 51 50   TOTAL PROTEIN g/dL 6.5 6.1*   ALBUMIN g/dL 3.0* 2.9*   TOTAL BILIRUBIN mg/dL 0.61 0.70     Results from last 7 days   Lab Units 01/13/24  0717 01/09/24  1214   POC GLUCOSE mg/dl 144* 134     Results from last 7 days   Lab Units 01/15/24  0528 01/14/24  0440 01/13/24  0426 01/12/24  0548 01/11/24  1521 01/11/24  0437 01/10/24  0428 01/09/24  1525 01/09/24  0613   GLUCOSE RANDOM mg/dL 136 105 115 132 112 64* 108 114 59*       Medications:   Scheduled Medications:  acetaminophen, 1,000 mg, Intravenous, Q6H PENELOPE  cefazolin, 1,000 mg, Intravenous, Q8H  enoxaparin, 40 mg, Subcutaneous, Daily  lactated ringers, 1,000 mL, Intravenous, Once  metroNIDAZOLE, 500 mg, Intravenous, Q8H  nicotine, 1 patch, Transdermal, Daily  potassium chloride, 20 mEq, Intravenous, Q2H While awake    Continuous IV Infusions:  dextrose 5 % and sodium chloride 0.9 % with KCl 20 mEq/L, 150 mL/hr, Intravenous, Continuous    PRN Meds:  diphenhydrAMINE, 25 mg, Intravenous, Q6H PRN  hydrALAZINE, 10 mg, Intravenous, Q6H PRN  HYDROmorphone, 0.5 mg, Intravenous, Q6H PRN 1/13 x1, 1/14 x1  Ketorolac 15 mg IV Q6H PRN 1/13 x1 then d/c'd  metoclopramide, 5 mg, Intravenous, Q6H PRN   naloxone, 0.04 mg, Intravenous, Q1MIN PRN  naloxone, 0.04 mg, Intravenous, Q3 min PRN  ondansetron, 4 mg, Intravenous, Q6H PRN  oxyCODONE, 10 mg, Oral, Q6H PRN      Discharge Plan: TBD    Network Utilization Review Department  ATTENTION: Please call with any questions or concerns to [phone] and carefully listen to the prompts so that you are directed to the right person. All voicemails are confidential.   For Discharge needs, contact Care Management DC Support Team at [phone] opt. 2  Send all requests for admission  clinical reviews, approved or denied determinations and any other requests to dedicated fax number below belonging to the campus where the patient is receiving treatment. List of dedicated fax numbers for the Facilities:  FACILITY NAME UR FAX NUMBER   ADMISSION DENIALS (Administrative/Medical Necessity) [phone]   DISCHARGE SUPPORT TEAM (NETWORK) [phone]   PARENT CHILD HEALTH (Maternity/NICU/Pediatrics) [phone]   [facility] [phone]   [facility] [phone]   [facility] [phone]   [facility] [phone]   [facility] [phone]   [facility] [phone]   [facility] [phone]   [facility] [phone]   [facility] [phone]   [facility] [phone]   [facility] [phone]

## 2024-01-15 NOTE — PROGRESS NOTES
[facility]  Progress Note  Name: Davis Goss I  MRN: [id number]  Unit/Bed#: -01 I Date of Admission: 1/7/2024   Date of Service: 1/15/2024 I Hospital Day: 8    Assessment/Plan   * Colonic mass  Assessment & Plan  Presented with ongoing constipation, with reported weight loss for at least a year, associated with loss of appetite, nausea and vomiting following meals since end of Dec '23. Initially seen in ED 1/6, left AMA but returned on 1/7 for further evaluation.   CT A/P: Partial colonic obstruction 2/2 7 cm long apple core lesion of mid sigmoid colon suspicious for colonic malignancy.  CEA wnl, CT chest no evidence of metastatic disease   S/p ex lap, sigmoid colectomy with Jo's procedure and repair of bladder perforation (1/9)  S/p pelvic JOE drain removal (1/11)  Recurrent nausea/vomiting overnight (1/11-1/12), NGT replaced  General surgery following,   Continue IV Ancef/Flagyl   Maintain NGT and NPO status until cleared for clamp trial  Continue IVFs, analgesics, antiemetics  Maintain Hannon catheter upon discharge with outpatient urology F/U due to perforation     Severe protein-calorie malnutrition (HCC)  Assessment & Plan  Malnutrition Findings:   Adult Malnutrition type: Acute illness  Adult Degree of Malnutrition: Other severe protein calorie malnutrition  Malnutrition Characteristics: Muscle loss, Inadequate energy      360 Statement: Moderate acute malnutrition r/t inadequate energy intake with colonic mass as evidenced by severe muscle loss (temples), intake < 50% of estimated needs x > 5 days.  Will treat with nutrition therapy and PO diet/supplement recommendations upon diet advancement.  BMI Findings:   Body mass index is 19.89 kg/m².   Nutrition following    Hypokalemia  Assessment & Plan  Hypokalemia in setting of GI issues and NPO state  K currently improved to 3.1, Mg currently 2.5  Patient is currently n.p.o. so will replace with IV and monitor       Substance  use  Assessment & Plan  Patient reports drinking liquor and beer multiple times a week, unable to specify quantity  Patient admits to occasional marijuana and cocaine use   UDS: (+) Opiates and Cocaine. Patient received IV Dilaudid and PO oxycodone inpatient prior to collection.  CIWA negative x 72 hours - D/C protocol    Smoking  Assessment & Plan  Smokes 1/2 PPD  NRT while admitted  Educated on smoking cessation    Elevated blood pressure reading  Assessment & Plan  Hypertensive on admission, initially thought to be 2/2 abdominal pain but BP remained persistently elevated while inpatient  No prior history of HTN per chart review  With abnormal EKG on admission, however Echo was normal  BP reviewed, improving without need for antihypertensives  Monitor BP per unit protocol  Blood Pressure: 126/89           VTE Pharmacologic Prophylaxis: VTE Score: 3 Moderate Risk (Score 3-4) - Pharmacological DVT Prophylaxis Ordered: enoxaparin (Lovenox).    Mobility:   Basic Mobility Inpatient Raw Score: 18  JH-HLM Goal: 6: Walk 10 steps or more  JH-HLM Achieved: 6: Walk 10 steps or more  HLM Goal achieved. Continue to encourage appropriate mobility.    Patient Centered Rounds: I performed bedside rounds with nursing staff today.   Discussions with Specialists or Other Care Team Provider: surgery, CM      Education and Discussions with Family / Patient: Updated  (family) at bedside.    Total Time Spent on Date of Encounter in care of patient: 45 mins. This time was spent on one or more of the following: performing physical exam; counseling and coordination of care; obtaining or reviewing history; documenting in the medical record; reviewing/ordering tests, medications or procedures; communicating with other healthcare professionals and discussing with patient's family/caregivers.    Current Length of Stay: 8 day(s)  Current Patient Status: Inpatient   Certification Statement: The patient will continue to require  additional inpatient hospital stay due to ongoing need for NPO state; surgery planning to assess with CT with contrast   Discharge Plan: Anticipate discharge in >72 hrs to home.    Code Status: Level 1 - Full Code    Subjective:   Patient seen this morning, a little blunted with his responses.  He would like a tunafish sandwich and some Gatorade, understands that the NG tube is clamped so that he can get a contrasted CT for further evaluation.  When questioned regarding any nausea, he reports he has not had any for days.  Denies any abdominal pain or chest pain presently.  He is unable to quantify any ostomy output, states I think they are draining it 2 times a day.    Objective:     Vitals:   Temp (24hrs), Av.2 °F (36.8 °C), Min:98 °F (36.7 °C), Max:98.8 °F (37.1 °C)    Temp:  [98 °F (36.7 °C)-98.8 °F (37.1 °C)] 98 °F (36.7 °C)  HR:  [89-98] 89  Resp:  [16-22] 16  BP: (119-126)/(74-89) 126/89  SpO2:  [93 %-98 %] 98 %  Body mass index is 19.99 kg/m².     Input and Output Summary (last 24 hours):     Intake/Output Summary (Last 24 hours) at 1/15/2024 0912  Last data filed at 1/15/2024 0601  Gross per 24 hour   Intake 2192.5 ml   Output 5200 ml   Net -3007.5 ml       Physical Exam:   Physical Exam  Vitals and nursing note reviewed.   Constitutional:       General: He is awake. He is not in acute distress.     Appearance: Normal appearance. He is cachectic. He is ill-appearing. He is not toxic-appearing.   Cardiovascular:      Rate and Rhythm: Normal rate and regular rhythm.      Heart sounds: Normal heart sounds. No murmur heard.  Pulmonary:      Effort: Pulmonary effort is normal. No respiratory distress.      Breath sounds: Normal breath sounds.   Abdominal:      General: Abdomen is flat. The ostomy site is clean. Bowel sounds are decreased. There is no distension.      Palpations: Abdomen is soft.      Comments: NG tube clamped   Skin:     Coloration: Skin is not jaundiced or pale.   Neurological:      Mental  Status: He is alert and oriented to person, place, and time.   Psychiatric:         Mood and Affect: Mood normal. Affect is blunt.         Behavior: Behavior normal. Behavior is cooperative.          Additional Data:     Labs:  Results from last 7 days   Lab Units 01/15/24  0528   WBC Thousand/uL 13.73*   HEMOGLOBIN g/dL 12.9   HEMATOCRIT % 39.7   PLATELETS Thousands/uL 407*   NEUTROS PCT % 78*   LYMPHS PCT % 8*   MONOS PCT % 10   EOS PCT % 3     Results from last 7 days   Lab Units 01/15/24  0528 01/14/24  0440 01/13/24  0426   SODIUM mmol/L 148*   < > 143   POTASSIUM mmol/L 3.1*   < > 3.0*   CHLORIDE mmol/L 97   < > 90*   CO2 mmol/L 45*   < > 45*   BUN mg/dL 23   < > 17   CREATININE mg/dL 1.14   < > 1.03   ANION GAP mmol/L 6   < > 8   CALCIUM mg/dL 8.7   < > 8.7   ALBUMIN g/dL  --   --  3.0*   TOTAL BILIRUBIN mg/dL  --   --  0.61   ALK PHOS U/L  --   --  51   ALT U/L  --   --  7   AST U/L  --   --  12*   GLUCOSE RANDOM mg/dL 136   < > 115    < > = values in this interval not displayed.         Results from last 7 days   Lab Units 01/13/24  0717 01/09/24  1214   POC GLUCOSE mg/dl 144* 134               Lines/Drains:  Invasive Devices       Peripheral Intravenous Line  Duration             Peripheral IV 01/09/24 Left;Upper;Ventral (anterior) Arm 5 days    Peripheral IV 01/13/24 Right;Ventral (anterior) Forearm 2 days              Drain  Duration             Colostomy LLQ 6 days    Urethral Catheter Latex 16 Fr. 6 days    Closed/Suction Drain Lateral;Right Abdomen Bulb 15 Fr. 5 days    Closed/Suction Drain Right Abdomen Bulb 15 Fr. 5 days    NG/OG/Enteral Tube Nasogastric 16 Fr Right nare 3 days                  Urinary Catheter:  Goal for removal: N/A- Discharging with Hannon         Imaging: No pertinent imaging reviewed.    Recent Cultures (last 7 days):         Last 24 Hours Medication List:   Current Facility-Administered Medications   Medication Dose Route Frequency Provider Last Rate    acetaminophen  1,000 mg  Intravenous Q6H PENELOPE Surekha Michelle PA-C 1,000 mg (01/15/24 0507)    cefazolin  1,000 mg Intravenous Q8H Surekha Michelle PA-C 1,000 mg (01/15/24 0258)    dextrose 5 % and sodium chloride 0.9 % with KCl 20 mEq/L  150 mL/hr Intravenous Continuous David Weinstein  mL/hr (01/15/24 0109)    diphenhydrAMINE  25 mg Intravenous Q6H PRN Surekha Michelle PA-C      enoxaparin  40 mg Subcutaneous Daily Surekha Michelle PA-C      hydrALAZINE  10 mg Intravenous Q6H PRN Shaun Sidhu DO      HYDROmorphone  0.5 mg Intravenous Q6H PRN Sandra Alexander PA-C      lactated ringers  1,000 mL Intravenous Once Sandra Alexander PA-C      metoclopramide  5 mg Intravenous Q6H PRN Surekha Michelle PA-C      metroNIDAZOLE  500 mg Intravenous Q8H Surekha Michelle PA-C 500 mg (01/15/24 0408)    naloxone  0.04 mg Intravenous Q1MIN PRN Surekha Michelle PA-C      naloxone  0.04 mg Intravenous Q3 min PRN Surekha Michelle PA-C      nicotine  1 patch Transdermal Daily Surekha Michelle PA-C      ondansetron  4 mg Intravenous Q6H PRN Surekha Michelle PA-C      oxyCODONE  10 mg Oral Q6H PRN Sandra Alexander PA-C      potassium chloride  20 mEq Intravenous Q2H While awake Sandra Alexander PA-C          Today, Patient Was Seen By: Yesica Reyes PA-C    **Please Note: This note may have been constructed using a voice recognition system.**

## 2024-01-16 ENCOUNTER — APPOINTMENT (INPATIENT)
Dept: INTERVENTIONAL RADIOLOGY/VASCULAR | Facility: HOSPITAL | Age: 54
DRG: 329 | End: 2024-01-16
Payer: COMMERCIAL

## 2024-01-16 LAB
ALBUMIN SERPL BCP-MCNC: 2.8 G/DL (ref 3.5–5)
ALP SERPL-CCNC: 43 U/L (ref 34–104)
ALT SERPL W P-5'-P-CCNC: 15 U/L (ref 7–52)
ANION GAP SERPL CALCULATED.3IONS-SCNC: 6 MMOL/L
AST SERPL W P-5'-P-CCNC: 27 U/L (ref 13–39)
BASOPHILS # BLD AUTO: 0.07 THOUSANDS/ÂΜL (ref 0–0.1)
BASOPHILS NFR BLD AUTO: 0 % (ref 0–1)
BILIRUB SERPL-MCNC: 0.45 MG/DL (ref 0.2–1)
BUN SERPL-MCNC: 20 MG/DL (ref 5–25)
CALCIUM ALBUM COR SERPL-MCNC: 9.4 MG/DL (ref 8.3–10.1)
CALCIUM SERPL-MCNC: 8.4 MG/DL (ref 8.4–10.2)
CHLORIDE SERPL-SCNC: 103 MMOL/L (ref 96–108)
CO2 SERPL-SCNC: 33 MMOL/L (ref 21–32)
CREAT SERPL-MCNC: 0.81 MG/DL (ref 0.6–1.3)
EOSINOPHIL # BLD AUTO: 0.56 THOUSAND/ÂΜL (ref 0–0.61)
EOSINOPHIL NFR BLD AUTO: 4 % (ref 0–6)
ERYTHROCYTE [DISTWIDTH] IN BLOOD BY AUTOMATED COUNT: 15.2 % (ref 11.6–15.1)
GFR SERPL CREATININE-BSD FRML MDRD: 102 ML/MIN/1.73SQ M
GLUCOSE SERPL-MCNC: 109 MG/DL (ref 65–140)
HCT VFR BLD AUTO: 34.8 % (ref 36.5–49.3)
HGB BLD-MCNC: 11.2 G/DL (ref 12–17)
IMM GRANULOCYTES # BLD AUTO: 0.09 THOUSAND/UL (ref 0–0.2)
IMM GRANULOCYTES NFR BLD AUTO: 1 % (ref 0–2)
LYMPHOCYTES # BLD AUTO: 1.23 THOUSANDS/ÂΜL (ref 0.6–4.47)
LYMPHOCYTES NFR BLD AUTO: 8 % (ref 14–44)
MAGNESIUM SERPL-MCNC: 2.2 MG/DL (ref 1.9–2.7)
MCH RBC QN AUTO: 28 PG (ref 26.8–34.3)
MCHC RBC AUTO-ENTMCNC: 32.2 G/DL (ref 31.4–37.4)
MCV RBC AUTO: 87 FL (ref 82–98)
MONOCYTES # BLD AUTO: 1.25 THOUSAND/ÂΜL (ref 0.17–1.22)
MONOCYTES NFR BLD AUTO: 8 % (ref 4–12)
NEUTROPHILS # BLD AUTO: 12.44 THOUSANDS/ÂΜL (ref 1.85–7.62)
NEUTS SEG NFR BLD AUTO: 79 % (ref 43–75)
NRBC BLD AUTO-RTO: 0 /100 WBCS
PHOSPHATE SERPL-MCNC: 2.3 MG/DL (ref 2.7–4.5)
PLATELET # BLD AUTO: 397 THOUSANDS/UL (ref 149–390)
PMV BLD AUTO: 11.8 FL (ref 8.9–12.7)
POTASSIUM SERPL-SCNC: 3.8 MMOL/L (ref 3.5–5.3)
PROT SERPL-MCNC: 6.6 G/DL (ref 6.4–8.4)
RBC # BLD AUTO: 4 MILLION/UL (ref 3.88–5.62)
SODIUM SERPL-SCNC: 142 MMOL/L (ref 135–147)
WBC # BLD AUTO: 15.64 THOUSAND/UL (ref 4.31–10.16)

## 2024-01-16 PROCEDURE — 97530 THERAPEUTIC ACTIVITIES: CPT

## 2024-01-16 PROCEDURE — 87186 SC STD MICRODIL/AGAR DIL: CPT | Performed by: PHYSICIAN ASSISTANT

## 2024-01-16 PROCEDURE — 80053 COMPREHEN METABOLIC PANEL: CPT | Performed by: PHYSICIAN ASSISTANT

## 2024-01-16 PROCEDURE — 97116 GAIT TRAINING THERAPY: CPT

## 2024-01-16 PROCEDURE — 76937 US GUIDE VASCULAR ACCESS: CPT

## 2024-01-16 PROCEDURE — C1751 CATH, INF, PER/CENT/MIDLINE: HCPCS

## 2024-01-16 PROCEDURE — 87070 CULTURE OTHR SPECIMN AEROBIC: CPT | Performed by: PHYSICIAN ASSISTANT

## 2024-01-16 PROCEDURE — 84100 ASSAY OF PHOSPHORUS: CPT | Performed by: PHYSICIAN ASSISTANT

## 2024-01-16 PROCEDURE — 87077 CULTURE AEROBIC IDENTIFY: CPT | Performed by: PHYSICIAN ASSISTANT

## 2024-01-16 PROCEDURE — 83735 ASSAY OF MAGNESIUM: CPT | Performed by: PHYSICIAN ASSISTANT

## 2024-01-16 PROCEDURE — 99232 SBSQ HOSP IP/OBS MODERATE 35: CPT | Performed by: INTERNAL MEDICINE

## 2024-01-16 PROCEDURE — 85025 COMPLETE CBC W/AUTO DIFF WBC: CPT | Performed by: PHYSICIAN ASSISTANT

## 2024-01-16 PROCEDURE — NC001 PR NO CHARGE: Performed by: RADIOLOGY

## 2024-01-16 PROCEDURE — 99024 POSTOP FOLLOW-UP VISIT: CPT | Performed by: SURGERY

## 2024-01-16 PROCEDURE — 87205 SMEAR GRAM STAIN: CPT | Performed by: PHYSICIAN ASSISTANT

## 2024-01-16 RX ORDER — POTASSIUM CHLORIDE 20MEQ/15ML
20 LIQUID (ML) ORAL ONCE
Status: COMPLETED | OUTPATIENT
Start: 2024-01-16 | End: 2024-01-16

## 2024-01-16 RX ORDER — DEXTROSE AND SODIUM CHLORIDE 5; .9 G/100ML; G/100ML
75 INJECTION, SOLUTION INTRAVENOUS CONTINUOUS
Status: DISCONTINUED | OUTPATIENT
Start: 2024-01-16 | End: 2024-01-18

## 2024-01-16 RX ORDER — LIDOCAINE HYDROCHLORIDE 10 MG/ML
INJECTION, SOLUTION EPIDURAL; INFILTRATION; INTRACAUDAL; PERINEURAL AS NEEDED
Status: COMPLETED | OUTPATIENT
Start: 2024-01-16 | End: 2024-01-16

## 2024-01-16 RX ORDER — AMOXICILLIN AND CLAVULANATE POTASSIUM 875; 125 MG/1; MG/1
1 TABLET, FILM COATED ORAL EVERY 12 HOURS SCHEDULED
Status: DISCONTINUED | OUTPATIENT
Start: 2024-01-16 | End: 2024-01-18

## 2024-01-16 RX ORDER — ACETAMINOPHEN 325 MG/1
650 TABLET ORAL EVERY 6 HOURS SCHEDULED
Status: DISCONTINUED | OUTPATIENT
Start: 2024-01-16 | End: 2024-01-19

## 2024-01-16 RX ADMIN — DEXTROSE AND SODIUM CHLORIDE 125 ML/HR: 5; .9 INJECTION, SOLUTION INTRAVENOUS at 23:33

## 2024-01-16 RX ADMIN — METRONIDAZOLE 500 MG: 500 INJECTION, SOLUTION INTRAVENOUS at 02:33

## 2024-01-16 RX ADMIN — AMOXICILLIN AND CLAVULANATE POTASSIUM 1 TABLET: 875; 125 TABLET, FILM COATED ORAL at 21:21

## 2024-01-16 RX ADMIN — CEFAZOLIN SODIUM 1000 MG: 1 SOLUTION INTRAVENOUS at 01:36

## 2024-01-16 RX ADMIN — LIDOCAINE HYDROCHLORIDE 1 ML: 10 INJECTION, SOLUTION EPIDURAL; INFILTRATION; INTRACAUDAL at 09:58

## 2024-01-16 RX ADMIN — ACETAMINOPHEN 650 MG: 325 TABLET, FILM COATED ORAL at 12:15

## 2024-01-16 RX ADMIN — ACETAMINOPHEN 650 MG: 325 TABLET, FILM COATED ORAL at 17:19

## 2024-01-16 RX ADMIN — DEXTROSE AND SODIUM CHLORIDE 125 ML/HR: 5; .9 INJECTION, SOLUTION INTRAVENOUS at 14:11

## 2024-01-16 RX ADMIN — CEFAZOLIN SODIUM 1000 MG: 1 SOLUTION INTRAVENOUS at 10:32

## 2024-01-16 RX ADMIN — ENOXAPARIN SODIUM 40 MG: 40 INJECTION SUBCUTANEOUS at 08:42

## 2024-01-16 RX ADMIN — AMOXICILLIN AND CLAVULANATE POTASSIUM 1 TABLET: 875; 125 TABLET, FILM COATED ORAL at 12:15

## 2024-01-16 RX ADMIN — POTASSIUM PHOSPHATE, MONOBASIC AND POTASSIUM PHOSPHATE, DIBASIC 21 MMOL: 224; 236 INJECTION, SOLUTION, CONCENTRATE INTRAVENOUS at 13:52

## 2024-01-16 RX ADMIN — POTASSIUM CHLORIDE 20 MEQ: 1.5 SOLUTION ORAL at 10:36

## 2024-01-16 RX ADMIN — OXYCODONE HYDROCHLORIDE 10 MG: 10 TABLET ORAL at 16:06

## 2024-01-16 RX ADMIN — SODIUM CHLORIDE 500 ML: 0.9 INJECTION, SOLUTION INTRAVENOUS at 12:18

## 2024-01-16 RX ADMIN — ACETAMINOPHEN 1000 MG: 10 INJECTION INTRAVENOUS at 08:43

## 2024-01-16 RX ADMIN — ACETAMINOPHEN 650 MG: 325 TABLET, FILM COATED ORAL at 23:38

## 2024-01-16 NOTE — UTILIZATION REVIEW
Continued Stay Review    Date: 1/16/2024                          Current Patient Class: inpatient  Current Level of Care: med/surg    HPI:53 y.o. male initially admitted on 1/7     Assessment/Plan: POD7 s/p Jo's procedure, repair of bladder perforation  Post op wound abscess  -AF, VSS, normal effort on RA  -Leukocytosis WBC 15.64  -Anemia, Hgb 11.2 (12.9) likely 2/2 hemodilution   -Cr 0.81 (1.14, 1.22, 1.03) improved  -K improved today 3.8  -NG OP 500cc  -UOP 1.6L benson in place, urine slightly bloody   -Ostomy , colostomy pink, viable, fx  -JOE x 1 dark sanguinous fluid   -NGT clamp trial successful, no further n/v or abd distension, scant residual  -CTAP 1/15 showed pancolitis likely infectious and moderately diffuse SB dilation consistent with ileus   -Midline laparotomy wound evaluated. The inferior portion of the wound staples were taken out and wound was probed with Q tip. There was a moderate amount of sanguinous/purulent drainage. The wound was approx 1cm deep and tunneled 4 cm superiorly. This opening was packed with iodoform guaze and was covered with 2x2/ tegaderm to seal and avoid any contamination with stool.      PLAN:  -IR consulted for insertion of midline for access and repletion of K   -NGT removed, trial clears  -MIVF, decrease to 100ml/hr  -Received total 7 days abx for contamination in OR, d/c. Start PO Augmentin for wound infection.   -Replete elytes prn   -Recheck Phos   -Trend fever curve, wbc, elytes  -I/Os  -Nicotine patch while inpatient. Smoking cessation education.   -DVT ppx, Lovenox  -IV analgesia prn  -Encourage incentive spirometry, OOB/ambulation   -Maintain benson, flush with sterile saline q 4 hrs.   -JOE drain care, change dressing daily, strip tubing q shift   -PT/OT, plan for d/c home C      Vital Signs:   Date/Time Temp Pulse Resp BP MAP (mmHg) SpO2 O2 Device Patient Position - Orthostatic VS   01/16/24 1141 98.1 °F (36.7 °C) 85 20 154/105 Abnormal  -- 100 % None  (Room air) Lying   01/16/24 0801 97.8 °F (36.6 °C) 94 20 156/87 -- 99 % None (Room air) Lying       Pertinent Labs/Diagnostic Results:     Results from last 7 days   Lab Units 01/16/24  0524 01/15/24  0528 01/14/24  0440 01/13/24  0426 01/12/24  0548   WBC Thousand/uL 15.64* 13.73* 12.75* 12.55* 16.35*   HEMOGLOBIN g/dL 11.2* 12.9 12.8 13.3 13.2   HEMATOCRIT % 34.8* 39.7 38.5 38.6 38.3   PLATELETS Thousands/uL 397* 407* 408* 358 326   NEUTROS ABS Thousands/µL 12.44* 10.81* 9.93* 10.26* 14.54*     Results from last 7 days   Lab Units 01/16/24  0524 01/15/24  0528 01/14/24  0440 01/13/24  0426 01/12/24  0548 01/11/24  0437 01/10/24  0428   SODIUM mmol/L 142 148* 144 143 137   < > 139   POTASSIUM mmol/L 3.8 3.1* 2.8* 3.0* 3.5   < > 4.3   CHLORIDE mmol/L 103 97 88* 90* 95*   < > 102   CO2 mmol/L 33* 45* 45* 45* 31   < > 29   ANION GAP mmol/L 6 6 11 8 11   < > 8   BUN mg/dL 20 23 23 17 13   < > 19   CREATININE mg/dL 0.81 1.14 1.22 1.03 0.82   < > 1.07   EGFR ml/min/1.73sq m 102 74 68 83 102   < > 79   CALCIUM mg/dL 8.4 8.7 8.6 8.7 9.1   < > 8.2*   MAGNESIUM mg/dL 2.2 2.5 2.2 2.2 2.0  --  1.6*   PHOSPHORUS mg/dL 2.3* 2.6*  --   --  3.8  --  4.4    < > = values in this interval not displayed.     Results from last 7 days   Lab Units 01/16/24 0524 01/13/24 0426 01/11/24  0437   AST U/L 27 12* 28   ALT U/L 15 7 10   ALK PHOS U/L 43 51 50   TOTAL PROTEIN g/dL 6.6 6.5 6.1*   ALBUMIN g/dL 2.8* 3.0* 2.9*   TOTAL BILIRUBIN mg/dL 0.45 0.61 0.70     Results from last 7 days   Lab Units 01/16/24  0524 01/15/24  0528 01/14/24  0440 01/13/24  0426 01/12/24  0548 01/11/24  1521 01/11/24  0437 01/10/24  0428 01/09/24  1525   GLUCOSE RANDOM mg/dL 109 136 105 115 132 112 64* 108 114       Medications:   Scheduled Medications:  acetaminophen, 650 mg, Oral, Q6H PENELOPE  amoxicillin-clavulanate, 1 tablet, Oral, Q12H PENELOPE  enoxaparin, 40 mg, Subcutaneous, Daily  nicotine, 1 patch, Transdermal, Daily  potassium phosphate, 21 mmol, Intravenous,  Once    Continuous IV Infusions:  dextrose 5 % and sodium chloride 0.9 %, 125 mL/hr, Intravenous, Continuous    PRN Meds:  diphenhydrAMINE, 25 mg, Intravenous, Q6H PRN  hydrALAZINE, 10 mg, Intravenous, Q6H PRN  HYDROmorphone, 0.5 mg, Intravenous, Q6H PRN  iohexol, 50 mL, Oral, Once in imaging  metoclopramide, 5 mg, Intravenous, Q6H PRN  naloxone, 0.04 mg, Intravenous, Q1MIN PRN  naloxone, 0.04 mg, Intravenous, Q3 min PRN  ondansetron, 4 mg, Intravenous, Q6H PRN  oxyCODONE, 10 mg, Oral, Q6H PRN        Discharge Plan: D    Network Utilization Review Department  ATTENTION: Please call with any questions or concerns to [phone] and carefully listen to the prompts so that you are directed to the right person. All voicemails are confidential.   For Discharge needs, contact Care Management DC Support Team at [phone] opt. 2  Send all requests for admission clinical reviews, approved or denied determinations and any other requests to dedicated fax number below belonging to the campus where the patient is receiving treatment. List of dedicated fax numbers for the Facilities:  FACILITY NAME UR FAX NUMBER   ADMISSION DENIALS (Administrative/Medical Necessity) [phone]   DISCHARGE SUPPORT TEAM (NETWORK) [phone]   PARENT CHILD HEALTH (Maternity/NICU/Pediatrics) [phone]   [facility] [phone]   [facility] [phone]   [facility] [phone]   [facility] [phone]   [facility] [phone]   [facility] [phone]   [facility] [phone]   [facility] [phone]   [facility] [phone]   [facility] [phone]   [facility] [phone]

## 2024-01-16 NOTE — ASSESSMENT & PLAN NOTE
Malnutrition Findings:   Adult Malnutrition type: Acute illness  Adult Degree of Malnutrition: Other severe protein calorie malnutrition  Malnutrition Characteristics: Muscle loss, Inadequate energy      360 Statement: Moderate acute malnutrition r/t inadequate energy intake with colonic mass as evidenced by severe muscle loss (temples), intake < 50% of estimated needs x > 5 days.  Will treat with nutrition therapy and PO diet/supplement recommendations upon diet advancement.  BMI Findings:   Body mass index is 20.54 kg/m².   Nutrition following

## 2024-01-16 NOTE — PLAN OF CARE
Problem: PAIN - ADULT  Goal: Verbalizes/displays adequate comfort level or baseline comfort level  Description: Interventions:  - Encourage patient to monitor pain and request assistance  - Assess pain using appropriate pain scale  - Administer analgesics based on type and severity of pain and evaluate response  - Implement non-pharmacological measures as appropriate and evaluate response  - Consider cultural and social influences on pain and pain management  - Notify physician/advanced practitioner if interventions unsuccessful or patient reports new pain  Outcome: Progressing     Problem: DISCHARGE PLANNING  Goal: Discharge to home or other facility with appropriate resources  Description: INTERVENTIONS:  - Identify barriers to discharge w/patient and caregiver  - Arrange for needed discharge resources and transportation as appropriate  - Identify discharge learning needs (meds, wound care, etc.)  - Arrange for interpretive services to assist at discharge as needed  - Refer to Case Management Department for coordinating discharge planning if the patient needs post-hospital services based on physician/advanced practitioner order or complex needs related to functional status, cognitive ability, or social support system  Outcome: Progressing     Problem: Knowledge Deficit  Goal: Patient/family/caregiver demonstrates understanding of disease process, treatment plan, medications, and discharge instructions  Description: Complete learning assessment and assess knowledge base.  Interventions:  - Provide teaching at level of understanding  - Provide teaching via preferred learning methods  Outcome: Progressing     Problem: GASTROINTESTINAL - ADULT  Goal: Minimal or absence of nausea and/or vomiting  Description: INTERVENTIONS:  - Administer IV fluids if ordered to ensure adequate hydration  - Maintain NPO status until nausea and vomiting are resolved  - Nasogastric tube if ordered  - Administer ordered antiemetic  medications as needed  - Provide nonpharmacologic comfort measures as appropriate  - Advance diet as tolerated, if ordered  - Consider nutrition services referral to assist patient with adequate nutrition and appropriate food choices  Outcome: Progressing  Goal: Maintains or returns to baseline bowel function  Description: INTERVENTIONS:  - Assess bowel function  - Encourage oral fluids to ensure adequate hydration  - Administer IV fluids if ordered to ensure adequate hydration  - Administer ordered medications as needed  - Encourage mobilization and activity  - Consider nutritional services referral to assist patient with adequate nutrition and appropriate food choices  Outcome: Progressing  Goal: Maintains adequate nutritional intake  Description: INTERVENTIONS:  - Monitor percentage of each meal consumed  - Identify factors contributing to decreased intake, treat as appropriate  - Assist with meals as needed  - Monitor I&O, weight, and lab values if indicated  - Obtain nutrition services referral as needed  Outcome: Progressing     Problem: Nutrition/Hydration-ADULT  Goal: Nutrient/Hydration intake appropriate for improving, restoring or maintaining nutritional needs  Description: Monitor and assess patient's nutrition/hydration status for malnutrition. Collaborate with interdisciplinary team and initiate plan and interventions as ordered.  Monitor patient's weight and dietary intake as ordered or per policy. Utilize nutrition screening tool and intervene as necessary. Determine patient's food preferences and provide high-protein, high-caloric foods as appropriate.     INTERVENTIONS:  - Monitor oral intake, urinary output, labs, and treatment plans  - Assess nutrition and hydration status and recommend course of action  - Evaluate amount of meals eaten  - Assist patient with eating if necessary   - Allow adequate me for meals  - Recommend/ encourage appropriate diets, oral nutritional supplements, and  vitamin/mineral supplements  - Order, calculate, and assess calorie counts as needed  - Recommend, monitor, and adjust tube feedings and TPN/PPN based on assessed needs  - Assess need for intravenous fluids  - Provide specific nutrition/hydration education as appropriate  - Include patient/family/caregiver in decisions related to nutrition  Outcome: Progressing

## 2024-01-16 NOTE — ASSESSMENT & PLAN NOTE
Presented with ongoing constipation, with reported weight loss for at least a year, associated with loss of appetite, nausea and vomiting following meals since end of Dec '23. Initially seen in ED 1/6, left AMA but returned on 1/7 for further evaluation.   CT A/P: Partial colonic obstruction 2/2 7 cm long apple core lesion of mid sigmoid colon suspicious for colonic malignancy.  CEA wnl, CT chest no evidence of metastatic disease   S/p ex lap, sigmoid colectomy with Jo's procedure and repair of bladder perforation (1/9)  S/p pelvic JEO drain removal (1/11)  Recurrent nausea/vomiting overnight (1/11-1/12), NGT replaced  General surgery following,   Continue IV Ancef/Flagyl   Maintain NGT and NPO status until cleared for clamp trial  Continue IVFs, analgesics, antiemetics  Maintain Hannon catheter upon discharge with outpatient urology F/U due to perforation   Pathology showed no malignancy ,  But features of diverticulitis and stricture

## 2024-01-16 NOTE — PROGRESS NOTES
[facility]  Progress Note  Name: Davis Goss I  MRN: [id number]  Unit/Bed#: -01 I Date of Admission: 1/7/2024   Date of Service: 1/16/2024 I Hospital Day: 9    Assessment/Plan   Hypokalemia  Assessment & Plan  Hypokalemia in setting of GI issues and NPO state  K currently improved to 3.1, Mg currently 2.5  Patient is currently n.p.o. so will replace with IV and monitor       Severe protein-calorie malnutrition (HCC)  Assessment & Plan  Malnutrition Findings:   Adult Malnutrition type: Acute illness  Adult Degree of Malnutrition: Other severe protein calorie malnutrition  Malnutrition Characteristics: Muscle loss, Inadequate energy      360 Statement: Moderate acute malnutrition r/t inadequate energy intake with colonic mass as evidenced by severe muscle loss (temples), intake < 50% of estimated needs x > 5 days.  Will treat with nutrition therapy and PO diet/supplement recommendations upon diet advancement.  BMI Findings:   Body mass index is 20.54 kg/m².   Nutrition following    Elevated blood pressure reading  Assessment & Plan  Hypertensive on admission, initially thought to be 2/2 abdominal pain but BP remained persistently elevated while inpatient  No prior history of HTN per chart review  With abnormal EKG on admission, however Echo was normal  BP reviewed, improving without need for antihypertensives  Monitor BP per unit protocol  Blood Pressure: 156/87    Substance use  Assessment & Plan  Patient reports drinking liquor and beer multiple times a week, unable to specify quantity  Patient admits to occasional marijuana and cocaine use   UDS: (+) Opiates and Cocaine. Patient received IV Dilaudid and PO oxycodone inpatient prior to collection.  CIWA negative x 72 hours - D/C protocol    Smoking  Assessment & Plan  Smokes 1/2 PPD  NRT while admitted  Educated on smoking cessation    * Colonic mass  Assessment & Plan  Presented with ongoing constipation, with reported weight loss  for at least a year, associated with loss of appetite, nausea and vomiting following meals since end of Dec '23. Initially seen in ED 1/6, left AMA but returned on 1/7 for further evaluation.   CT A/P: Partial colonic obstruction 2/2 7 cm long apple core lesion of mid sigmoid colon suspicious for colonic malignancy.  CEA wnl, CT chest no evidence of metastatic disease   S/p ex lap, sigmoid colectomy with Jo's procedure and repair of bladder perforation (1/9)  S/p pelvic JOE drain removal (1/11)  Recurrent nausea/vomiting overnight (1/11-1/12), NGT replaced  General surgery following,   Continue IV Ancef/Flagyl   Maintain NGT and NPO status until cleared for clamp trial  Continue IVFs, analgesics, antiemetics  Maintain Hannon catheter upon discharge with outpatient urology F/U due to perforation   Pathology showed no malignancy ,  But features of diverticulitis and stricture              VTE Pharmacologic Prophylaxis: VTE Score: 3 Moderate Risk (Score 3-4) - Pharmacological DVT Prophylaxis Ordered: enoxaparin (Lovenox).    Mobility:   Basic Mobility Inpatient Raw Score: 18  JH-HLM Goal: 6: Walk 10 steps or more  JH-HLM Achieved: 6: Walk 10 steps or more  HLM Goal achieved. Continue to encourage appropriate mobility.    Patient Centered Rounds: I performed bedside rounds with nursing staff today.   Discussions with Specialists or Other Care Team Provider: d/w general surgery team     Education and Discussions with Family / Patient: Patient declined call to .     Total Time Spent on Date of Encounter in care of patient: 45 mins. This time was spent on one or more of the following: performing physical exam; counseling and coordination of care; obtaining or reviewing history; documenting in the medical record; reviewing/ordering tests, medications or procedures; communicating with other healthcare professionals and discussing with patient's family/caregivers.    Current Length of Stay: 9 day(s)  Current  Patient Status: Inpatient   Certification Statement: The patient will continue to require additional inpatient hospital stay due to status post partial hemicolectomy,  Discharge Plan: Anticipate discharge in 48-72 hrs to home.    Code Status: Level 1 - Full Code    Subjective:   Pt seen and examined by me ,pt feels better ,pt tolerating po fluids , has abdominal discomfort     Objective:     Vitals:   Temp (24hrs), Av °F (36.7 °C), Min:97.2 °F (36.2 °C), Max:98.9 °F (37.2 °C)    Temp:  [97.2 °F (36.2 °C)-98.9 °F (37.2 °C)] 97.2 °F (36.2 °C)  HR:  [84-94] 91  Resp:  [19-21] 19  BP: (143-156)/() 143/97  SpO2:  [97 %-100 %] 97 %  Body mass index is 20.54 kg/m².     Input and Output Summary (last 24 hours):     Intake/Output Summary (Last 24 hours) at 2024  Last data filed at 2024 1701  Gross per 24 hour   Intake 1236.35 ml   Output 2050 ml   Net -813.65 ml       Physical Exam:   Physical Exam   HEENT-PERRLA, moist oral mucosa  Neck-supple, no JVD elevation   Respiratory-equal air entry bilaterally, no rales or rhonchi  Cardiovascular system-S1, S2 heard, no murmur or gallops or rubs  Abdomen-soft, nontender, no guarding or rigidity, bowel sounds heard, surgical site bandaged , colostomy insitu  Extremities-no pedal edema  Peripheral pulses palpable  Musculoskeletal-no contractures  Central nervous system-no acute focal neurological deficit ,no sensory or motor deficit noted.  Skin-no rash noted       Additional Data:     Labs:  Results from last 7 days   Lab Units 24  0524   WBC Thousand/uL 15.64*   HEMOGLOBIN g/dL 11.2*   HEMATOCRIT % 34.8*   PLATELETS Thousands/uL 397*   NEUTROS PCT % 79*   LYMPHS PCT % 8*   MONOS PCT % 8   EOS PCT % 4     Results from last 7 days   Lab Units 24  0524   SODIUM mmol/L 142   POTASSIUM mmol/L 3.8   CHLORIDE mmol/L 103   CO2 mmol/L 33*   BUN mg/dL 20   CREATININE mg/dL 0.81   ANION GAP mmol/L 6   CALCIUM mg/dL 8.4   ALBUMIN g/dL 2.8*   TOTAL BILIRUBIN  mg/dL 0.45   ALK PHOS U/L 43   ALT U/L 15   AST U/L 27   GLUCOSE RANDOM mg/dL 109         Results from last 7 days   Lab Units 01/13/24  0717   POC GLUCOSE mg/dl 144*               Lines/Drains:  Invasive Devices       Peripheral Intravenous Line  Duration             Peripheral IV 01/13/24 Right;Ventral (anterior) Forearm 3 days    Long-Dwell Peripheral IV (Midline) 01/16/24 Right Basilic <1 day              Drain  Duration             Closed/Suction Drain Lateral;Right Abdomen Bulb 15 Fr. 7 days    Closed/Suction Drain Right Abdomen Bulb 15 Fr. 7 days    Colostomy LLQ 7 days    Urethral Catheter Latex 16 Fr. 7 days    NG/OG/Enteral Tube Nasogastric 16 Fr Right nare 4 days                  Urinary Catheter:  Goal for removal: N/A- Discharging with Hannon               Imaging: Personally reviewed the following imaging: abdominal/pelvic CT    Recent Cultures (last 7 days):         Last 24 Hours Medication List:   Current Facility-Administered Medications   Medication Dose Route Frequency Provider Last Rate    acetaminophen  650 mg Oral Q6H PENELOPE Surekha Michelle PA-C      amoxicillin-clavulanate  1 tablet Oral Q12H PENELOPE Surekha Michelle PA-C      dextrose 5 % and sodium chloride 0.9 %  125 mL/hr Intravenous Continuous Jolie Heller  mL/hr (01/16/24 1411)    diphenhydrAMINE  25 mg Intravenous Q6H PRN Surekha Michelle PA-C      enoxaparin  40 mg Subcutaneous Daily Surekha Michelle PA-C      hydrALAZINE  10 mg Intravenous Q6H PRN Shaun Sidhu DO      HYDROmorphone  0.5 mg Intravenous Q6H PRN Sandra Alexander PA-C      iohexol  50 mL Oral Once in imaging Annmarie Redding MD      metoclopramide  5 mg Intravenous Q6H PRN Surekha Michelle PA-C      naloxone  0.04 mg Intravenous Q1MIN PRN Surekha Michelle PA-C      naloxone  0.04 mg Intravenous Q3 min PRN Surekha Michelle PA-C      nicotine  1 patch Transdermal Daily Surekha Michelle PA-C      ondansetron  4 mg Intravenous Q6H PRN Surekha Michelle PA-C      oxyCODONE  10 mg Oral  Q6H GABRIELLA Alexander PA-C          Today, Patient Was Seen By: Jolie Heller MD    **Please Note: This note may have been constructed using a voice recognition system.**

## 2024-01-16 NOTE — PHYSICAL THERAPY NOTE
PHYSICAL THERAPY Treatment  DATE: 01/16/24  TIME: 3415-3304    NAME:  Davis Goss  AGE:   53 y.o.  Mrn:   [id number]  Length Of Stay: 9    ADMIT DX:  Colonic obstruction (HCC) [K56.609]  Constipation [K59.00]  Colonic mass [K63.89]    History reviewed. No pertinent past medical history.  Past Surgical History:   Procedure Laterality Date    HARTMANS PROCEDURE N/A 1/9/2024    Procedure: EXPLORATORY LAPAROTOMY, SIGMOID COLECTOMY AND COLOSTOMY REPAIR OF BLADDER PERFORATION  HARTMANS PROCEDURE;  Surgeon: Robles Cheek MD;  Location:  MAIN OR;  Service: General    KNEE SURGERY Right         01/16/24 1311   PT Last Visit   PT Visit Date 01/16/24   Note Type   Note Type Treatment   Pain Assessment   Pain Assessment Tool 0-10   Pain Score No Pain   Restrictions/Precautions   Weight Bearing Precautions Per Order No   Other Precautions Fall Risk;Pain;Bed Alarm;Multiple lines   General   Chart Reviewed Yes  (S/p Pelvic JOE drain removed on 1/11.  Mid-line placed 1/16.)   Additional Pertinent History 54 y/o presents due to constipation, abdominal pain/distention, weight loss, N/V, no BM for 3 weeks. Dx with bowel obstruction and sigmoid mass, but left AMA. Pt returned the next day for re-evaluation. Upon assessment: tachycardic, HTN. Dx: colonic obstruction. Pt underwent ex lap, sigmoid colectomy, colostomy, bladder repair, Jo's procedure on 1/9.   Family/Caregiver Present No   Cognition   Overall Cognitive Status WFL   Arousal/Participation Alert   Orientation Level Oriented X4   Subjective   Subjective Pt pleasant and agreeable to ambulate with therapy.  No significant c/o symptoms   Bed Mobility   Supine to Sit 5  Supervision   Additional items Increased time required;Verbal cues;Bedrails;HOB elevated   Sit to Supine 5  Supervision   Additional items HOB elevated;Bedrails;Increased time required;Verbal cues   Transfers   Sit to Stand 5  Supervision   Additional items Verbal cues;Increased time required;Bedrails    Stand to Sit 5  Supervision   Additional items Bedrails;Increased time required;Verbal cues   Ambulation/Elevation   Gait Assistance 5  Supervision   Additional items Verbal cues   Assistive Device 4-wheeled walker   Distance 100'x2   Ambulation/Elevation Additional Comments Pt ambulated hallway with rollator using reciprocal gait.  cues for walker management and posture.  Pt required prolonged seated rest break due to fatigue and discomfort before continuing.   Balance   Static Sitting Fair +   Dynamic Sitting Fair +   Static Standing Fair +   Dynamic Standing Fair   Ambulatory Fair  (rollator)   Endurance Deficit   Endurance Deficit Yes   Endurance Deficit Description generalized fatigue with ambulating community distances   Activity Tolerance   Activity Tolerance Patient limited by fatigue;Patient limited by pain   Assessment   Prognosis Fair   Problem List Impaired balance;Decreased strength;Decreased mobility;Decreased safety awareness;Pain   Assessment Pt exhibits physical deficits noted in problem list above.  Deficits listed contribute to functional limitations that are significant from the patient PLOF and include: difficulty with bed mobility, impaired standing balance, tolerance for OOB functional activities, unsafe/inefficient ambulation, fall risk, and unable to safely manage stairs/steps/ramp    During today's session, reviewed bed mobility and transfers.  Educated and instructed pt on walker use and postural corrections.  Gait corrections as appropriate.  Progressed distance and duration of ambulation.     The AM-PAC & Barthel Index outcome tools were used to assist in determining pt safety w/ mobility/self care & appropriate d/c recommendations, see above for scores. Patient's clinical presentation is evolving due to recent surgery/procedure and ongoing medical management needs.     Considering the patient's PLOF, co-morbidities, acute functional limitations, functional outcome measures, and/or  goal to progress functional independence; this patient would benefit from skilled Physical Therapy intervention in the acute care setting.   Barriers to Discharge None   Goals   Patient Goals improve functional mobility/independence   STG Expiration Date 01/20/24   Short Term Goal #1 1: Pt will perform rolling to either side in flat bed, independently. 2: Pt will perform sit<>supine on flat bed, independently. 3: Pt will perform stand pivot transfer without AD, independently. 4: Pt will ambulate 300' without AD, independently. 5: Pt will perform written HEP, without cues. 6: Pt will ascend/descend 1 flight of stairs with hand rail, mod I.   Plan   Treatment/Interventions Functional transfer training;LE strengthening/ROM;Elevations;Therapeutic exercise;Endurance training;Patient/family training;Equipment eval/education;Bed mobility;Gait training   PT Frequency 2-3x/wk   Discharge Recommendation   Rehab Resource Intensity Level, PT III (Minimum Resource Intensity)   AM-PAC Basic Mobility Inpatient   Turning in Flat Bed Without Bedrails 3   Lying on Back to Sitting on Edge of Flat Bed Without Bedrails 3   Moving Bed to Chair 3   Standing Up From Chair Using Arms 3   Walk in Room 3   Climb 3-5 Stairs With Railing 3   Basic Mobility Inpatient Raw Score 18   Basic Mobility Standardized Score 41.05   Highest Level Of Mobility   JH-HLM Goal 6: Walk 10 steps or more   JH-HLM Achieved 7: Walk 25 feet or more   Education   Education Provided Mobility training;Assistive device   Patient Demonstrates acceptance/verbal understanding   End of Consult   Patient Position at End of Consult Supine;Bed/Chair alarm activated;All needs within reach     The patient's AM-PAC Basic Mobility Inpatient Short Form Raw Score is 18. A Raw score of greater than or equal to 16 suggests the patient may benefit from discharge to home. Please also refer to the recommendation of the Physical Therapist for safe discharge planning.    Hussain  Bernard, PT, DPT  [place] Licensure #[id number]

## 2024-01-16 NOTE — PROGRESS NOTES
Progress Note - General Surgery   Davis Goss 53 y.o. male MRN: [id number]  Unit/Bed#: -01 Encounter: [id number]    ASSESSMENT:  52 y/o male with PMH polysubstance abuse (tobacco use, occasional cocaine use, ETOH use) p/w LBO, now POD7 s/p Jo's procedure, repair of bladder perforation  Post op wound abscess  -AF, VSS, normal effort on RA  -Leukocytosis WBC 15.64  -Anemia, Hgb 11.2 (12.9) likely 2/2 hemodilution   -Cr 0.81 (1.14, 1.22, 1.03) improved  -K improved today 3.8  -NG OP 500cc  -UOP 1.6L benson in place, urine slightly bloody   -Ostomy , colostomy pink, viable, fx  -JOE x 1 dark sanguinous fluid   -NGT clamp trial successful, no further n/v or abd distension, scant residual  -CTAP 1/15 showed pancolitis likely infectious and moderately diffuse SB dilation consistent with ileus   -Midline laparotomy wound evaluated. The inferior portion of the wound staples were taken out and wound was probed with Q tip. There was a moderate amount of sanguinous/purulent drainage. The wound was approx 1cm deep and tunneled 4 cm superiorly. This opening was packed with iodoform guaze and was covered with 2x2/ tegaderm to seal and avoid any contamination with stool.     PLAN:  -IR consulted for insertion of midline for access and repletion of K   -NGT removed, trial clears  -MIVF, decrease to 100cc/hr  -Received total 7 days abx for contamination in OR, d/c. Start PO Augmentin for wound infection.   -Replete elytes prn   -Recheck Phos   -Trend fever curse, wbc, elytes  -I/Os  -Nicotine patch while inpatient. Smoking cessation education.   -DVT ppx, Lovenox  -IV analgesia prn  -Encourage use of IS  -Encourage OOB ambulation   -Maintain benson catheter at all times. Flush with sterile saline q 4 hours.   -JOE drain care, change dressing daily, strip tubing q shift   -PT/OT  -Dispo planning: home with [facility]  -Appreciate primary team medical mangement      SUBJECTIVE:  Per pt abdominal pain related to incision here  "and there, overall controlled. No n/v. No abdominal bloating. Ostomy fx. No f/c. Denies sob, cp.     OBJECTIVE:   Vitals:  Blood pressure 144/95, pulse 84, temperature 98.9 °F (37.2 °C), temperature source Axillary, resp. rate 21, height 5' 11\" (1.803 m), weight 66.8 kg (147 lb 4.3 oz), SpO2 97%.,Body mass index is 20.54 kg/m².    I/Os:    Intake/Output Summary (Last 24 hours) at 1/16/2024 0722  Last data filed at 1/16/2024 0649  Gross per 24 hour   Intake 1136.35 ml   Output 2775 ml   Net -1638.65 ml       Lines/Drains:  Invasive Devices       Peripheral Intravenous Line  Duration             Peripheral IV 01/09/24 Left;Upper;Ventral (anterior) Arm 6 days    Peripheral IV 01/13/24 Right;Ventral (anterior) Forearm 3 days              Drain  Duration             Colostomy LLQ 7 days    Closed/Suction Drain Lateral;Right Abdomen Bulb 15 Fr. 6 days    Closed/Suction Drain Right Abdomen Bulb 15 Fr. 6 days    Urethral Catheter Latex 16 Fr. 6 days    NG/OG/Enteral Tube Nasogastric 16 Fr Right nare 4 days                    Physical Exam  Vitals reviewed.   Constitutional:       General: He is not in acute distress.     Appearance: He is not toxic-appearing.   HENT:      Head: Normocephalic and atraumatic.   Cardiovascular:      Rate and Rhythm: Normal rate and regular rhythm.      Heart sounds: No murmur heard.  Pulmonary:      Effort: Pulmonary effort is normal.      Breath sounds: No wheezing, rhonchi or rales.   Abdominal:      General: Abdomen is flat. Bowel sounds are normal. There is no distension.      Palpations: Abdomen is soft.      Tenderness: There is no abdominal tenderness. There is no guarding or rebound.          Comments: Laparotomy incision with staples CDI overlying mepilex with scant drainage  Left sided Colostomy pink viable functioning with gas and stool in bag  JOE drain Right side of abdomen draining dark sanguinous fluid  Green [place]= area opened up and purulent fluid out   Genitourinary:     " Comments: Hannon catheter in place draining clear yellow urine  Musculoskeletal:      Cervical back: Neck supple.      Right lower leg: No edema.      Left lower leg: No edema.   Skin:     General: Skin is warm and dry.      Capillary Refill: Capillary refill takes less than 2 seconds.   Neurological:      General: No focal deficit present.      Mental Status: He is alert.   Psychiatric:         Mood and Affect: Mood normal.         Thought Content: Thought content normal.         Judgment: Judgment normal.         Diagnostics:  I have personally reviewed pertinent lab results.     XR chest portable    Result Date: 1/12/2024  Impression: Nasogastric tube courses below the level of the diaphragm and projects over the stomach. No acute cardiopulmonary pathology. Dilated loops of air-filled small bowel are noted within the partially visualized abdomen. Consider follow-up with dedicated abdominal imaging as clinically warranted. Resident: REBEKA RILEY I, the attending radiologist, have reviewed the images and agree with the final report above. Workstation performed: [id number]     CT chest w contrast    Result Date: 1/9/2024  Impression: No findings suspicious for metastatic disease. Nonemergent findings above. Workstation performed: [id number]     XR chest pa & lateral    Result Date: 1/8/2024  Impression: Right base infiltrate The study was marked in EPIC for immediate notification. Workstation performed: [id number]     Recent Results (from the past 36 hour(s))   CBC and differential    Collection Time: 01/15/24  5:28 AM   Result Value Ref Range    WBC 13.73 (H) 4.31 - 10.16 Thousand/uL    RBC 4.63 3.88 - 5.62 Million/uL    Hemoglobin 12.9 12.0 - 17.0 g/dL    Hematocrit 39.7 36.5 - 49.3 %    MCV 86 82 - 98 fL    MCH 27.9 26.8 - 34.3 pg    MCHC 32.5 31.4 - 37.4 g/dL    RDW 15.2 (H) 11.6 - 15.1 %    MPV 10.0 8.9 - 12.7 fL    Platelets 407 (H) 149 - 390 Thousands/uL    nRBC 0 /100 WBCs    Neutrophils Relative 78  (H) 43 - 75 %    Immat GRANS % 1 0 - 2 %    Lymphocytes Relative 8 (L) 14 - 44 %    Monocytes Relative 10 4 - 12 %    Eosinophils Relative 3 0 - 6 %    Basophils Relative 0 0 - 1 %    Neutrophils Absolute 10.81 (H) 1.85 - 7.62 Thousands/µL    Immature Grans Absolute 0.07 0.00 - 0.20 Thousand/uL    Lymphocytes Absolute 1.11 0.60 - 4.47 Thousands/µL    Monocytes Absolute 1.33 (H) 0.17 - 1.22 Thousand/µL    Eosinophils Absolute 0.36 0.00 - 0.61 Thousand/µL    Basophils Absolute 0.05 0.00 - 0.10 Thousands/µL   Basic metabolic panel    Collection Time: 01/15/24  5:28 AM   Result Value Ref Range    Sodium 148 (H) 135 - 147 mmol/L    Potassium 3.1 (L) 3.5 - 5.3 mmol/L    Chloride 97 96 - 108 mmol/L    CO2 45 (H) 21 - 32 mmol/L    ANION GAP 6 mmol/L    BUN 23 5 - 25 mg/dL    Creatinine 1.14 0.60 - 1.30 mg/dL    Glucose 136 65 - 140 mg/dL    Calcium 8.7 8.4 - 10.2 mg/dL    eGFR 74 ml/min/1.73sq m   Magnesium    Collection Time: 01/15/24  5:28 AM   Result Value Ref Range    Magnesium 2.5 1.9 - 2.7 mg/dL   Phosphorus    Collection Time: 01/15/24  5:28 AM   Result Value Ref Range    Phosphorus 2.6 (L) 2.7 - 4.5 mg/dL   Triglycerides    Collection Time: 01/15/24  5:28 AM   Result Value Ref Range    Triglycerides 139 See Comment mg/dL   CBC and differential    Collection Time: 01/16/24  5:24 AM   Result Value Ref Range    WBC 15.64 (H) 4.31 - 10.16 Thousand/uL    RBC 4.00 3.88 - 5.62 Million/uL    Hemoglobin 11.2 (L) 12.0 - 17.0 g/dL    Hematocrit 34.8 (L) 36.5 - 49.3 %    MCV 87 82 - 98 fL    MCH 28.0 26.8 - 34.3 pg    MCHC 32.2 31.4 - 37.4 g/dL    RDW 15.2 (H) 11.6 - 15.1 %    MPV 11.8 8.9 - 12.7 fL    Platelets 397 (H) 149 - 390 Thousands/uL    nRBC 0 /100 WBCs    Neutrophils Relative 79 (H) 43 - 75 %    Immat GRANS % 1 0 - 2 %    Lymphocytes Relative 8 (L) 14 - 44 %    Monocytes Relative 8 4 - 12 %    Eosinophils Relative 4 0 - 6 %    Basophils Relative 0 0 - 1 %    Neutrophils Absolute 12.44 (H) 1.85 - 7.62 Thousands/µL     Immature Grans Absolute 0.09 0.00 - 0.20 Thousand/uL    Lymphocytes Absolute 1.23 0.60 - 4.47 Thousands/µL    Monocytes Absolute 1.25 (H) 0.17 - 1.22 Thousand/µL    Eosinophils Absolute 0.56 0.00 - 0.61 Thousand/µL    Basophils Absolute 0.07 0.00 - 0.10 Thousands/µL       Current Medications:  Scheduled Meds:  Current Facility-Administered Medications   Medication Dose Route Frequency Provider Last Rate    acetaminophen  1,000 mg Intravenous Q6H [facility] Surekha Michelle PA-C Stopped (01/16/24 0400)    cefazolin  1,000 mg Intravenous Q8H Surekha Michelle PA-C 100 mL/hr at 01/16/24 0649    dextrose 5 % and sodium chloride 0.9 % with KCl 20 mEq/L  150 mL/hr Intravenous Continuous David Weinstein  mL/hr (01/16/24 0649)    diphenhydrAMINE  25 mg Intravenous Q6H PRN Surekha Michelle PA-C      enoxaparin  40 mg Subcutaneous Daily Surekha Michelle PA-C      hydrALAZINE  10 mg Intravenous Q6H PRN Shaun Sidhu DO      HYDROmorphone  0.5 mg Intravenous Q6H PRN Sandra Alexander PA-C      iohexol  50 mL Oral Once in imaging Annmarie Redding MD      metoclopramide  5 mg Intravenous Q6H PRN Surekha Michelle PA-C      metroNIDAZOLE  500 mg Intravenous Q8H Surekha Michelle PA-C 200 mL/hr at 01/16/24 0649    naloxone  0.04 mg Intravenous Q1MIN PRN Surekha Michelle PA-C      naloxone  0.04 mg Intravenous Q3 min PRN Surekha Michelle PA-C      nicotine  1 patch Transdermal Daily Surekha Michelle PA-C      ondansetron  4 mg Intravenous Q6H PRN Surekha Michelle PA-C      oxyCODONE  10 mg Oral Q6H PRN Sandra Alexander PA-C       Continuous Infusions:dextrose 5 % and sodium chloride 0.9 % with KCl 20 mEq/L, 150 mL/hr, Last Rate: 150 mL/hr (01/16/24 0649)      PRN Meds:    diphenhydrAMINE    hydrALAZINE    HYDROmorphone    iohexol    metoclopramide    naloxone    naloxone    ondansetron    oxyCODONE      Surekha Michelle PA-C  1/16/2024

## 2024-01-16 NOTE — PHYSICAL THERAPY NOTE
Physical Therapy Cancellation Note       01/16/24 0941   PT Last Visit   PT Visit Date 01/16/24   Note Type   Note type Cancelled Session   Cancel Reasons Patient off floor/test   Additional Comments Pt currently off the floor to have a mid-line placed.

## 2024-01-16 NOTE — OCCUPATIONAL THERAPY NOTE
Occupational TherapyCx Note     Patient Name: Davis Goss  Today's Date: 1/16/2024  Problem List  Principal Problem:    Colonic mass  Active Problems:    Smoking    Substance use    Elevated blood pressure reading    Severe protein-calorie malnutrition (HCC)    Hypokalemia            01/16/24 1543   OT Last Visit   OT Visit Date 01/16/24   Note Type   Note type Cancelled Session   Additional Comments Attempted to see pt for OT treatment session. However, pt currently in deep sleep. Attempted to awake, however to no avail. Will attempt to follow at later time as able.             Cathy Archibald, OTR/L

## 2024-01-16 NOTE — PLAN OF CARE
Problem: PHYSICAL THERAPY ADULT  Goal: Performs mobility at highest level of function for planned discharge setting.  See evaluation for individualized goals.  Description: Treatment/Interventions: Functional transfer training, LE strengthening/ROM, Elevations, Therapeutic exercise, Endurance training, Patient/family training, Equipment eval/education, Bed mobility, Gait training          See flowsheet documentation for full assessment, interventions and recommendations.  Outcome: Progressing  Note: Prognosis: Fair  Problem List: Impaired balance, Decreased strength, Decreased mobility, Decreased safety awareness, Pain  Assessment: Pt exhibits physical deficits noted in problem list above.  Deficits listed contribute to functional limitations that are significant from the patient PLOF and include: difficulty with bed mobility, impaired standing balance, tolerance for OOB functional activities, unsafe/inefficient ambulation, fall risk, and unable to safely manage stairs/steps/ramp    During today's session, reviewed bed mobility and transfers.  Educated and instructed pt on walker use and postural corrections.  Gait corrections as appropriate.  Progressed distance and duration of ambulation.     The AM-PAC & Barthel Index outcome tools were used to assist in determining pt safety w/ mobility/self care & appropriate d/c recommendations, see above for scores. Patient's clinical presentation is evolving due to recent surgery/procedure and ongoing medical management needs.     Considering the patient's PLOF, co-morbidities, acute functional limitations, functional outcome measures, and/or goal to progress functional independence; this patient would benefit from skilled Physical Therapy intervention in the acute care setting.  Barriers to Discharge: None     Rehab Resource Intensity Level, PT: III (Minimum Resource Intensity)    See flowsheet documentation for full assessment.

## 2024-01-17 PROBLEM — K51.00 PANCOLITIS (HCC): Status: ACTIVE | Noted: 2024-01-17

## 2024-01-17 PROBLEM — K56.609 LARGE BOWEL OBSTRUCTION (HCC): Status: ACTIVE | Noted: 2024-01-07

## 2024-01-17 LAB
ANION GAP SERPL CALCULATED.3IONS-SCNC: 6 MMOL/L
BASOPHILS # BLD AUTO: 0.05 THOUSANDS/ÂΜL (ref 0–0.1)
BASOPHILS NFR BLD AUTO: 0 % (ref 0–1)
BUN SERPL-MCNC: 18 MG/DL (ref 5–25)
CALCIUM SERPL-MCNC: 7.6 MG/DL (ref 8.4–10.2)
CHLORIDE SERPL-SCNC: 110 MMOL/L (ref 96–108)
CO2 SERPL-SCNC: 25 MMOL/L (ref 21–32)
CREAT SERPL-MCNC: 0.71 MG/DL (ref 0.6–1.3)
EOSINOPHIL # BLD AUTO: 0.44 THOUSAND/ÂΜL (ref 0–0.61)
EOSINOPHIL NFR BLD AUTO: 3 % (ref 0–6)
ERYTHROCYTE [DISTWIDTH] IN BLOOD BY AUTOMATED COUNT: 15 % (ref 11.6–15.1)
GFR SERPL CREATININE-BSD FRML MDRD: 108 ML/MIN/1.73SQ M
GLUCOSE SERPL-MCNC: 341 MG/DL (ref 65–140)
HCT VFR BLD AUTO: 33 % (ref 36.5–49.3)
HGB BLD-MCNC: 10.7 G/DL (ref 12–17)
IMM GRANULOCYTES # BLD AUTO: 0.06 THOUSAND/UL (ref 0–0.2)
IMM GRANULOCYTES NFR BLD AUTO: 0 % (ref 0–2)
LYMPHOCYTES # BLD AUTO: 1.34 THOUSANDS/ÂΜL (ref 0.6–4.47)
LYMPHOCYTES NFR BLD AUTO: 10 % (ref 14–44)
MAGNESIUM SERPL-MCNC: 1.8 MG/DL (ref 1.9–2.7)
MCH RBC QN AUTO: 27.9 PG (ref 26.8–34.3)
MCHC RBC AUTO-ENTMCNC: 32.4 G/DL (ref 31.4–37.4)
MCV RBC AUTO: 86 FL (ref 82–98)
MONOCYTES # BLD AUTO: 0.93 THOUSAND/ÂΜL (ref 0.17–1.22)
MONOCYTES NFR BLD AUTO: 7 % (ref 4–12)
NEUTROPHILS # BLD AUTO: 10.62 THOUSANDS/ÂΜL (ref 1.85–7.62)
NEUTS SEG NFR BLD AUTO: 80 % (ref 43–75)
NRBC BLD AUTO-RTO: 0 /100 WBCS
PHOSPHATE SERPL-MCNC: 2.2 MG/DL (ref 2.7–4.5)
PLATELET # BLD AUTO: 457 THOUSANDS/UL (ref 149–390)
PMV BLD AUTO: 10.5 FL (ref 8.9–12.7)
POTASSIUM SERPL-SCNC: 3.2 MMOL/L (ref 3.5–5.3)
RBC # BLD AUTO: 3.84 MILLION/UL (ref 3.88–5.62)
SODIUM SERPL-SCNC: 141 MMOL/L (ref 135–147)
WBC # BLD AUTO: 13.44 THOUSAND/UL (ref 4.31–10.16)

## 2024-01-17 PROCEDURE — 85025 COMPLETE CBC W/AUTO DIFF WBC: CPT | Performed by: PHYSICIAN ASSISTANT

## 2024-01-17 PROCEDURE — 99232 SBSQ HOSP IP/OBS MODERATE 35: CPT | Performed by: INTERNAL MEDICINE

## 2024-01-17 PROCEDURE — 97530 THERAPEUTIC ACTIVITIES: CPT

## 2024-01-17 PROCEDURE — 99024 POSTOP FOLLOW-UP VISIT: CPT | Performed by: PHYSICIAN ASSISTANT

## 2024-01-17 PROCEDURE — 83735 ASSAY OF MAGNESIUM: CPT | Performed by: PHYSICIAN ASSISTANT

## 2024-01-17 PROCEDURE — 80048 BASIC METABOLIC PNL TOTAL CA: CPT | Performed by: PHYSICIAN ASSISTANT

## 2024-01-17 PROCEDURE — 84100 ASSAY OF PHOSPHORUS: CPT | Performed by: PHYSICIAN ASSISTANT

## 2024-01-17 RX ORDER — HYDROMORPHONE HCL/PF 1 MG/ML
0.5 SYRINGE (ML) INJECTION EVERY 6 HOURS PRN
Status: DISCONTINUED | OUTPATIENT
Start: 2024-01-17 | End: 2024-01-19

## 2024-01-17 RX ORDER — POTASSIUM CHLORIDE 20MEQ/15ML
40 LIQUID (ML) ORAL ONCE
Status: COMPLETED | OUTPATIENT
Start: 2024-01-17 | End: 2024-01-17

## 2024-01-17 RX ORDER — HYDRALAZINE HYDROCHLORIDE 20 MG/ML
10 INJECTION INTRAMUSCULAR; INTRAVENOUS EVERY 6 HOURS PRN
Status: DISCONTINUED | OUTPATIENT
Start: 2024-01-17 | End: 2024-01-20 | Stop reason: HOSPADM

## 2024-01-17 RX ORDER — MAGNESIUM SULFATE HEPTAHYDRATE 40 MG/ML
2 INJECTION, SOLUTION INTRAVENOUS ONCE
Status: COMPLETED | OUTPATIENT
Start: 2024-01-17 | End: 2024-01-17

## 2024-01-17 RX ORDER — POTASSIUM CHLORIDE 14.9 MG/ML
20 INJECTION INTRAVENOUS ONCE
Status: COMPLETED | OUTPATIENT
Start: 2024-01-17 | End: 2024-01-17

## 2024-01-17 RX ORDER — POTASSIUM CHLORIDE 14.9 MG/ML
20 INJECTION INTRAVENOUS
Status: DISCONTINUED | OUTPATIENT
Start: 2024-01-17 | End: 2024-01-17

## 2024-01-17 RX ADMIN — ACETAMINOPHEN 650 MG: 325 TABLET, FILM COATED ORAL at 17:41

## 2024-01-17 RX ADMIN — OXYCODONE HYDROCHLORIDE 10 MG: 10 TABLET ORAL at 16:23

## 2024-01-17 RX ADMIN — POTASSIUM PHOSPHATE, MONOBASIC AND POTASSIUM PHOSPHATE, DIBASIC 21 MMOL: 224; 236 INJECTION, SOLUTION, CONCENTRATE INTRAVENOUS at 13:15

## 2024-01-17 RX ADMIN — DEXTROSE AND SODIUM CHLORIDE 75 ML/HR: 5; .9 INJECTION, SOLUTION INTRAVENOUS at 23:44

## 2024-01-17 RX ADMIN — POTASSIUM CHLORIDE 20 MEQ: 14.9 INJECTION, SOLUTION INTRAVENOUS at 11:05

## 2024-01-17 RX ADMIN — POTASSIUM CHLORIDE 40 MEQ: 1.5 SOLUTION ORAL at 13:05

## 2024-01-17 RX ADMIN — ACETAMINOPHEN 650 MG: 325 TABLET, FILM COATED ORAL at 23:40

## 2024-01-17 RX ADMIN — MAGNESIUM SULFATE HEPTAHYDRATE 2 G: 40 INJECTION, SOLUTION INTRAVENOUS at 13:26

## 2024-01-17 RX ADMIN — ACETAMINOPHEN 650 MG: 325 TABLET, FILM COATED ORAL at 05:00

## 2024-01-17 RX ADMIN — AMOXICILLIN AND CLAVULANATE POTASSIUM 1 TABLET: 875; 125 TABLET, FILM COATED ORAL at 13:07

## 2024-01-17 RX ADMIN — DEXTROSE AND SODIUM CHLORIDE 125 ML/HR: 5; .9 INJECTION, SOLUTION INTRAVENOUS at 11:05

## 2024-01-17 RX ADMIN — ENOXAPARIN SODIUM 40 MG: 40 INJECTION SUBCUTANEOUS at 13:04

## 2024-01-17 RX ADMIN — ACETAMINOPHEN 650 MG: 325 TABLET, FILM COATED ORAL at 13:04

## 2024-01-17 NOTE — PROGRESS NOTES
[facility]  Progress Note  Name: Davis Goss I  MRN: [id number]  Unit/Bed#: -01 I Date of Admission: 1/7/2024   Date of Service: 1/17/2024 I Hospital Day: 10    Assessment/Plan   * Large bowel obstruction (HCC)  Assessment & Plan  Status post Jo's procedure and repair of bladder perforation on 1/9/24: Postop day 8.  General surgery following.  NG tube has been removed.  JOE drain removed on 1/11/2024.  Has been started on a clear liquid diet.  Associated electrolyte abnormalities, being repleted  Previously on Ancef and Flagyl, now transition to Augmentin  Currently on IV fluids D5 half-normal saline at 125 cc/h  CT A/P: Partial colonic obstruction 2/2 7 cm long apple core lesion of mid sigmoid colon suspicious for colonic malignancy.  CEA wnl, CT chest no evidence of metastatic disease   Cytology negative for malignancy  Follow surgical recommendations  Recommendations are to continue Hannon at discharge    Pancolitis (HCC)  Assessment & Plan  As per surgical notes  Patient has had 8 days of IV antibiotics  Now transition to p.o. Augmentin day 2  Management per general surgery    Hypokalemia  Assessment & Plan  Levels low at 3.2: Giving 20 mEq x 2 p.o. solution and 1 dose of IV 20 IV  Associated hypomagnesemia of 1.8, giving 2 g IV  Associated low phosphatemia-giving 21 mmol IV  Recheck electrolytes in a.m.      Severe protein-calorie malnutrition (HCC)  Assessment & Plan  Malnutrition Findings:   Adult Malnutrition type: Acute illness  Adult Degree of Malnutrition: Other severe protein calorie malnutrition  Malnutrition Characteristics: Muscle loss, Inadequate energy      360 Statement: Moderate acute malnutrition r/t inadequate energy intake with colonic mass as evidenced by severe muscle loss (temples), intake < 50% of estimated needs x > 5 days.  Will treat with nutrition therapy and PO diet/supplement recommendations upon diet advancement.  BMI Findings:   Body mass index is  "21.52 kg/m².   Nutrition following    Elevated blood pressure reading  Assessment & Plan  /96 (BP Location: Left arm)   Pulse (!) 106   Temp 97.9 °F (36.6 °C) (Oral)   Resp (!) 24   Ht 5' 11\" (1.803 m)   Wt 70 kg (154 lb 5.2 oz)   SpO2 99%   BMI 21.52 kg/m²   Elevated pressures but acceptable  Echo reported to be normal  Currently not on any antihypertensive agents  Use as needed hydralazine for systolic blood pressure greater than 170      Substance use  Assessment & Plan  Patient reports drinking liquor and beer multiple times a week, unable to specify quantity  Patient admits to occasional marijuana and cocaine use   UDS: (+) Opiates and Cocaine. Patient received IV Dilaudid and PO oxycodone inpatient prior to collection.  CIWA negative was negative for 72 hours - D/C protocol per previous provider    Smoking  Assessment & Plan  Smokes 1/2 PPD  NRT while admitted  Educated on smoking cessation               VTE Pharmacologic Prophylaxis: VTE Score: 3 Moderate Risk (Score 3-4) - Pharmacological DVT Prophylaxis Ordered: enoxaparin (Lovenox).    Mobility:   Basic Mobility Inpatient Raw Score: 18  JH-HLM Goal: 6: Walk 10 steps or more  JH-HLM Achieved: 6: Walk 10 steps or more  HLM Goal achieved. Continue to encourage appropriate mobility.    Patient Centered Rounds: I performed bedside rounds with nursing staff today.   Discussions with Specialists or Other Care Team Provider: General surgery    Education and Discussions with Family / Patient: Patient declined call to .     Total Time Spent on Date of Encounter in care of patient: 45 mins. This time was spent on one or more of the following: performing physical exam; counseling and coordination of care; obtaining or reviewing history; documenting in the medical record; reviewing/ordering tests, medications or procedures; communicating with other healthcare professionals and discussing with patient's family/caregivers.    Current Length of " Stay: 10 day(s)  Current Patient Status: Inpatient   Certification Statement: The patient will continue to require additional inpatient hospital stay due to s/p  hartmans procedure   Discharge Plan: Anticipate discharge in 24-48 hrs to home.    Code Status: Level 1 - Full Code    Subjective:   Pt feels better , tolerating liquids , denies of abdominal pain ,    Objective:     Vitals:   Temp (24hrs), Av.7 °F (36.5 °C), Min:97.2 °F (36.2 °C), Max:98.1 °F (36.7 °C)    Temp:  [97.2 °F (36.2 °C)-98.1 °F (36.7 °C)] 97.9 °F (36.6 °C)  HR:  [] 106  Resp:  [18-24] 24  BP: (143-154)/() 154/96  SpO2:  [97 %-100 %] 99 %  Body mass index is 21.52 kg/m².     Input and Output Summary (last 24 hours):     Intake/Output Summary (Last 24 hours) at 2024 0832  Last data filed at 2024 0441  Gross per 24 hour   Intake 1270.83 ml   Output 930 ml   Net 340.83 ml       Physical Exam:   Physical Exam   HEENT-PERRLA, moist oral mucosa  Neck-supple, no JVD elevation   Respiratory-equal air entry bilaterally, no rales or rhonchi  Cardiovascular system-S1, S2 heard, no murmur or gallops or rubs  Abdomen-soft, nontender, no guarding or rigidity, bowel sounds heard, abdominal drain , colostomy present , benson insitu  Extremities-no pedal edema  Peripheral pulses palpable  Musculoskeletal-no contractures  Central nervous system-no acute focal neurological deficit ,no sensory or motor deficit noted.  Skin-no rash noted       Additional Data:     Labs:  Results from last 7 days   Lab Units 24  0441   WBC Thousand/uL 13.44*   HEMOGLOBIN g/dL 10.7*   HEMATOCRIT % 33.0*   PLATELETS Thousands/uL 457*   NEUTROS PCT % 80*   LYMPHS PCT % 10*   MONOS PCT % 7   EOS PCT % 3     Results from last 7 days   Lab Units 24  0441 24  0524   SODIUM mmol/L 141 142   POTASSIUM mmol/L 3.2* 3.8   CHLORIDE mmol/L 110* 103   CO2 mmol/L 25 33*   BUN mg/dL 18 20   CREATININE mg/dL 0.71 0.81   ANION GAP mmol/L 6 6   CALCIUM mg/dL  7.6* 8.4   ALBUMIN g/dL  --  2.8*   TOTAL BILIRUBIN mg/dL  --  0.45   ALK PHOS U/L  --  43   ALT U/L  --  15   AST U/L  --  27   GLUCOSE RANDOM mg/dL 341* 109         Results from last 7 days   Lab Units 01/13/24  0717   POC GLUCOSE mg/dl 144*               Lines/Drains:  Invasive Devices       Peripheral Intravenous Line  Duration             Peripheral IV 01/13/24 Right;Ventral (anterior) Forearm 4 days    Long-Dwell Peripheral IV (Midline) 01/16/24 Right Basilic <1 day              Drain  Duration             Colostomy LLQ 8 days    Urethral Catheter Latex 16 Fr. 8 days    Closed/Suction Drain Lateral;Right Abdomen Bulb 15 Fr. 7 days    Closed/Suction Drain Right Abdomen Bulb 15 Fr. 7 days    NG/OG/Enteral Tube Nasogastric 16 Fr Right nare 5 days                  Urinary Catheter:  Goal for removal: N/A- Discharging with Hannon               Imaging: Personally reviewed the following imaging: abdominal/pelvic CT    Recent Cultures (last 7 days):         Last 24 Hours Medication List:   Current Facility-Administered Medications   Medication Dose Route Frequency Provider Last Rate    acetaminophen  650 mg Oral Q6H PENELOPE AL MishraC      amoxicillin-clavulanate  1 tablet Oral Q12H PENELOPE Surekha Michelle PA-C      dextrose 5 % and sodium chloride 0.9 %  125 mL/hr Intravenous Continuous Joliesara Heller  mL/hr (01/16/24 1203)    diphenhydrAMINE  25 mg Intravenous Q6H PRN Surekha Michelle PA-C      enoxaparin  40 mg Subcutaneous Daily Surekha Michelle PA-C      hydrALAZINE  10 mg Intravenous Q6H PRN Orlando Gomez MD      HYDROmorphone  0.5 mg Intravenous Q6H PRN Sandra Alexander PA-C      iohexol  50 mL Oral Once in imaging Annmarie Redding MD      magnesium sulfate  2 g Intravenous Once Orlando Gomez MD      metoclopramide  5 mg Intravenous Q6H PRN Surekha Michelle PA-C      naloxone  0.04 mg Intravenous Q1MIN PRN SIMI Mishra-DELMAR      naloxone  0.04 mg Intravenous Q3 min PRN SIMI Mishra-DELMAR      nicotine   1 patch Transdermal Daily Surekha Michelle PA-C      ondansetron  4 mg Intravenous Q6H PRN Surekha Michelle PA-C      oxyCODONE  10 mg Oral Q6H PRN Sandra Alexander PA-C      potassium chloride  20 mEq Intravenous Once Orlando Gomez MD      potassium chloride  40 mEq Oral Once Orlando Gomez MD      potassium phosphate  21 mmol Intravenous Once Orlando Gomez MD          Today, Patient Was Seen By: Orlando Gomez MD    **Please Note: This note may have been constructed using a voice recognition system.**

## 2024-01-17 NOTE — ASSESSMENT & PLAN NOTE
"/96 (BP Location: Left arm)   Pulse (!) 106   Temp 97.9 °F (36.6 °C) (Oral)   Resp (!) 24   Ht 5' 11\" (1.803 m)   Wt 70 kg (154 lb 5.2 oz)   SpO2 99%   BMI 21.52 kg/m²   Elevated pressures but acceptable  Echo reported to be normal  Currently not on any antihypertensive agents  Use as needed hydralazine for systolic blood pressure greater than 170    "

## 2024-01-17 NOTE — ASSESSMENT & PLAN NOTE
Status post Jo's procedure and repair of bladder perforation on 1/9/24: Postop day 8.  General surgery following.  NG tube has been removed.  JOE drain removed on 1/11/2024.  Has been started on a clear liquid diet.  Associated electrolyte abnormalities, being repleted  Previously on Ancef and Flagyl, now transition to Augmentin  Currently on IV fluids D5 half-normal saline at 125 cc/h  CT A/P: Partial colonic obstruction 2/2 7 cm long apple core lesion of mid sigmoid colon suspicious for colonic malignancy.  CEA wnl, CT chest no evidence of metastatic disease   Cytology negative for malignancy  Follow surgical recommendations  Recommendations are to continue Hannon at discharge

## 2024-01-17 NOTE — ASSESSMENT & PLAN NOTE
As per surgical notes  Patient has had 8 days of IV antibiotics  Now transition to p.o. Augmentin day 2  Management per general surgery

## 2024-01-17 NOTE — PLAN OF CARE
Problem: OCCUPATIONAL THERAPY ADULT  Goal: Performs self-care activities at highest level of function for planned discharge setting.  See evaluation for individualized goals.  Description: Treatment Interventions: ADL retraining, Functional transfer training, Patient/family training, Compensatory technique education, Activityengagement          See flowsheet documentation for full assessment, interventions and recommendations.   Outcome: Progressing  Note: Limitation: Decreased ADL status, Decreased self-care trans, Decreased high-level ADLs, Decreased endurance  Prognosis: Good  Assessment: Pt seen for OT tx session with focus on ADL status and transfer safety. Patient initially agreeable to OT treatment session, however upon sitting EOB, suddenly deferred further mobility. Pt unable to provide concrete reasoning. Denied any acute complaints. Patient continues to be functioning below baseline level, occupational performance remains limited secondary to factors listed above, and pt at increased risk for falls and injury.  From OT standpoint, recommendation at time of d/c would be level 3 resources.  Patient to benefit from continued Occupational Therapy treatment while in the hospital to address deficits as defined above and maximize level of functional independence with ADLs and functional mobility. Pt left with call bell in reach, tray table in reach, needs met, bed alarm activated. RN aware.     Rehab Resource Intensity Level, OT: III (Minimum Resource Intensity)

## 2024-01-17 NOTE — ASSESSMENT & PLAN NOTE
Patient reports drinking liquor and beer multiple times a week, unable to specify quantity  Patient admits to occasional marijuana and cocaine use   UDS: (+) Opiates and Cocaine. Patient received IV Dilaudid and PO oxycodone inpatient prior to collection.  CIWA negative was negative for 72 hours - D/C protocol per previous provider

## 2024-01-17 NOTE — ASSESSMENT & PLAN NOTE
Levels low at 3.2: Giving 20 mEq x 2 p.o. solution and 1 dose of IV 20 IV  Associated hypomagnesemia of 1.8, giving 2 g IV  Associated low phosphatemia-giving 21 mmol IV  Recheck electrolytes in a.m.

## 2024-01-17 NOTE — UTILIZATION REVIEW
Continued Stay Review    Date: 1-17-24                           Current Patient Class: inpatient Current Level of Care: med surg    HPI:53 y.o. male initially admitted on 1-7-24    s/p Jo's procedure, repair of bladder perforation - colostomy LLQ.   Pancolitis     Assessment/Plan:   Pod 8  Tachycardia, tachypnea.   K 3.2. Phos 2.2. replete with iv K Phos x 1. Mag 1.8 replete with iv Mag 2 gm x 1. Continue to trend  BMP. Continue iv fluids 125/hr- decrease rate to 100/hr. .  Wound culture on 1-16 shows gram neg kana enteric like.  Continue po amoxicillin. Trend CBC/ DIFF. Advance diet to full liquid with toast.   JOE x 1. Hannon in place. Monitor Intake/ output.  Pain 5/10. Controlled on scheduled tylenol.       Vital Signs:     Patient Vitals for the past 24 hrs:   BP Temp Pulse Resp SpO2 Weight   01/17/24 0806 154/96 97.9 °F (36.6 °C) (!) 106 (!) 24 99 % --   01/17/24 0600 -- -- -- -- -- 70 kg (154 lb 5.2 oz)   01/16/24 2233 150/98 97.6 °F (36.4 °C) 93 18 99 % --   01/16/24 1602 143/97 (!) 97.2 °F (36.2 °C) 91 19 97 % --        01/15 0701  01/16 0700 01/16 0701  01/17 0700 01/17 0701  01/18 0700    P.O.  100    I.V. (mL/kg) 1036.4 (15.5) 1170.8 (16.7) 1441.7 (20.6)   IV Piggyback 100     Total Intake(mL/kg) 1136.4 (17) 1270.8 (18.2) 1441.7 (20.6)   Urine (mL/kg/hr) 1650 (1) 650 (0.4)    Emesis/NG output 500     Drains 0 5    Stool 625 275 150   Total Output 2775 930 150   Net -1638.7 +340.8 +1291.7     Pertinent Labs/Diagnostic Results:       Results from last 7 days   Lab Units 01/17/24  0441 01/16/24  0524 01/15/24  0528 01/14/24 0440 01/13/24  0426   WBC Thousand/uL 13.44* 15.64* 13.73* 12.75* 12.55*   HEMOGLOBIN g/dL 10.7* 11.2* 12.9 12.8 13.3   HEMATOCRIT % 33.0* 34.8* 39.7 38.5 38.6   PLATELETS Thousands/uL 457* 397* 407* 408* 358   NEUTROS ABS Thousands/µL 10.62* 12.44* 10.81* 9.93* 10.26*         Results from last 7 days   Lab Units 01/17/24  0441 01/16/24  0524 01/15/24  0528 01/14/24  0440  01/13/24  0426 01/12/24  0548   SODIUM mmol/L 141 142 148* 144 143 137   POTASSIUM mmol/L 3.2* 3.8 3.1* 2.8* 3.0* 3.5   CHLORIDE mmol/L 110* 103 97 88* 90* 95*   CO2 mmol/L 25 33* 45* 45* 45* 31   ANION GAP mmol/L 6 6 6 11 8 11   BUN mg/dL 18 20 23 23 17 13   CREATININE mg/dL 0.71 0.81 1.14 1.22 1.03 0.82   EGFR ml/min/1.73sq m 108 102 74 68 83 102   CALCIUM mg/dL 7.6* 8.4 8.7 8.6 8.7 9.1   MAGNESIUM mg/dL 1.8* 2.2 2.5 2.2 2.2 2.0   PHOSPHORUS mg/dL 2.2* 2.3* 2.6*  --   --  3.8     Results from last 7 days   Lab Units 01/16/24  0524 01/13/24  0426 01/11/24  0437   AST U/L 27 12* 28   ALT U/L 15 7 10   ALK PHOS U/L 43 51 50   TOTAL PROTEIN g/dL 6.6 6.5 6.1*   ALBUMIN g/dL 2.8* 3.0* 2.9*   TOTAL BILIRUBIN mg/dL 0.45 0.61 0.70     Results from last 7 days   Lab Units 01/13/24  0717   POC GLUCOSE mg/dl 144*     Results from last 7 days   Lab Units 01/17/24  0441 01/16/24  0524 01/15/24  0528 01/14/24  0440 01/13/24  0426 01/12/24  0548 01/11/24  1521 01/11/24  0437   GLUCOSE RANDOM mg/dL 341* 109 136 105 115 132 112 64*           Results from last 7 days   Lab Units 01/16/24  1246   WOUND CULTURE  1+ Growth of Gram Negative Jomar Enteric Like*       Medications:   Scheduled Medications:  acetaminophen, 650 mg, Oral, Q6H PENELOPE  amoxicillin-clavulanate, 1 tablet, Oral, Q12H PENELOPE  enoxaparin, 40 mg, Subcutaneous, Daily  magnesium sulfate, 2 g, Intravenous, Once  nicotine, 1 patch, Transdermal, Daily  potassium phosphate, 21 mmol, Intravenous, Once      Continuous IV Infusions:  dextrose 5 % and sodium chloride 0.9 %, 125 mL/hr, Intravenous, Continuous      PRN Meds:  diphenhydrAMINE, 25 mg, Intravenous, Q6H PRN  hydrALAZINE, 10 mg, Intravenous, Q6H PRN  HYDROmorphone, 0.5 mg, Intravenous, Q6H PRN  iohexol, 50 mL, Oral, Once in imaging  metoclopramide, 5 mg, Intravenous, Q6H PRN  naloxone, 0.04 mg, Intravenous, Q1MIN PRN  naloxone, 0.04 mg, Intravenous, Q3 min PRN  ondansetron, 4 mg, Intravenous, Q6H PRN  oxyCODONE, 10 mg, Oral,  Q6H PRN        Discharge Plan: to be determined     Network Utilization Review Department  ATTENTION: Please call with any questions or concerns to [phone] and carefully listen to the prompts so that you are directed to the right person. All voicemails are confidential.   For Discharge needs, contact Care Management DC Support Team at [phone] opt. 2  Send all requests for admission clinical reviews, approved or denied determinations and any other requests to dedicated fax number below belonging to the [facility] where the patient is receiving treatment. List of dedicated fax numbers for the Facilities:  FACILITY NAME UR FAX NUMBER   ADMISSION DENIALS (Administrative/Medical Necessity) [phone]   DISCHARGE SUPPORT TEAM (NETWORK) [phone]   PARENT CHILD HEALTH (Maternity/NICU/Pediatrics) [phone]   [facility] [phone]   [facility] [phone]   [facility] [phone]   [facility] [phone]   [facility] [phone]   [facility] [phone]   [facility] [phone]   [facility] [phone]   [facility] [phone]   [facility] [phone]   [facility] [phone]

## 2024-01-17 NOTE — PLAN OF CARE
Problem: PAIN - ADULT  Goal: Verbalizes/displays adequate comfort level or baseline comfort level  Description: Interventions:  - Encourage patient to monitor pain and request assistance  - Assess pain using appropriate pain scale  - Administer analgesics based on type and severity of pain and evaluate response  - Implement non-pharmacological measures as appropriate and evaluate response  - Consider cultural and social influences on pain and pain management  - Notify physician/advanced practitioner if interventions unsuccessful or patient reports new pain  Outcome: Progressing     Problem: DISCHARGE PLANNING  Goal: Discharge to home or other facility with appropriate resources  Description: INTERVENTIONS:  - Identify barriers to discharge w/patient and caregiver  - Arrange for needed discharge resources and transportation as appropriate  - Identify discharge learning needs (meds, wound care, etc.)  - Arrange for interpretive services to assist at discharge as needed  - Refer to Case Management Department for coordinating discharge planning if the patient needs post-hospital services based on physician/advanced practitioner order or complex needs related to functional status, cognitive ability, or social support system  Outcome: Progressing     Problem: GASTROINTESTINAL - ADULT  Goal: Minimal or absence of nausea and/or vomiting  Description: INTERVENTIONS:  - Administer IV fluids if ordered to ensure adequate hydration  - Maintain NPO status until nausea and vomiting are resolved  - Nasogastric tube if ordered  - Administer ordered antiemetic medications as needed  - Provide nonpharmacologic comfort measures as appropriate  - Advance diet as tolerated, if ordered  - Consider nutrition services referral to assist patient with adequate nutrition and appropriate food choices  Outcome: Progressing

## 2024-01-17 NOTE — OCCUPATIONAL THERAPY NOTE
"  Occupational Therapy Tx Note     Patient Name: Davis Goss  Today's Date: 1/17/2024  Problem List  Principal Problem:    Large bowel obstruction (HCC)  Active Problems:    Smoking    Substance use    Elevated blood pressure reading    Severe protein-calorie malnutrition (HCC)    Hypokalemia    Pancolitis (HCC)          01/17/24 1521   OT Last Visit   OT Visit Date 01/17/24   Note Type   Note Type Treatment   Pain Assessment   Pain Assessment Tool Sharif-Baker FACES   Sharif-Baker FACES Pain Rating 4   Pain Location/Orientation Location: Generalized   Restrictions/Precautions   Other Precautions Fall Risk;Bed Alarm;Chair Alarm   ADL   LB Dressing Assistance 2  Maximal Assistance   LB Dressing Deficit Don/doff R sock;Don/doff L sock   LB Dressing Comments Pt made no attempt to don despite having physical ability to. Pt requesting that friend at bedside perform LB dressing task.   Bed Mobility   Supine to Sit 5  Supervision   Additional items Verbal cues;Increased time required;Bedrails   Sit to Supine 5  Supervision   Additional items Verbal cues   Additional Comments Pt sat EOB for ~2 min. Rollator placed in front of pt in prepation for sit>stand transfer, however pt immediately stated \" I can't do this\". Pt asked to explain further, however already returning back to supine. Attempted to educate pt on importance of mobility, however pt states \"I don't want to take the chance and get a cramp in my leg\".   Subjective   Subjective Pt received in supine. Pt initially agreeable to session, however declined further mobility once seated EOB.   Cognition   Overall Cognitive Status WFL   Arousal/Participation Alert   Attention Within functional limits   Orientation Level Oriented X4   Memory Decreased recall of precautions   Following Commands Follows multistep commands without difficulty   Activity Tolerance   Activity Tolerance Patient limited by fatigue;Patient limited by pain   Medical Staff Made Aware RONNI Grimes "   Assessment   Assessment Pt seen for OT tx session with focus on ADL status and transfer safety. Patient initially agreeable to OT treatment session, however upon sitting EOB, suddenly deferred further mobility. Pt unable to provide concrete reasoning. Denied any acute complaints. Patient continues to be functioning below baseline level, occupational performance remains limited secondary to factors listed above, and pt at increased risk for falls and injury.  From OT standpoint, recommendation at time of d/c would be level 3 resources.  Patient to benefit from continued Occupational Therapy treatment while in the hospital to address deficits as defined above and maximize level of functional independence with ADLs and functional mobility. Pt left with call bell in reach, tray table in reach, needs met, bed alarm activated. RN aware.   Plan   Treatment Interventions ADL retraining;Functional transfer training;Patient/family training;Compensatory technique education;Activityengagement   Goal Expiration Date 01/20/24   OT Treatment Day 2   OT Frequency 2-3x/wk   Discharge Recommendation   Rehab Resource Intensity Level, OT III (Minimum Resource Intensity)   Additional Comments  The patient's raw score on the AM-PAC Daily Activity inpatient short form is 21, standardized score is 44.27, greater than 39.4. Patients at this level are likely to benefit from DC to home. Please refer to the recommendation of the Occupational Therapist for safe DC planning.   AM-PAC Daily Activity Inpatient   Lower Body Dressing 3   Bathing 3   Toileting 3   Upper Body Dressing 4   Grooming 4   Eating 4   Daily Activity Raw Score 21   Daily Activity Standardized Score (Calc for Raw Score >=11) 44.27   AM-PAC Applied Cognition Inpatient   Following a Speech/Presentation 4   Understanding Ordinary Conversation 4   Taking Medications 4   Remembering Where Things Are Placed or Put Away 4   Remembering List of 4-5 Errands 4   Taking Care of  Complicated Tasks 4   Applied Cognition Raw Score 24   Applied Cognition Standardized Score 62.21   End of Consult   Education Provided Yes;Family or social support of family present for education by provider   Patient Position at End of Consult Supine;Bed/Chair alarm activated;All needs within reach   Nurse Communication Nurse aware of consult             Cathy Archibald OTR/LOUISE

## 2024-01-17 NOTE — ASSESSMENT & PLAN NOTE
Malnutrition Findings:   Adult Malnutrition type: Acute illness  Adult Degree of Malnutrition: Other severe protein calorie malnutrition  Malnutrition Characteristics: Muscle loss, Inadequate energy      360 Statement: Moderate acute malnutrition r/t inadequate energy intake with colonic mass as evidenced by severe muscle loss (temples), intake < 50% of estimated needs x > 5 days.  Will treat with nutrition therapy and PO diet/supplement recommendations upon diet advancement.  BMI Findings:   Body mass index is 21.52 kg/m².   Nutrition following

## 2024-01-17 NOTE — PROGRESS NOTES
"Progress Note - General Surgery   Davis Goss 53 y.o. male MRN: [id number]  Unit/Bed#: -01 Encounter: [id number]    ASSESSMENT:  54 y/o male with history of polysubstance abuse presenting with obstructing sigmoid colon mass, now POD#8 s/p Jo's procedure and repair of bladder perforation, postop ileus resolved, now with postop superficial wound abscess.  Pathology report negative for any dysplasia or malignancy.  23 benign lymph nodes.  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   -midline wound packing changed and redressed  -I/Os  -Nicotine patch while inpatient. Smoking cessation education.   -DVT ppx, Lovenox  -IV analgesia prn  -Encourage use of IS  -Encourage OOB ambulation   -Maintain benson catheter at all times. Flush with sterile saline q 4 hours.   -JOE drain care, change dressing daily, strip tubing q shift   -PT/OT  -Dispo planning: home with [facility]  -hopeful for discharge tomorrow 1/18  -Appreciate primary team medical mangement      SUBJECTIVE:  Patient denies any nausea, vomiting, fevers, chills, severe abdominal pain.  He has tolerated clear liquids    OBJECTIVE:   Vitals:  Blood pressure 154/96, pulse (!) 106, temperature 97.9 °F (36.6 °C), temperature source Oral, resp. rate (!) 24, height 5' 11\" (1.803 m), weight 70 kg (154 lb 5.2 oz), SpO2 99%.,Body mass index is 21.52 kg/m².    I/Os:    Intake/Output Summary (Last 24 hours) at 1/17/2024 1402  Last data filed at 1/17/2024 1105  Gross per 24 hour   Intake 2612.5 ml   Output 580 ml   Net 2032.5 ml       Lines/Drains:  Invasive " Devices       Peripheral Intravenous Line  Duration             Peripheral IV 01/13/24 Right;Ventral (anterior) Forearm 4 days    Long-Dwell Peripheral IV (Midline) 01/16/24 Right Basilic 1 day              Drain  Duration             Closed/Suction Drain Lateral;Right Abdomen Bulb 15 Fr. 8 days    Closed/Suction Drain Right Abdomen Bulb 15 Fr. 8 days    Colostomy LLQ 8 days    Urethral Catheter Latex 16 Fr. 8 days    NG/OG/Enteral Tube Nasogastric 16 Fr Right nare 5 days                    Physical Exam  Vitals reviewed.   Constitutional:       General: He is awake. He is not in acute distress.     Appearance: He is well-developed. He is not toxic-appearing.   HENT:      Head: Normocephalic and atraumatic.   Cardiovascular:      Rate and Rhythm: Normal rate and regular rhythm.      Heart sounds: No murmur heard.  Pulmonary:      Effort: Pulmonary effort is normal. No tachypnea or bradypnea.      Breath sounds: Normal breath sounds. No wheezing, rhonchi or rales.   Abdominal:      General: Abdomen is flat. Bowel sounds are normal. There is no distension.      Palpations: Abdomen is soft.      Tenderness: There is no abdominal tenderness. There is no guarding or rebound.      Comments: Laparotomy incision has some erythema surrounding the area of purulent drainage. Staples have been removed and iodoform packing is in place.  Left sided stoma is pink viable functioning with gas and liquid in bag   JOE drain Right side of abdomen draining dark sanguinous fluid     Genitourinary:     Comments: Hannon catheter in place draining clear yellow urine  Musculoskeletal:      Cervical back: Neck supple.      Right lower leg: No edema.      Left lower leg: No edema.   Skin:     General: Skin is warm and dry.      Capillary Refill: Capillary refill takes less than 2 seconds.      Coloration: Skin is not cyanotic, jaundiced or mottled.   Neurological:      General: No focal deficit present.      Mental Status: He is alert and  oriented to person, place, and time.      GCS: GCS eye subscore is 4. GCS verbal subscore is 5. GCS motor subscore is 6.      Cranial Nerves: Cranial nerves 2-12 are intact.   Psychiatric:         Attention and Perception: Attention normal.         Mood and Affect: Mood normal.         Behavior: Behavior is cooperative.         Thought Content: Thought content normal.         Diagnostics:  I have personally reviewed pertinent lab results.         Recent Results (from the past 36 hour(s))   Comprehensive metabolic panel    Collection Time: 01/16/24  5:24 AM   Result Value Ref Range    Sodium 142 135 - 147 mmol/L    Potassium 3.8 3.5 - 5.3 mmol/L    Chloride 103 96 - 108 mmol/L    CO2 33 (H) 21 - 32 mmol/L    ANION GAP 6 mmol/L    BUN 20 5 - 25 mg/dL    Creatinine 0.81 0.60 - 1.30 mg/dL    Glucose 109 65 - 140 mg/dL    Calcium 8.4 8.4 - 10.2 mg/dL    Corrected Calcium 9.4 8.3 - 10.1 mg/dL    AST 27 13 - 39 U/L    ALT 15 7 - 52 U/L    Alkaline Phosphatase 43 34 - 104 U/L    Total Protein 6.6 6.4 - 8.4 g/dL    Albumin 2.8 (L) 3.5 - 5.0 g/dL    Total Bilirubin 0.45 0.20 - 1.00 mg/dL    eGFR 102 ml/min/1.73sq m   Magnesium    Collection Time: 01/16/24  5:24 AM   Result Value Ref Range    Magnesium 2.2 1.9 - 2.7 mg/dL   CBC and differential    Collection Time: 01/16/24  5:24 AM   Result Value Ref Range    WBC 15.64 (H) 4.31 - 10.16 Thousand/uL    RBC 4.00 3.88 - 5.62 Million/uL    Hemoglobin 11.2 (L) 12.0 - 17.0 g/dL    Hematocrit 34.8 (L) 36.5 - 49.3 %    MCV 87 82 - 98 fL    MCH 28.0 26.8 - 34.3 pg    MCHC 32.2 31.4 - 37.4 g/dL    RDW 15.2 (H) 11.6 - 15.1 %    MPV 11.8 8.9 - 12.7 fL    Platelets 397 (H) 149 - 390 Thousands/uL    nRBC 0 /100 WBCs    Neutrophils Relative 79 (H) 43 - 75 %    Immat GRANS % 1 0 - 2 %    Lymphocytes Relative 8 (L) 14 - 44 %    Monocytes Relative 8 4 - 12 %    Eosinophils Relative 4 0 - 6 %    Basophils Relative 0 0 - 1 %    Neutrophils Absolute 12.44 (H) 1.85 - 7.62 Thousands/µL    Immature  Grans Absolute 0.09 0.00 - 0.20 Thousand/uL    Lymphocytes Absolute 1.23 0.60 - 4.47 Thousands/µL    Monocytes Absolute 1.25 (H) 0.17 - 1.22 Thousand/µL    Eosinophils Absolute 0.56 0.00 - 0.61 Thousand/µL    Basophils Absolute 0.07 0.00 - 0.10 Thousands/µL   Phosphorus    Collection Time: 01/16/24  5:24 AM   Result Value Ref Range    Phosphorus 2.3 (L) 2.7 - 4.5 mg/dL   Wound culture and Gram stain    Collection Time: 01/16/24 12:46 PM    Specimen: Abdominal; Wound   Result Value Ref Range    Wound Culture 1+ Growth of Gram Negative Jomar Enteric Like (A)     Gram Stain Result 2+ Polys (A)     Gram Stain Result Rare Gram negative rods (A)    CBC and differential    Collection Time: 01/17/24  4:41 AM   Result Value Ref Range    WBC 13.44 (H) 4.31 - 10.16 Thousand/uL    RBC 3.84 (L) 3.88 - 5.62 Million/uL    Hemoglobin 10.7 (L) 12.0 - 17.0 g/dL    Hematocrit 33.0 (L) 36.5 - 49.3 %    MCV 86 82 - 98 fL    MCH 27.9 26.8 - 34.3 pg    MCHC 32.4 31.4 - 37.4 g/dL    RDW 15.0 11.6 - 15.1 %    MPV 10.5 8.9 - 12.7 fL    Platelets 457 (H) 149 - 390 Thousands/uL    nRBC 0 /100 WBCs    Neutrophils Relative 80 (H) 43 - 75 %    Immat GRANS % 0 0 - 2 %    Lymphocytes Relative 10 (L) 14 - 44 %    Monocytes Relative 7 4 - 12 %    Eosinophils Relative 3 0 - 6 %    Basophils Relative 0 0 - 1 %    Neutrophils Absolute 10.62 (H) 1.85 - 7.62 Thousands/µL    Immature Grans Absolute 0.06 0.00 - 0.20 Thousand/uL    Lymphocytes Absolute 1.34 0.60 - 4.47 Thousands/µL    Monocytes Absolute 0.93 0.17 - 1.22 Thousand/µL    Eosinophils Absolute 0.44 0.00 - 0.61 Thousand/µL    Basophils Absolute 0.05 0.00 - 0.10 Thousands/µL   Basic metabolic panel    Collection Time: 01/17/24  4:41 AM   Result Value Ref Range    Sodium 141 135 - 147 mmol/L    Potassium 3.2 (L) 3.5 - 5.3 mmol/L    Chloride 110 (H) 96 - 108 mmol/L    CO2 25 21 - 32 mmol/L    ANION GAP 6 mmol/L    BUN 18 5 - 25 mg/dL    Creatinine 0.71 0.60 - 1.30 mg/dL    Glucose 341 (H) 65 - 140  mg/dL    Calcium 7.6 (L) 8.4 - 10.2 mg/dL    eGFR 108 ml/min/1.73sq m   Magnesium    Collection Time: 01/17/24  4:41 AM   Result Value Ref Range    Magnesium 1.8 (L) 1.9 - 2.7 mg/dL   Phosphorus    Collection Time: 01/17/24  4:41 AM   Result Value Ref Range    Phosphorus 2.2 (L) 2.7 - 4.5 mg/dL       Current Medications:  Scheduled Meds:  Current Facility-Administered Medications   Medication Dose Route Frequency Provider Last Rate    acetaminophen  650 mg Oral Q6H PENELOPE Surekha Michelle PA-C      amoxicillin-clavulanate  1 tablet Oral Q12H [facility] Surekha Michelle PA-C      dextrose 5 % and sodium chloride 0.9 %  75 mL/hr Intravenous Continuous Chaim Cristobal PA-C 125 mL/hr (01/17/24 1105)    diphenhydrAMINE  25 mg Intravenous Q6H PRN Surekha Michelle PA-C      enoxaparin  40 mg Subcutaneous Daily Surekha Michelle PA-C      hydrALAZINE  10 mg Intravenous Q6H PRN Orlando Gomez MD      HYDROmorphone  0.5 mg Intravenous Q6H PRN Chaim Cristobal PA-C      iohexol  50 mL Oral Once in imaging Annmarie Redding MD      magnesium sulfate  2 g Intravenous Once Orlando Gomez MD 2 g (01/17/24 1326)    metoclopramide  5 mg Intravenous Q6H PRN Surekha Michelle PA-C      naloxone  0.04 mg Intravenous Q1MIN PRN Surekha Michelle PA-C      nicotine  1 patch Transdermal Daily Surekha Michelle PA-C      ondansetron  4 mg Intravenous Q6H PRN Surekha Michelle PA-C      oxyCODONE  10 mg Oral Q6H PRN Sandra Alexander PA-C      potassium phosphate  21 mmol Intravenous Once Orlando Gomez MD 21 mmol (01/17/24 1315)     Continuous Infusions:dextrose 5 % and sodium chloride 0.9 %, 75 mL/hr, Last Rate: 125 mL/hr (01/17/24 1105)      PRN Meds:    diphenhydrAMINE    hydrALAZINE    HYDROmorphone    iohexol    metoclopramide    naloxone    ondansetron    oxyCODONE      Chaim Cristobal PA-C  1/17/2024

## 2024-01-18 LAB
ANION GAP SERPL CALCULATED.3IONS-SCNC: 5 MMOL/L
BACTERIA WND AEROBE CULT: ABNORMAL
BACTERIA WND AEROBE CULT: ABNORMAL
BUN SERPL-MCNC: 15 MG/DL (ref 5–25)
CALCIUM SERPL-MCNC: 8.1 MG/DL (ref 8.4–10.2)
CHLORIDE SERPL-SCNC: 107 MMOL/L (ref 96–108)
CO2 SERPL-SCNC: 26 MMOL/L (ref 21–32)
CREAT SERPL-MCNC: 0.63 MG/DL (ref 0.6–1.3)
ERYTHROCYTE [DISTWIDTH] IN BLOOD BY AUTOMATED COUNT: 14.9 % (ref 11.6–15.1)
GFR SERPL CREATININE-BSD FRML MDRD: 114 ML/MIN/1.73SQ M
GLUCOSE SERPL-MCNC: 115 MG/DL (ref 65–140)
GLUCOSE SERPL-MCNC: 122 MG/DL (ref 65–140)
GLUCOSE SERPL-MCNC: 86 MG/DL (ref 65–140)
GLUCOSE SERPL-MCNC: 96 MG/DL (ref 65–140)
GLUCOSE SERPL-MCNC: 97 MG/DL (ref 65–140)
GRAM STN SPEC: ABNORMAL
GRAM STN SPEC: ABNORMAL
HCT VFR BLD AUTO: 34.7 % (ref 36.5–49.3)
HGB BLD-MCNC: 11.4 G/DL (ref 12–17)
MCH RBC QN AUTO: 28.4 PG (ref 26.8–34.3)
MCHC RBC AUTO-ENTMCNC: 32.9 G/DL (ref 31.4–37.4)
MCV RBC AUTO: 87 FL (ref 82–98)
PHOSPHATE SERPL-MCNC: 2.1 MG/DL (ref 2.7–4.5)
PLATELET # BLD AUTO: 508 THOUSANDS/UL (ref 149–390)
PMV BLD AUTO: 10.1 FL (ref 8.9–12.7)
POTASSIUM SERPL-SCNC: 3.7 MMOL/L (ref 3.5–5.3)
RBC # BLD AUTO: 4.01 MILLION/UL (ref 3.88–5.62)
SODIUM SERPL-SCNC: 138 MMOL/L (ref 135–147)
WBC # BLD AUTO: 16.28 THOUSAND/UL (ref 4.31–10.16)

## 2024-01-18 PROCEDURE — 80048 BASIC METABOLIC PNL TOTAL CA: CPT | Performed by: PHYSICIAN ASSISTANT

## 2024-01-18 PROCEDURE — 99024 POSTOP FOLLOW-UP VISIT: CPT | Performed by: PHYSICIAN ASSISTANT

## 2024-01-18 PROCEDURE — 84100 ASSAY OF PHOSPHORUS: CPT | Performed by: PHYSICIAN ASSISTANT

## 2024-01-18 PROCEDURE — 85027 COMPLETE CBC AUTOMATED: CPT | Performed by: PHYSICIAN ASSISTANT

## 2024-01-18 PROCEDURE — 99232 SBSQ HOSP IP/OBS MODERATE 35: CPT | Performed by: PHYSICIAN ASSISTANT

## 2024-01-18 PROCEDURE — 82948 REAGENT STRIP/BLOOD GLUCOSE: CPT

## 2024-01-18 RX ORDER — AMLODIPINE BESYLATE 10 MG/1
10 TABLET ORAL DAILY
Status: DISCONTINUED | OUTPATIENT
Start: 2024-01-19 | End: 2024-01-20 | Stop reason: HOSPADM

## 2024-01-18 RX ORDER — DEXTROSE AND SODIUM CHLORIDE 5; .9 G/100ML; G/100ML
50 INJECTION, SOLUTION INTRAVENOUS CONTINUOUS
Status: DISCONTINUED | OUTPATIENT
Start: 2024-01-18 | End: 2024-01-18

## 2024-01-18 RX ORDER — INSULIN LISPRO 100 [IU]/ML
1-5 INJECTION, SOLUTION INTRAVENOUS; SUBCUTANEOUS
Status: DISCONTINUED | OUTPATIENT
Start: 2024-01-18 | End: 2024-01-20 | Stop reason: HOSPADM

## 2024-01-18 RX ORDER — AMLODIPINE BESYLATE 5 MG/1
5 TABLET ORAL DAILY
Status: DISCONTINUED | OUTPATIENT
Start: 2024-01-18 | End: 2024-01-18

## 2024-01-18 RX ADMIN — OXYCODONE HYDROCHLORIDE 10 MG: 10 TABLET ORAL at 16:30

## 2024-01-18 RX ADMIN — AMOXICILLIN AND CLAVULANATE POTASSIUM 1 TABLET: 875; 125 TABLET, FILM COATED ORAL at 00:25

## 2024-01-18 RX ADMIN — HYDRALAZINE HYDROCHLORIDE 10 MG: 20 INJECTION, SOLUTION INTRAMUSCULAR; INTRAVENOUS at 16:30

## 2024-01-18 RX ADMIN — ACETAMINOPHEN 650 MG: 325 TABLET, FILM COATED ORAL at 13:50

## 2024-01-18 RX ADMIN — ENOXAPARIN SODIUM 40 MG: 40 INJECTION SUBCUTANEOUS at 10:03

## 2024-01-18 RX ADMIN — POTASSIUM & SODIUM PHOSPHATES POWDER PACK 280-160-250 MG 1 PACKET: 280-160-250 PACK at 16:42

## 2024-01-18 RX ADMIN — ACETAMINOPHEN 650 MG: 325 TABLET, FILM COATED ORAL at 05:50

## 2024-01-18 RX ADMIN — AMLODIPINE BESYLATE 5 MG: 5 TABLET ORAL at 10:07

## 2024-01-18 RX ADMIN — LEVOFLOXACIN 750 MG: 500 TABLET, FILM COATED ORAL at 13:50

## 2024-01-18 RX ADMIN — ACETAMINOPHEN 650 MG: 325 TABLET, FILM COATED ORAL at 19:15

## 2024-01-18 NOTE — QUICK NOTE
I will be glad to write orders for home health care / nursing needs  for santosh Hood till he is assigned a pcp

## 2024-01-18 NOTE — DISCHARGE INSTR - AVS FIRST PAGE
Postoperative Care Instructions      1. General: You may feel pulling sensations around the wound or funny aches and pains around the incisions. This is normal. Even minor surgery is a change in your body and this is your body's reaction to it. If you have had abdominal surgery, it may help to support the incision with a small pillow or blanket for comfort when moving or coughing.    2. Wound care:  If you have staples or stitches, they will be removed by the physician at your follow up appointment. Change the ribbon packing and the dressing over your midline wound once daily after showering.    3. Showering: You may shower. Please do not soak wound in standing water such as a bath, hot tub, pool, lake, etc. Do not scrub or use exfoliants on the surgical wounds.    4. Activity: You may go up and down stairs, walk as much as you are comfortable, but walk at least 3 times each day. If you have had abdominal surgery, do not perform any strenuous exercise or lift anything heavier than 15-20 pounds for at least 4 weeks, unless cleared by your physician.    5. Diet: You may resume your regular diet. Please drink lots of water.    6. Medications: Resume all of your previous medications, unless told otherwise by the doctor.  A good option for pain control is to start with acetaminophen(Tylenol) 650mg and ibuprofen(Advil) 400-600mg and alternate taking them every 3 hours.  Insure that you do not take more than 4000 mg of Tylenol per day.     7. Driving: You will need someone to drive you home on the day of surgery. Do not drive or make any important decisions while on narcotic pain medication. Generally, you may drive 48 hours after you've stopped taking all narcotic pain medications.    8. Upset Stomach: You may take Maalox, Tums, or similar items for an upset stomach. If your narcotic pain medication causes an upset stomach, do not take it on an empty stomach. Try taking it with at least some crackers or toast.     9.  Constipation: Patients often experience constipation after surgery, especially if taking narcotic pain medications. We recommend starting an over-the-counter medication for this, such as Metamucil, Senokot, Colace, milk of magnesia, etc. You may stop taking these medications a couple days after your last dose of narcotic medication. If you experience significant nausea or vomiting after abdominal surgery, call the office before trying any of these medications.     10. Call the office: If you are experiencing any of the following: fevers above 101.5°, significant nausea or vomiting, if the wound develops drainage and/or excessive redness around the wound, or if you have significant diarrhea or other worsening symptoms.

## 2024-01-18 NOTE — ASSESSMENT & PLAN NOTE
Malnutrition Findings:   Adult Malnutrition type: Acute illness  Adult Degree of Malnutrition: Other severe protein calorie malnutrition  Malnutrition Characteristics: Muscle loss, Inadequate energy      360 Statement: Moderate acute malnutrition r/t inadequate energy intake with colonic mass as evidenced by severe muscle loss (temples), intake < 50% of estimated needs x > 5 days.  Will treat with nutrition therapy and PO diet/supplement recommendations upon diet advancement.  BMI Findings:   Body mass index is 22.02 kg/m².   Nutrition following, continue supplements

## 2024-01-18 NOTE — PROGRESS NOTES
"[facility]  Progress Note  Name: Davis Goss I  MRN: [id number]  Unit/Bed#: [facility] [place] I Date of Admission: 1/7/2024   Date of Service: 1/18/2024 I Hospital Day: 11    Assessment/Plan   * Large bowel obstruction (HCC)  Assessment & Plan  Presented with ongoing constipation, weight loss over the past year. Associated loss of appetite, nausea and vomiting following meals since end of December. Initially seen in ED 1/6, left AMA but returned on 1/7 for further evaluation.   CT A/P: Partial colonic obstruction 2/2 7 cm long apple core lesion of mid sigmoid colon suspicious for colonic malignancy.  CEA wnl, CT chest no evidence of metastatic disease. Cytology negative for malignancy.   S/p Jo's procedure and repair of bladder perforation (1/9/24)  NGT, JOE drain since been removed. Hannon catheter in place.  D/C IVFs. Advanced to surgical soft diet with Ensure supplements.  General surgery cleared for discharge, to maintain Hannon catheter & F/U with surgery and urology  Needs home health services for Hannon catheter & colostomy management -  coordinating    Pancolitis (HCC)  Assessment & Plan  CT imaging and surgical notes with evidence of colitis.  Likely related to large bowel obstruction as above  Completed 8 days IV antibiotics, switch to oral Augmentin  Wound cx: + Pseudomonas, E. coli susceptible to levofloxacin.  Discussed with ID, switch to levofloxacin 750 mg daily x 7 days    Elevated blood pressure reading  Assessment & Plan  BP (!) 160/107 (BP Location: Left arm)   Pulse 92   Temp 97.7 °F (36.5 °C) (Oral)   Resp 16   Ht 5' 11\" (1.803 m)   Wt 71.6 kg (157 lb 13.6 oz)   SpO2 100%   BMI 22.02 kg/m²   BP intermittently hypertensive while inpatient  Not previously on antihypertensive agents  Start Norvasc 10 mg daily tomorrow    Hypokalemia  Assessment & Plan  Hypokalemia with associated hypomagnesemia and hypophosphatemia this admission   Likely in setting of poor oral intake " in postop setting   Replete phosphorus, check electrolytes in a.m.    Severe protein-calorie malnutrition (HCC)  Assessment & Plan  Malnutrition Findings:   Adult Malnutrition type: Acute illness  Adult Degree of Malnutrition: Other severe protein calorie malnutrition  Malnutrition Characteristics: Muscle loss, Inadequate energy      360 Statement: Moderate acute malnutrition r/t inadequate energy intake with colonic mass as evidenced by severe muscle loss (temples), intake < 50% of estimated needs x > 5 days.  Will treat with nutrition therapy and PO diet/supplement recommendations upon diet advancement.  BMI Findings:   Body mass index is 22.02 kg/m².   Nutrition following, continue supplements    Substance use  Assessment & Plan  Patient reports drinking liquor and beer multiple times a week, unable to specify quantity  Patient admits to occasional marijuana and cocaine use   UDS: (+) Opiates and Cocaine. Patient received IV Dilaudid and PO oxycodone inpatient prior to collection.  CIWA negative was negative for 72 hours - D/C protocol per previous provider    Smoking  Assessment & Plan  Smokes 1/2 PPD  NRT while admitted  Educated on smoking cessation               VTE Pharmacologic Prophylaxis: VTE Score: 3 Moderate Risk (Score 3-4) - Pharmacological DVT Prophylaxis Ordered: enoxaparin (Lovenox).    Mobility:   Basic Mobility Inpatient Raw Score: 18  JH-HLM Goal: 6: Walk 10 steps or more  JH-HLM Achieved: 1: Laying in bed  HLM Goal NOT achieved. Continue with multidisciplinary rounding and encourage appropriate mobility to improve upon HLM goals.    Patient Centered Rounds: I performed bedside rounds with nursing staff today.   Discussions with Specialists or Other Care Team Provider: General surgery, case management, ID    Education and Discussions with Family / Patient: Patient declined call to .     Total Time Spent on Date of Encounter in care of patient: 45 mins. This time was spent on one or  more of the following: performing physical exam; counseling and coordination of care; obtaining or reviewing history; documenting in the medical record; reviewing/ordering tests, medications or procedures; communicating with other healthcare professionals and discussing with patient's family/caregivers.    Current Length of Stay: 11 day(s)  Current Patient Status: Inpatient   Certification Statement: The patient will continue to require additional inpatient hospital stay due to safe discharge planning  Discharge Plan: Anticipate discharge tomorrow to home with home services.    Code Status: Level 1 - Full Code    Subjective:   Patient is seen at bedside this a.m., reports tolerating diet well with no nausea or vomiting, intermittent abdominal pain well-controlled.    Objective:     Vitals:   Temp (24hrs), Av.2 °F (36.8 °C), Min:97.5 °F (36.4 °C), Max:99.3 °F (37.4 °C)    Temp:  [97.5 °F (36.4 °C)-99.3 °F (37.4 °C)] 99.3 °F (37.4 °C)  HR:  [] 112  Resp:  [16-18] 17  BP: (143-160)/() 143/114  SpO2:  [95 %-100 %] 99 %  Body mass index is 22.02 kg/m².     Input and Output Summary (last 24 hours):     Intake/Output Summary (Last 24 hours) at 2024 1700  Last data filed at 2024 1229  Gross per 24 hour   Intake 2211.66 ml   Output 1425 ml   Net 786.66 ml       Physical Exam:   Physical Exam  Constitutional:       General: He is not in acute distress.     Appearance: He is not ill-appearing, toxic-appearing or diaphoretic.   Cardiovascular:      Rate and Rhythm: Normal rate.      Pulses: Normal pulses.      Heart sounds: Normal heart sounds.   Pulmonary:      Effort: Pulmonary effort is normal. No respiratory distress.      Breath sounds: Normal breath sounds.   Abdominal:      General: Bowel sounds are normal. There is no distension.      Palpations: Abdomen is soft.      Tenderness: There is abdominal tenderness. There is no guarding.      Comments: Mild tenderness to palpation of abdomen,  colostomy in place with small amount of soft brown stool   Musculoskeletal:         General: No swelling or tenderness.   Skin:     General: Skin is warm.   Neurological:      General: No focal deficit present.      Mental Status: He is alert.   Psychiatric:         Mood and Affect: Mood normal.         Behavior: Behavior normal.          Additional Data:     Labs:  Results from last 7 days   Lab Units 01/18/24  1106 01/17/24  0441   WBC Thousand/uL 16.28* 13.44*   HEMOGLOBIN g/dL 11.4* 10.7*   HEMATOCRIT % 34.7* 33.0*   PLATELETS Thousands/uL 508* 457*   NEUTROS PCT %  --  80*   LYMPHS PCT %  --  10*   MONOS PCT %  --  7   EOS PCT %  --  3     Results from last 7 days   Lab Units 01/18/24  1106 01/17/24  0441 01/16/24  0524   SODIUM mmol/L 138   < > 142   POTASSIUM mmol/L 3.7   < > 3.8   CHLORIDE mmol/L 107   < > 103   CO2 mmol/L 26   < > 33*   BUN mg/dL 15   < > 20   CREATININE mg/dL 0.63   < > 0.81   ANION GAP mmol/L 5   < > 6   CALCIUM mg/dL 8.1*   < > 8.4   ALBUMIN g/dL  --   --  2.8*   TOTAL BILIRUBIN mg/dL  --   --  0.45   ALK PHOS U/L  --   --  43   ALT U/L  --   --  15   AST U/L  --   --  27   GLUCOSE RANDOM mg/dL 96   < > 109    < > = values in this interval not displayed.         Results from last 7 days   Lab Units 01/18/24  1533 01/18/24  1234 01/18/24  1011 01/13/24  0717   POC GLUCOSE mg/dl 86 115 122 144*               Lines/Drains:  Invasive Devices       Peripheral Intravenous Line  Duration             Long-Dwell Peripheral IV (Midline) 01/16/24 Right Basilic 2 days              Drain  Duration             Closed/Suction Drain Lateral;Right Abdomen Bulb 15 Fr. 9 days    Closed/Suction Drain Right Abdomen Bulb 15 Fr. 9 days    Colostomy LLQ 9 days    Urethral Catheter Latex 16 Fr. 9 days    NG/OG/Enteral Tube Nasogastric 16 Fr Right nare 6 days                  Urinary Catheter:  Goal for removal: N/A- Discharging with Hannon               Imaging: Reviewed radiology reports from this admission  including: abdominal/pelvic CT    Recent Cultures (last 7 days):   Results from last 7 days   Lab Units 01/16/24  1246   GRAM STAIN RESULT  2+ Polys*  Rare Gram negative rods*   WOUND CULTURE  1+ Growth of Escherichia coli*  1+ Growth of Pseudomonas aeruginosa*       Last 24 Hours Medication List:   Current Facility-Administered Medications   Medication Dose Route Frequency Provider Last Rate    acetaminophen  650 mg Oral Q6H [facility] Surekha Michelle PA-C      [START ON 1/19/2024] amLODIPine  10 mg Oral Daily Marcia Li PA-C      diphenhydrAMINE  25 mg Intravenous Q6H PRN Surekha Michelle PA-C      enoxaparin  40 mg Subcutaneous Daily Surekha Michelle PA-C      hydrALAZINE  10 mg Intravenous Q6H PRN Orlando Gomez MD      HYDROmorphone  0.5 mg Intravenous Q6H PRN Chaim Cristobal PA-C      insulin lispro  1-5 Units Subcutaneous TID AC Marcia Li PA-C      insulin lispro  1-5 Units Subcutaneous HS Marcia Li PA-C      iohexol  50 mL Oral Once in imaging Annmarie Redding MD      levofloxacin  750 mg Oral Q24H Marcia Li PA-C      metoclopramide  5 mg Intravenous Q6H PRN Surekha Michelle PA-C      naloxone  0.04 mg Intravenous Q1MIN PRN Surekha Michelle PA-C      nicotine  1 patch Transdermal Daily Surekha Michelle PA-C      ondansetron  4 mg Intravenous Q6H PRN Surekha Michelle PA-C      oxyCODONE  10 mg Oral Q6H PRN Sandra Alexander PA-C      potassium-sodium phosphates  1 packet Oral BID With Meals Marcia Li PA-C          Today, Patient Was Seen By: Marcia Li PA-C    **Please Note: This note may have been constructed using a voice recognition system.**

## 2024-01-18 NOTE — ASSESSMENT & PLAN NOTE
Presented with ongoing constipation, weight loss over the past year. Associated loss of appetite, nausea and vomiting following meals since end of December. Initially seen in ED 1/6, left AMA but returned on 1/7 for further evaluation.   CT A/P: Partial colonic obstruction 2/2 7 cm long apple core lesion of mid sigmoid colon suspicious for colonic malignancy.  CEA wnl, CT chest no evidence of metastatic disease. Cytology negative for malignancy.   S/p Jo's procedure and repair of bladder perforation (1/9/24)  NGT, JOE drain since been removed. Hannon catheter in place.  D/C IVFs. Advanced to surgical soft diet with Ensure supplements.  General surgery cleared for discharge, to maintain Hannon catheter & F/U with surgery and urology  Needs home health services for Hannon catheter & colostomy management - CM coordinating

## 2024-01-18 NOTE — DISCHARGE SUMMARY
"[facility]  Discharge- Davis Goss 1970, 53 y.o. male MRN: [id number]  Unit/Bed#: [facility] [place] Encounter: [id number]  Primary Care Provider: No primary care provider on file.   Date and time admitted to hospital: 1/7/2024 11:15 AM    * Large bowel obstruction (HCC)  Assessment & Plan  Presented with ongoing constipation, weight loss over the past year. Associated loss of appetite, nausea and vomiting following meals since end of December. Initially seen in ED 1/6, left AMA but returned on 1/7 for further evaluation.   CT A/P: Partial colonic obstruction 2/2 7 cm long apple core lesion of mid sigmoid colon suspicious for colonic malignancy.  CEA wnl, CT chest no evidence of metastatic disease. Cytology negative for malignancy.   S/p Jo's procedure and repair of bladder perforation (1/9/24)  NGT, JOE drain since been removed. Hannon catheter in place.  D/C IVFs.  Advance to surgical soft diet with Ensure supplements.  General surgery cleared for discharge, to maintain Hannon catheter & F/U with surgery and urology    Pancolitis (HCC)  Assessment & Plan  CT imaging and surgical notes with evidence of colitis.  Likely related to large bowel obstruction as above  Completed 8 days IV antibiotics, switch to oral Augmentin  Wound cx: + Pseudomonas, E. coli susceptible to levofloxacin.  Discussed with ID, switch to levofloxacin 750 mg daily x 7 days    Elevated blood pressure reading  Assessment & Plan  BP (!) 160/107 (BP Location: Left arm)   Pulse 92   Temp 97.7 °F (36.5 °C) (Oral)   Resp 16   Ht 5' 11\" (1.803 m)   Wt 71.6 kg (157 lb 13.6 oz)   SpO2 100%   BMI 22.02 kg/m²   BP intermittently hypertensive while inpatient  Not previously on antihypertensive agents  Start Norvasc 5 mg daily    Hypokalemia  Assessment & Plan  Hypokalemia with associated hypomagnesemia and hypophosphatemia this admission   Likely in setting of poor oral intake in postop setting   Labs today    Severe " protein-calorie malnutrition (HCC)  Assessment & Plan  Malnutrition Findings:   Adult Malnutrition type: Acute illness  Adult Degree of Malnutrition: Other severe protein calorie malnutrition  Malnutrition Characteristics: Muscle loss, Inadequate energy      360 Statement: Moderate acute malnutrition r/t inadequate energy intake with colonic mass as evidenced by severe muscle loss (temples), intake < 50% of estimated needs x > 5 days.  Will treat with nutrition therapy and PO diet/supplement recommendations upon diet advancement.  BMI Findings:   Body mass index is 22.02 kg/m².   Nutrition following, continue supplements    Substance use  Assessment & Plan  Patient reports drinking liquor and beer multiple times a week, unable to specify quantity  Patient admits to occasional marijuana and cocaine use   UDS: (+) Opiates and Cocaine. Patient received IV Dilaudid and PO oxycodone inpatient prior to collection.  CIWA negative was negative for 72 hours - D/C protocol per previous provider    Smoking  Assessment & Plan  Smokes 1/2 PPD  NRT while admitted  Educated on smoking cessation      Medical Problems       Resolved Problems  Date Reviewed: 1/18/2024   None       Discharging Physician / Practitioner: Marcia Li PA-C  PCP: No primary care provider on file.  Admission Date:   Admission Orders (From admission, onward)       Ordered        01/07/24 1125  INPATIENT ADMISSION  Once                          Discharge Date: 01/18/24    Consultations During Hospital Stay:  ***    Procedures Performed:   ***    Significant Findings / Test Results:   ***    Incidental Findings:   ***   {SLIM Discharge Incidential Findings:82149}    Test Results Pending at Discharge (will require follow up):   ***     Outpatient Tests Requested:  ***    Complications:  ***    Reason for Admission: ***    Hospital Course:   Davis Goss is a 53 y.o. male patient who originally presented to the hospital on 1/7/2024 due to ***    {Complete  "this smartphrase if the patient is an inpatient and being discharged earlier than 2 midnights. If this does not apply, please delete this line:35917}    Please see above list of diagnoses and related plan for additional information.     Condition at Discharge: {Condition:20698}    Discharge Day Visit / Exam:   Subjective:  ***  Vitals: Blood Pressure: (!) 160/107 (01/18/24 0809)  Pulse: 92 (01/18/24 0809)  Temperature: 97.7 °F (36.5 °C) (01/18/24 0809)  Temp Source: Oral (01/18/24 0809)  Respirations: 16 (01/18/24 0809)  Height: 5' 11\" (180.3 cm) (01/09/24 0729)  Weight - Scale: 71.6 kg (157 lb 13.6 oz) (01/18/24 0549)  SpO2: 100 % (01/18/24 0809)  Exam:   Physical Exam ***    Discussion with Family: {Family Communication:12103}    Discharge instructions/Information to patient and family:   See after visit summary for information provided to patient and family.      Provisions for Follow-Up Care:  See after visit summary for information related to follow-up care and any pertinent home health orders.      Mobility at time of Discharge:   Basic Mobility Inpatient Raw Score: 18  JH-HLM Goal: 6: Walk 10 steps or more  JH-HLM Achieved: 6: Walk 10 steps or more  {Mobility:27427}     Disposition:   {Disposition on Discharge:45071}    Planned Readmission: ***     Discharge Statement:  I spent *** minutes discharging the patient. This time was spent on the day of discharge. I had direct contact with the patient on the day of discharge. Greater than 50% of the total time was spent examining patient, answering all patient questions, arranging and discussing plan of care with patient as well as directly providing post-discharge instructions.  Additional time then spent on discharge activities.    Discharge Medications:  See after visit summary for reconciled discharge medications provided to patient and/or family.      **Please Note: This note may have been constructed using a voice recognition system**      "

## 2024-01-18 NOTE — ASSESSMENT & PLAN NOTE
Hypokalemia with associated hypomagnesemia and hypophosphatemia this admission   Likely in setting of poor oral intake in postop setting   Replete phosphorus, check electrolytes in a.m.

## 2024-01-18 NOTE — PROGRESS NOTES
"Progress Note - General Surgery   Davis Goss 53 y.o. male MRN: [id number]  Unit/Bed#: -01 Encounter: [id number]    ASSESSMENT:  54 y/o male with history of polysubstance abuse presenting with obstructing sigmoid colon mass, now POD#9 s/p Jo's procedure and repair of bladder perforation, postop ileus (resolved), now with postop superficial wound abscess.  Wound culture growing E coli and pseudomonas   Pathology report negative for any dysplasia or malignancy.  23 benign lymph nodes.  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 Smoking cessation education.   -DVT ppx, Lovenox  -IV analgesia prn  -Encourage use of IS  -Encourage OOB ambulation   -Maintain benson catheter at all times. Flush with sterile saline q 4 hours.   -JOE drain removed  -PT/OT  -Dispo planning: home with [facility]  -discharge with [facility]   -ok for discharge from surgical standpoint pending electrolyte levels on blood work  -Appreciate primary team medical mangement      SUBJECTIVE:  Patient denies any nausea, vomiting, fevers, chills, severe abdominal pain.  He has tolerated clear liquids    OBJECTIVE:   Vitals:  Blood pressure (!) 160/107, pulse 92, temperature 97.7 °F (36.5 °C), temperature source Oral, resp. rate 16, height 5' 11\" (1.803 m), weight 71.6 kg (157 lb 13.6 oz), SpO2 100%.,Body mass index is 22.02 kg/m².    I/Os:    Intake/Output Summary (Last 24 hours) at 1/18/2024 0954  Last data filed at 1/18/2024 " 0553  Gross per 24 hour   Intake 1681.67 ml   Output 1575 ml   Net 106.67 ml       Lines/Drains:  Invasive Devices       Peripheral Intravenous Line  Duration             Long-Dwell Peripheral IV (Midline) 01/16/24 Right Basilic 1 day              Drain  Duration             Colostomy LLQ 9 days    Urethral Catheter Latex 16 Fr. 9 days    Closed/Suction Drain Lateral;Right Abdomen Bulb 15 Fr. 8 days    Closed/Suction Drain Right Abdomen Bulb 15 Fr. 8 days    NG/OG/Enteral Tube Nasogastric 16 Fr Right nare 6 days                    Physical Exam  Vitals reviewed.   Constitutional:       General: He is awake. He is not in acute distress.     Appearance: He is well-developed. He is not toxic-appearing.   HENT:      Head: Normocephalic and atraumatic.   Cardiovascular:      Rate and Rhythm: Normal rate and regular rhythm.      Heart sounds: No murmur heard.  Pulmonary:      Effort: Pulmonary effort is normal. No tachypnea or bradypnea.      Breath sounds: Normal breath sounds. No wheezing, rhonchi or rales.   Abdominal:      General: Abdomen is flat. Bowel sounds are normal. There is no distension.      Palpations: Abdomen is soft.      Tenderness: There is no abdominal tenderness. There is no guarding or rebound.      Comments: Laparotomy incision has some erythema surrounding the area of purulent drainage but this is overall improving. Staples have been removed and iodoform packing is in place.  Left sided stoma is pink viable functioning with semi-solid stool in bag.  JOE drain removed     Genitourinary:     Comments: Hannon catheter in place draining clear yellow urine  Musculoskeletal:      Cervical back: Neck supple.      Right lower leg: No edema.      Left lower leg: No edema.   Skin:     General: Skin is warm and dry.      Capillary Refill: Capillary refill takes less than 2 seconds.      Coloration: Skin is not cyanotic, jaundiced or mottled.   Neurological:      General: No focal deficit present.      Mental  Status: He is alert and oriented to person, place, and time.      GCS: GCS eye subscore is 4. GCS verbal subscore is 5. GCS motor subscore is 6.      Cranial Nerves: Cranial nerves 2-12 are intact.   Psychiatric:         Attention and Perception: Attention normal.         Mood and Affect: Mood normal.         Behavior: Behavior is cooperative.         Thought Content: Thought content normal.         Diagnostics:  I have personally reviewed pertinent lab results.         Recent Results (from the past 36 hour(s))   CBC and differential    Collection Time: 01/17/24  4:41 AM   Result Value Ref Range    WBC 13.44 (H) 4.31 - 10.16 Thousand/uL    RBC 3.84 (L) 3.88 - 5.62 Million/uL    Hemoglobin 10.7 (L) 12.0 - 17.0 g/dL    Hematocrit 33.0 (L) 36.5 - 49.3 %    MCV 86 82 - 98 fL    MCH 27.9 26.8 - 34.3 pg    MCHC 32.4 31.4 - 37.4 g/dL    RDW 15.0 11.6 - 15.1 %    MPV 10.5 8.9 - 12.7 fL    Platelets 457 (H) 149 - 390 Thousands/uL    nRBC 0 /100 WBCs    Neutrophils Relative 80 (H) 43 - 75 %    Immat GRANS % 0 0 - 2 %    Lymphocytes Relative 10 (L) 14 - 44 %    Monocytes Relative 7 4 - 12 %    Eosinophils Relative 3 0 - 6 %    Basophils Relative 0 0 - 1 %    Neutrophils Absolute 10.62 (H) 1.85 - 7.62 Thousands/µL    Immature Grans Absolute 0.06 0.00 - 0.20 Thousand/uL    Lymphocytes Absolute 1.34 0.60 - 4.47 Thousands/µL    Monocytes Absolute 0.93 0.17 - 1.22 Thousand/µL    Eosinophils Absolute 0.44 0.00 - 0.61 Thousand/µL    Basophils Absolute 0.05 0.00 - 0.10 Thousands/µL   Basic metabolic panel    Collection Time: 01/17/24  4:41 AM   Result Value Ref Range    Sodium 141 135 - 147 mmol/L    Potassium 3.2 (L) 3.5 - 5.3 mmol/L    Chloride 110 (H) 96 - 108 mmol/L    CO2 25 21 - 32 mmol/L    ANION GAP 6 mmol/L    BUN 18 5 - 25 mg/dL    Creatinine 0.71 0.60 - 1.30 mg/dL    Glucose 341 (H) 65 - 140 mg/dL    Calcium 7.6 (L) 8.4 - 10.2 mg/dL    eGFR 108 ml/min/1.73sq m   Magnesium    Collection Time: 01/17/24  4:41 AM   Result Value  Ref Range    Magnesium 1.8 (L) 1.9 - 2.7 mg/dL   Phosphorus    Collection Time: 01/17/24  4:41 AM   Result Value Ref Range    Phosphorus 2.2 (L) 2.7 - 4.5 mg/dL       Current Medications:  Scheduled Meds:  Current Facility-Administered Medications   Medication Dose Route Frequency Provider Last Rate    acetaminophen  650 mg Oral Q6H [facility] Surekha Michelle PA-C      amLODIPine  5 mg Oral Daily Marcia Li PA-C      amoxicillin-clavulanate  1 tablet Oral Q12H [facility] Surekha Michelle PA-C      dextrose 5 % and sodium chloride 0.9 %  50 mL/hr Intravenous Continuous Marcia Li PA-C      diphenhydrAMINE  25 mg Intravenous Q6H PRN Surekha Michelle PA-C      enoxaparin  40 mg Subcutaneous Daily Surekha Michelle PA-C      hydrALAZINE  10 mg Intravenous Q6H PRN Orlando Gomez MD      HYDROmorphone  0.5 mg Intravenous Q6H PRN Chaim Cristobal PA-C      insulin lispro  1-5 Units Subcutaneous TID AC Marcia Li PA-C      insulin lispro  1-5 Units Subcutaneous HS Marcia Li PA-C      iohexol  50 mL Oral Once in imaging Annmarie Redding MD      metoclopramide  5 mg Intravenous Q6H PRN Surekha Michelle PA-C      naloxone  0.04 mg Intravenous Q1MIN PRN Surekha Michelle PA-C      nicotine  1 patch Transdermal Daily Surekha Michelle PA-C      ondansetron  4 mg Intravenous Q6H PRN Surekha Michelle PA-C      oxyCODONE  10 mg Oral Q6H PRN Sandra Alexander PA-C       Continuous Infusions:dextrose 5 % and sodium chloride 0.9 %, 50 mL/hr      PRN Meds:    diphenhydrAMINE    hydrALAZINE    HYDROmorphone    iohexol    metoclopramide    naloxone    ondansetron    Vaibhav Cristobal PA-C  1/18/2024

## 2024-01-18 NOTE — UTILIZATION REVIEW
Continued Stay Review    Date: 1/18                          Current Patient Class: Inpatient  Current Level of Care: Med Surg    HPI:53 y.o. male initially admitted on 1/7     Assessment/Plan: POD #9   Per General surgery; Wound culture growing E coli and pseudomonas.     D/c Augmentin. Will do po levofloxacin for 7 more days. Plan for d/c with benson.   Electrolyte level on bld work pending.     Progress notes; Maintain benson cath. D/C IVFs. Advanced to surgical soft diet with Ensure supplements.   Continue po antibiotics. BP intermittently hypertensive while inpatient. Not previously on antihypertensive agents  Start Norvasc 10 mg daily tomorrow. C/o intermittent abdominal pain, controlled on pain meds.  Needs [facility] for [facility] cath and Colostomy management - [facility].       Vital Signs:   01/18/24 0809 97.7 °F (36.5 °C) 92 16 160/107 Abnormal  -- 100 % None (Room air) Lying   01/17/24 2340 97.5 °F (36.4 °C) 95 18 156/107 Abnormal  128 97 % None (Room air)      Pertinent Labs/Diagnostic Results:       Results from last 7 days   Lab Units 01/18/24  1106 01/17/24  0441 01/16/24  0524 01/15/24  0528 01/14/24  0440   WBC Thousand/uL 16.28* 13.44* 15.64* 13.73* 12.75*   HEMOGLOBIN g/dL 11.4* 10.7* 11.2* 12.9 12.8   HEMATOCRIT % 34.7* 33.0* 34.8* 39.7 38.5   PLATELETS Thousands/uL 508* 457* 397* 407* 408*   NEUTROS ABS Thousands/µL  --  10.62* 12.44* 10.81* 9.93*         Results from last 7 days   Lab Units 01/18/24  1106 01/17/24  0441 01/16/24  0524 01/15/24  0528 01/14/24  0440 01/13/24  0426 01/12/24  0548   SODIUM mmol/L 138 141 142 148* 144 143 137   POTASSIUM mmol/L 3.7 3.2* 3.8 3.1* 2.8* 3.0* 3.5   CHLORIDE mmol/L 107 110* 103 97 88* 90* 95*   CO2 mmol/L 26 25 33* 45* 45* 45* 31   ANION GAP mmol/L 5 6 6 6 11 8 11   BUN mg/dL 15 18 20 23 23 17 13   CREATININE mg/dL 0.63 0.71 0.81 1.14 1.22 1.03 0.82   EGFR ml/min/1.73sq m 114 108 102 74 68 83 102   CALCIUM mg/dL 8.1* 7.6* 8.4 8.7 8.6 8.7 9.1   MAGNESIUM mg/dL   --  1.8* 2.2 2.5 2.2 2.2 2.0   PHOSPHORUS mg/dL 2.1* 2.2* 2.3* 2.6*  --   --  3.8     Results from last 7 days   Lab Units 01/16/24  0524 01/13/24  0426   AST U/L 27 12*   ALT U/L 15 7   ALK PHOS U/L 43 51   TOTAL PROTEIN g/dL 6.6 6.5   ALBUMIN g/dL 2.8* 3.0*   TOTAL BILIRUBIN mg/dL 0.45 0.61     Results from last 7 days   Lab Units 01/18/24  1234 01/18/24  1011 01/13/24  0717   POC GLUCOSE mg/dl 115 122 144*     Results from last 7 days   Lab Units 01/18/24  1106 01/17/24  0441 01/16/24  0524 01/15/24  0528 01/14/24  0440 01/13/24  0426 01/12/24  0548 01/11/24  1521   GLUCOSE RANDOM mg/dL 96 341* 109 136 105 115 132 112       Results from last 7 days   Lab Units 01/16/24  1246   GRAM STAIN RESULT  2+ Polys*  Rare Gram negative rods*   WOUND CULTURE  1+ Growth of Escherichia coli*  1+ Growth of Pseudomonas aeruginosa*     Medications:   Scheduled Medications:  acetaminophen, 650 mg, Oral, Q6H [facility]ELOPE  amLODIPine, 5 mg, Oral, Daily  enoxaparin, 40 mg, Subcutaneous, Daily  insulin lispro, 1-5 Units, Subcutaneous, TID AC  insulin lispro, 1-5 Units, Subcutaneous, HS  levofloxacin, 750 mg, Oral, Q24H  nicotine, 1 patch, Transdermal, Daily  potassium-sodium phosphates, 1 packet, Oral, BID With Meals    amoxicillin-clavulanate (AUGMENTIN) 875-125 mg per tablet 1 tablet  Dose: 1 tablet  Freq: Every 12 hours scheduled Route: PO  Start: 01/16/24 1130 End: 01/18/24 1051     Continuous IV Infusions:   dextrose 5 % and sodium chloride 0.9 % infusion  Rate: 50 mL/hr Dose: 50 mL/hr  Freq: Continuous Route: IV  Indications of Use: IV Hydration  Last Dose: Stopped (01/18/24 1229)  Start: 01/18/24 0830 End: 01/18/24 1132     dextrose 5 % and sodium chloride 0.9 % infusion  Rate: 75 mL/hr Dose: 75 mL/hr  Freq: Continuous Route: IV  Last Dose: Stopped (01/18/24 0825)  Start: 01/16/24 1330 End: 01/18/24 0823     PRN Meds:  diphenhydrAMINE, 25 mg, Intravenous, Q6H PRN  hydrALAZINE, 10 mg, Intravenous, Q6H PRN  HYDROmorphone, 0.5 mg,  Intravenous, Q6H PRN  iohexol, 50 mL, Oral, Once in imaging  metoclopramide, 5 mg, Intravenous, Q6H PRN  naloxone, 0.04 mg, Intravenous, Q1MIN PRN  ondansetron, 4 mg, Intravenous, Q6H PRN  oxyCODONE, 10 mg, Oral, Q6H PRN        Discharge Plan: See above    Network Utilization Review Department  ATTENTION: Please call with any questions or concerns to [phone] and carefully listen to the prompts so that you are directed to the right person. All voicemails are confidential.   For Discharge needs, contact Care Management DC Support Team at [phone] opt. 2  Send all requests for admission clinical reviews, approved or denied determinations and any other requests to dedicated fax number below belonging to the campus where the patient is receiving treatment. List of dedicated fax numbers for the Facilities:  FACILITY NAME UR FAX NUMBER   ADMISSION DENIALS (Administrative/Medical Necessity) [phone]   DISCHARGE SUPPORT TEAM (NETWORK) [phone]   PARENT CHILD HEALTH (Maternity/NICU/Pediatrics) [phone]   [facility] [phone]   [facility] [phone]   [facility] [phone]   [facility] [phone]   [facility] [phone]   [facility] [phone]   [facility] [phone]   [facility] [address] [facility] [phone]   [facility] [phone]   [facility] [phone]   [facility] [phone]

## 2024-01-18 NOTE — ASSESSMENT & PLAN NOTE
CT imaging and surgical notes with evidence of colitis.  Likely related to large bowel obstruction as above  Completed 8 days IV antibiotics, switch to oral Augmentin  Wound cx: + Pseudomonas, E. coli susceptible to levofloxacin.  Discussed with ID, switch to levofloxacin 750 mg daily x 7 days

## 2024-01-18 NOTE — WOUND OSTOMY CARE
Progress Note- Ostomy  Davis Goss 53 y.o. male  [id number]  --01        Assessment:  Patient seen today for ostomy teaching.  Instructed on surgery and how stoma was created, healthy characteristics of a stoma, and when to notify the physician.  Instructed on daily care of ostomy pouch--emptying when 1/3 full of gas or stool, cleaning, showering with pouch on/off, changing pouch every 3-5 days or if leaking..  Demonstrated pouch emptying/cleaning.  Demonstrated pouching system change using one piece cut-to-fit pouch--removal of pouch with push/pull method, cleansing with warm water only, measuring stoma, cutting pouch barrier to appropriate size, application of skin prep, application of pouch, holding gentle pressure on pouch after application for best adherence. Patient was able to follow along, all questions answered. Patient did not participate in hands on return demonstration. Patient was able to verbally repeat steps to changing and emptying pouch.  Instructed on how to obtain ostomy supplies through visiting nurses and about outpatient ostomy clinic should patient have pouching problems after hospitalization.  Ostomy supplies at bedside.              Bag Change Steps:  1. Peel back pouch using push-pull method, may use non-alcohol adhesive remover. Remove pouch from top to bottom.   2. Use warm water only to cleanse skin around the stoma (fifi-stomal skin).   3. Make sure all adhesive residue is removed and skin is dry and not oily.  4. Measure stoma size using measuring guide and trace correct measurements onto back of pouch.  5. Then cut or mold the backing of pouch out to correct shape/size.  6. Place pouch over stoma and onto skin.  7. Use warmth of hand to apply gentle pressure to help backing of pouch to adhere well to skin.        Stoma:     Red, slightly budded stoma measuring 1 1/2 inch by 1 3/4 inch, connected to peristomal skin junction, skin is intact, no issues noted. Draining soft  brown stool.            Ilene Suarez BSN, RN, CWOCN

## 2024-01-18 NOTE — ASSESSMENT & PLAN NOTE
"BP (!) 160/107 (BP Location: Left arm)   Pulse 92   Temp 97.7 °F (36.5 °C) (Oral)   Resp 16   Ht 5' 11\" (1.803 m)   Wt 71.6 kg (157 lb 13.6 oz)   SpO2 100%   BMI 22.02 kg/m²   BP intermittently hypertensive while inpatient  Not previously on antihypertensive agents  Start Norvasc 10 mg daily tomorrow  "

## 2024-01-19 LAB
ANION GAP SERPL CALCULATED.3IONS-SCNC: 6 MMOL/L
BUN SERPL-MCNC: 15 MG/DL (ref 5–25)
CALCIUM SERPL-MCNC: 8.3 MG/DL (ref 8.4–10.2)
CHLORIDE SERPL-SCNC: 103 MMOL/L (ref 96–108)
CO2 SERPL-SCNC: 24 MMOL/L (ref 21–32)
CREAT SERPL-MCNC: 0.62 MG/DL (ref 0.6–1.3)
GFR SERPL CREATININE-BSD FRML MDRD: 114 ML/MIN/1.73SQ M
GLUCOSE SERPL-MCNC: 103 MG/DL (ref 65–140)
GLUCOSE SERPL-MCNC: 110 MG/DL (ref 65–140)
GLUCOSE SERPL-MCNC: 120 MG/DL (ref 65–140)
GLUCOSE SERPL-MCNC: 134 MG/DL (ref 65–140)
GLUCOSE SERPL-MCNC: 96 MG/DL (ref 65–140)
MAGNESIUM SERPL-MCNC: 1.6 MG/DL (ref 1.9–2.7)
PHOSPHATE SERPL-MCNC: 2.7 MG/DL (ref 2.7–4.5)
POTASSIUM SERPL-SCNC: 4 MMOL/L (ref 3.5–5.3)
SODIUM SERPL-SCNC: 133 MMOL/L (ref 135–147)

## 2024-01-19 PROCEDURE — 99024 POSTOP FOLLOW-UP VISIT: CPT | Performed by: PHYSICIAN ASSISTANT

## 2024-01-19 PROCEDURE — 82948 REAGENT STRIP/BLOOD GLUCOSE: CPT

## 2024-01-19 PROCEDURE — 83735 ASSAY OF MAGNESIUM: CPT | Performed by: PHYSICIAN ASSISTANT

## 2024-01-19 PROCEDURE — 80048 BASIC METABOLIC PNL TOTAL CA: CPT | Performed by: PHYSICIAN ASSISTANT

## 2024-01-19 PROCEDURE — 84100 ASSAY OF PHOSPHORUS: CPT | Performed by: PHYSICIAN ASSISTANT

## 2024-01-19 PROCEDURE — 99232 SBSQ HOSP IP/OBS MODERATE 35: CPT | Performed by: PHYSICIAN ASSISTANT

## 2024-01-19 RX ORDER — MAGNESIUM SULFATE HEPTAHYDRATE 40 MG/ML
2 INJECTION, SOLUTION INTRAVENOUS ONCE
Status: COMPLETED | OUTPATIENT
Start: 2024-01-19 | End: 2024-01-19

## 2024-01-19 RX ORDER — OXYCODONE HYDROCHLORIDE 5 MG/1
5 TABLET ORAL EVERY 6 HOURS PRN
Status: DISCONTINUED | OUTPATIENT
Start: 2024-01-19 | End: 2024-01-20 | Stop reason: HOSPADM

## 2024-01-19 RX ORDER — OXYCODONE HYDROCHLORIDE 5 MG/1
5 TABLET ORAL EVERY 6 HOURS PRN
Qty: 15 TABLET | Refills: 0 | Status: SHIPPED | OUTPATIENT
Start: 2024-01-19 | End: 2024-01-24

## 2024-01-19 RX ORDER — LEVOFLOXACIN 750 MG/1
750 TABLET, FILM COATED ORAL EVERY 24 HOURS
Qty: 5 TABLET | Refills: 0 | Status: SHIPPED | OUTPATIENT
Start: 2024-01-20 | End: 2024-01-25

## 2024-01-19 RX ORDER — AMLODIPINE BESYLATE 10 MG/1
10 TABLET ORAL DAILY
Qty: 30 TABLET | Refills: 0 | Status: SHIPPED | OUTPATIENT
Start: 2024-01-20 | End: 2024-02-19

## 2024-01-19 RX ORDER — ACETAMINOPHEN 325 MG/1
650 TABLET ORAL EVERY 6 HOURS PRN
Status: DISCONTINUED | OUTPATIENT
Start: 2024-01-19 | End: 2024-01-20 | Stop reason: HOSPADM

## 2024-01-19 RX ADMIN — OXYCODONE HYDROCHLORIDE 10 MG: 10 TABLET ORAL at 09:53

## 2024-01-19 RX ADMIN — ACETAMINOPHEN 650 MG: 325 TABLET, FILM COATED ORAL at 20:25

## 2024-01-19 RX ADMIN — LEVOFLOXACIN 750 MG: 500 TABLET, FILM COATED ORAL at 15:40

## 2024-01-19 RX ADMIN — ACETAMINOPHEN 650 MG: 325 TABLET, FILM COATED ORAL at 01:23

## 2024-01-19 RX ADMIN — MAGNESIUM SULFATE HEPTAHYDRATE 2 G: 40 INJECTION, SOLUTION INTRAVENOUS at 09:39

## 2024-01-19 RX ADMIN — ACETAMINOPHEN 650 MG: 325 TABLET, FILM COATED ORAL at 15:40

## 2024-01-19 RX ADMIN — ENOXAPARIN SODIUM 40 MG: 40 INJECTION SUBCUTANEOUS at 09:39

## 2024-01-19 RX ADMIN — POTASSIUM & SODIUM PHOSPHATES POWDER PACK 280-160-250 MG 1 PACKET: 280-160-250 PACK at 09:39

## 2024-01-19 RX ADMIN — ACETAMINOPHEN 650 MG: 325 TABLET, FILM COATED ORAL at 05:02

## 2024-01-19 RX ADMIN — AMLODIPINE BESYLATE 10 MG: 10 TABLET ORAL at 09:39

## 2024-01-19 NOTE — ASSESSMENT & PLAN NOTE
"/92 (BP Location: Right arm)   Pulse 104   Temp 98 °F (36.7 °C) (Oral)   Resp 18   Ht 5' 11\" (1.803 m)   Wt 69.8 kg (153 lb 14.1 oz)   SpO2 98%   BMI 21.46 kg/m²   BP intermittently hypertensive while inpatient  Not previously on antihypertensive agents  Continue Norvasc 10 mg daily, F/U with PCP PRN  "

## 2024-01-19 NOTE — PROGRESS NOTES
"formerly [facility]  Progress Note  Name: Davis Goss I  MRN: [id number]  Unit/Bed#: MS Celena I Date of Admission: 1/7/2024   Date of Service: 1/19/2024 I Hospital Day: 12    Assessment/Plan   * Large bowel obstruction (HCC)  Assessment & Plan  Presented with ongoing constipation, weight loss over the past year. Associated loss of appetite, nausea and vomiting following meals since end of December. Initially seen in ED 1/6, left AMA but returned on 1/7 for further evaluation.   CT A/P: Partial colonic obstruction 2/2 7 cm long apple core lesion of mid sigmoid colon suspicious for colonic malignancy.  CEA wnl, CT chest no evidence of metastatic disease. Cytology negative for malignancy.   S/p Jo's procedure and repair of bladder perforation (1/9/24)  NGT, JOE drain since been removed. Hannon catheter in place.  D/C IVFs. Advanced to surgical soft diet with Ensure supplements.  General surgery cleared for discharge, to maintain Hannon catheter & F/U with surgery and urology  Needs home health services for Hannon catheter & colostomy management -  coordinating    Pancolitis (HCC)  Assessment & Plan  CT imaging and surgical notes with evidence of colitis.  Likely related to large bowel obstruction as above  Completed 8 days IV antibiotics, switch to oral Augmentin  Wound cx: + Pseudomonas, E. coli susceptible to levofloxacin.  Discussed with ID, continue levofloxacin 750 mg daily x 7 days    Elevated blood pressure reading  Assessment & Plan  /96 (BP Location: Left arm)   Pulse 100   Temp (!) 97.3 °F (36.3 °C) (Oral)   Resp 18   Ht 5' 11\" (1.803 m)   Wt 69.8 kg (153 lb 14.1 oz)   SpO2 100%   BMI 21.46 kg/m²   BP intermittently hypertensive while inpatient  Not previously on antihypertensive agents  Continue Norvasc 10 mg daily, F/U with PCP PRN    Hypokalemia  Assessment & Plan  Hypokalemia with associated hypomagnesemia and hypophosphatemia this admission   Likely in setting of poor " oral intake in postop setting   K+ and phosphorus improved, currently stable    Severe protein-calorie malnutrition (HCC)  Assessment & Plan  Malnutrition Findings:   Adult Malnutrition type: Acute illness  Adult Degree of Malnutrition: Other severe protein calorie malnutrition  Malnutrition Characteristics: Muscle loss, Inadequate energy      360 Statement: Moderate acute malnutrition r/t inadequate energy intake with colonic mass as evidenced by severe muscle loss (temples), intake < 50% of estimated needs x > 5 days.  Will treat with nutrition therapy and PO diet/supplement recommendations upon diet advancement.  BMI Findings:   Body mass index is 21.46 kg/m².   Nutrition following, continue supplements    Substance use  Assessment & Plan  Patient reports drinking liquor and beer multiple times a week, unable to specify quantity  Patient admits to occasional marijuana and cocaine use   UDS: (+) Opiates and Cocaine. Patient received IV Dilaudid and PO oxycodone inpatient prior to collection.  CIWA negative was negative for 72 hours - D/C protocol per previous provider    Smoking  Assessment & Plan  Smokes 1/2 PPD  NRT while admitted  Educated on smoking cessation               VTE Pharmacologic Prophylaxis: VTE Score: 3 Moderate Risk (Score 3-4) - Pharmacological DVT Prophylaxis Ordered: enoxaparin (Lovenox).    Mobility:   Basic Mobility Inpatient Raw Score: 20  JH-HLM Goal: 6: Walk 10 steps or more  JH-HLM Achieved: 6: Walk 10 steps or more  HLM Goal achieved. Continue to encourage appropriate mobility.    Patient Centered Rounds: I performed bedside rounds with nursing staff today.   Discussions with Specialists or Other Care Team Provider: General surgery, case management    Education and Discussions with Family / Patient: Patient declined call to .     Total Time Spent on Date of Encounter in care of patient: 35 mins. This time was spent on one or more of the following: performing physical exam;  counseling and coordination of care; obtaining or reviewing history; documenting in the medical record; reviewing/ordering tests, medications or procedures; communicating with other healthcare professionals and discussing with patient's family/caregivers.    Current Length of Stay: 12 day(s)  Current Patient Status: Inpatient   Certification Statement: The patient will continue to require additional inpatient hospital stay due to pending safe discharge planning with [facility]  Discharge Plan: Anticipate discharge later today or tomorrow to home with home services.    Code Status: Level 1 - Full Code    Subjective:   Patient is seen at bedside this a.m., denies any acute complaints at this time.    Objective:     Vitals:   Temp (24hrs), Av.2 °F (36.8 °C), Min:97.3 °F (36.3 °C), Max:99.3 °F (37.4 °C)    Temp:  [97.3 °F (36.3 °C)-99.3 °F (37.4 °C)] 97.3 °F (36.3 °C)  HR:  [] 100  Resp:  [17-19] 18  BP: (139-156)/() 156/96  SpO2:  [96 %-100 %] 100 %  Body mass index is 21.46 kg/m².     Input and Output Summary (last 24 hours):     Intake/Output Summary (Last 24 hours) at 2024 1147  Last data filed at 2024 0401  Gross per 24 hour   Intake 403.33 ml   Output 2250 ml   Net -1846.67 ml       Physical Exam:   Physical Exam  Constitutional:       General: He is not in acute distress.     Appearance: He is not ill-appearing, toxic-appearing or diaphoretic.   Cardiovascular:      Rate and Rhythm: Normal rate and regular rhythm.      Pulses: Normal pulses.      Heart sounds: Normal heart sounds.   Pulmonary:      Effort: Pulmonary effort is normal. No respiratory distress.      Breath sounds: Normal breath sounds.   Abdominal:      General: Bowel sounds are normal. There is no distension.      Palpations: Abdomen is soft.      Tenderness: There is no abdominal tenderness.      Comments: Colostomy in place, moderate amount of soft brown stool.  Serous drainage noted on abdominal dressing at site of prior JOE  drain, no bleeding.   Genitourinary:     Comments: Hannon catheter in place, juan-colored urine  Musculoskeletal:         General: No swelling or tenderness.   Skin:     General: Skin is warm.   Neurological:      General: No focal deficit present.      Mental Status: He is alert.   Psychiatric:         Mood and Affect: Mood normal.         Behavior: Behavior normal.          Additional Data:     Labs:  Results from last 7 days   Lab Units 01/18/24  1106 01/17/24  0441   WBC Thousand/uL 16.28* 13.44*   HEMOGLOBIN g/dL 11.4* 10.7*   HEMATOCRIT % 34.7* 33.0*   PLATELETS Thousands/uL 508* 457*   NEUTROS PCT %  --  80*   LYMPHS PCT %  --  10*   MONOS PCT %  --  7   EOS PCT %  --  3     Results from last 7 days   Lab Units 01/19/24  0501 01/17/24  0441 01/16/24  0524   SODIUM mmol/L 133*   < > 142   POTASSIUM mmol/L 4.0   < > 3.8   CHLORIDE mmol/L 103   < > 103   CO2 mmol/L 24   < > 33*   BUN mg/dL 15   < > 20   CREATININE mg/dL 0.62   < > 0.81   ANION GAP mmol/L 6   < > 6   CALCIUM mg/dL 8.3*   < > 8.4   ALBUMIN g/dL  --   --  2.8*   TOTAL BILIRUBIN mg/dL  --   --  0.45   ALK PHOS U/L  --   --  43   ALT U/L  --   --  15   AST U/L  --   --  27   GLUCOSE RANDOM mg/dL 96   < > 109    < > = values in this interval not displayed.         Results from last 7 days   Lab Units 01/19/24  1057 01/19/24  0709 01/18/24  2049 01/18/24  1533 01/18/24  1234 01/18/24  1011 01/13/24  0717   POC GLUCOSE mg/dl 134 103 97 86 115 122 144*               Lines/Drains:  Invasive Devices       Peripheral Intravenous Line  Duration             Long-Dwell Peripheral IV (Midline) 01/16/24 Right Basilic 3 days              Drain  Duration             Closed/Suction Drain Lateral;Right Abdomen Bulb 15 Fr. 10 days    Closed/Suction Drain Right Abdomen Bulb 15 Fr. 10 days    Colostomy LLQ 10 days    Urethral Catheter Latex 16 Fr. 10 days    NG/OG/Enteral Tube Nasogastric 16 Fr Right nare 7 days                  Urinary Catheter:  Goal for removal:  N/A- Discharging with Hannon               Imaging: No pertinent imaging reviewed.    Recent Cultures (last 7 days):   Results from last 7 days   Lab Units 01/16/24  1246   GRAM STAIN RESULT  2+ Polys*  Rare Gram negative rods*   WOUND CULTURE  1+ Growth of Escherichia coli*  1+ Growth of Pseudomonas aeruginosa*       Last 24 Hours Medication List:   Current Facility-Administered Medications   Medication Dose Route Frequency Provider Last Rate    acetaminophen  650 mg Oral Q6H [facility] Surekha Michelle PA-C      amLODIPine  10 mg Oral Daily Marcia Li PA-C      diphenhydrAMINE  25 mg Intravenous Q6H PRN Surekha Michelle PA-C      enoxaparin  40 mg Subcutaneous Daily Surekha Michelle PA-C      hydrALAZINE  10 mg Intravenous Q6H PRN Orlando Gomez MD      HYDROmorphone  0.5 mg Intravenous Q6H PRN Chaim Cristobal PA-C      insulin lispro  1-5 Units Subcutaneous TID AC Marcia Li PA-C      insulin lispro  1-5 Units Subcutaneous HS Marcia Li PA-C      iohexol  50 mL Oral Once in imaging Annmarie Redding MD      levofloxacin  750 mg Oral Q24H Marcia Li PA-C      metoclopramide  5 mg Intravenous Q6H PRN Surekha Michelle PA-C      naloxone  0.04 mg Intravenous Q1MIN PRN Surekha Michelle PA-C      nicotine  1 patch Transdermal Daily Surekha Michelle PA-C      ondansetron  4 mg Intravenous Q6H PRN Surekha Michelle PA-C      oxyCODONE  10 mg Oral Q6H PRN Sandra Alexander PA-C          Today, Patient Was Seen By: Marcia Li PA-C    **Please Note: This note may have been constructed using a voice recognition system.**     Yes

## 2024-01-19 NOTE — CASE MANAGEMENT
Case Management Progress Note    Patient name Davis Goss  Location /-01 MRN [id number]  : 1970 Date 2024       LOS (days): 12  Geometric Mean LOS (GMLOS) (days): 9.8  Days to GMLOS:-2.2        OBJECTIVE:        Current admission status: Inpatient  Preferred Pharmacy:   [id number] [address] [address]  Phone: [phone] Fax: [phone]    Primary Care Provider: No primary care provider on file.    Primary Insurance: BLUE CROSS  Secondary Insurance:     PROGRESS NOTE:    Cm continues to work on dc planning. Cm had lengthy conversation with [facility] agency in regards to patient's dc plan.  Cm was informed by [facility] liaison yesterday that Cm would need to have [facility] place in writing that they will write on-going HHC orders until PCP is established. Cm also informed yesterday that agency would need to confirm with insurance that agency in network and that patient has benefits for [facility] services.     In AM, Cm contacted patient's insurance and confirmed that he does have the benefit and about 120 days of C RN visits to use. Cm then obtained faxed copy of benefits, conversation reference number of [id number] and the written confirmation from  physician and sent it through [employer]. Cm also called [facility] later in day to see if they received information through [employer]. Despite the information provided, liaison still reports that they want to hear from their financial team. Cm informed liaison that patient will be held until [place] providers finale determination.     Cm provided hospital providers with update and encouragement to continue to educate patient on his care since [facility] has not responded and unclear if agency will provide acceptance.

## 2024-01-19 NOTE — ASSESSMENT & PLAN NOTE
Hypokalemia with associated hypomagnesemia and hypophosphatemia this admission   Likely in setting of poor oral intake in postop setting   K+ and phosphorus improved, currently stable

## 2024-01-19 NOTE — QUICK NOTE
Notified by  that home health services was approved and set up for discharge, however unfortunately patient was found to have new hematuria in Hannon catheter. Patient asymptomatic on exam, hematuria with no gross clots in tubing with juan-colored urine in Hannon bag. Suspect traumatic/irritation, notified surgical AP and placed urology consult. DO NOT irrigate Hannon, risk of perforation. Check CBC in AM.

## 2024-01-19 NOTE — PROGRESS NOTES
"Progress Note - General Surgery   Davis Goss 53 y.o. male MRN: [id number]  Unit/Bed#: -01 Encounter: [id number]    Assessment/Plan    52 y/o male with history of polysubstance abuse presenting with obstructing sigmoid colon mass, now POD#10 s/p Jo's procedure and repair of bladder perforation, postop ileus (resolved), now with postop superficial wound abscess.   Pt reports that he is doing well, no acute complaints, looking forward to going home  Abdomen soft, non-distended, appropriate incisional tenderness, normal bowel sounds  Midline incision with packing in place; sharp debridement of devitalized tissue, a few more staples removed, packed loosely with moistened gauze and ABD over top  JOE removed yesterday, IVF discontinued  Pt s/p R basilic midline placement by IR yesterday  AFVSS, resting tachycardia persistent  WBC 16 yesterday (from 13) - new new CBC today  Na 133 (138)  Ca 8.3 (8.1)  Phos 2.7 (2.1)  Mag 1.6 (1.8)  UO 1.95L via Hannon  Ostomy output 300cc    F/U CBC  Replete electrolytes  Advance diet as tolerated  WC with E. Coli and Pseudomonas susceptible to levofloxacin; complete 7-day course  Ambulate, IS  Continue Hannon, flush q4h  Pt appropriate for discharge from surgical perspective  Discharge pending arrangement of [facility] for Hannon and ostomy care  To d/w attending      /93 (BP Location: Left arm)   Pulse 99   Temp 98 °F (36.7 °C) (Oral)   Resp 19   Ht 5' 11\" (1.803 m)   Wt 69.8 kg (153 lb 14.1 oz)   SpO2 96%   BMI 21.46 kg/m²     Labs in chart were reviewed.  Results from last 7 days   Lab Units 01/19/24  0501   POTASSIUM mmol/L 4.0   CHLORIDE mmol/L 103   CO2 mmol/L 24   BUN mg/dL 15   CREATININE mg/dL 0.62           Intake/Output Summary (Last 24 hours) at 1/19/2024 0823  Last data filed at 1/19/2024 0401  Gross per 24 hour   Intake 2171.66 ml   Output 2250 ml   Net -78.34 ml           Subjective/Objective     Subjective: Pt seen and examined. He reports that he is feeling " well. Tolerating soft diet. No new complaints. He reports that he walked down the hallway once yesterday.     Review of Systems   Constitutional:  Negative for chills and fever.   Respiratory:  Negative for shortness of breath.    Cardiovascular:  Negative for chest pain.   Gastrointestinal:  Positive for abdominal pain (incisional). Negative for abdominal distention, nausea and vomiting.   Genitourinary:  Negative for hematuria.   Skin:  Positive for wound (surgical).          Objective:     Physical Exam  Vitals and nursing note reviewed.   Constitutional:       General: He is not in acute distress.     Appearance: He is not toxic-appearing.   HENT:      Head: Normocephalic and atraumatic.   Eyes:      Extraocular Movements: Extraocular movements intact.   Pulmonary:      Effort: Pulmonary effort is normal. No respiratory distress.   Abdominal:      General: Bowel sounds are normal. There is no distension.      Palpations: Abdomen is soft.      Tenderness: There is abdominal tenderness (incisional). There is no guarding.      Comments: Midline incision with minimal sanguinous/purulent drainage. Devitalized tissue sharply debrided with scissors from site of drainage. JOE site with steri strip in place.    Musculoskeletal:         General: Normal range of motion.      Cervical back: Normal range of motion.   Skin:     General: Skin is warm and dry.   Neurological:      Mental Status: He is alert and oriented to person, place, and time.   Psychiatric:         Mood and Affect: Mood normal.         Behavior: Behavior normal.         Thought Content: Thought content normal.            Sandra Alexander PA-C  1/19/2024

## 2024-01-19 NOTE — UTILIZATION REVIEW
Continued Stay Review    Date: 1/19/2024                          Current Patient Class: inpatient Current Level of Care: med/surg    HPI:53 y.o. male initially admitted on 1/7 with bowel obstruction s/p Jo's.     Assessment/Plan: denies complaints today. Colostomy in place, moderate amount of soft brown stool.  Serous drainage noted on abdominal dressing at site of prior JOE drain, no bleeding. Benson cath in place, juan colored urine, flush q4h  Continue po levofloxacin po x 7 days.  Plan for d/c home with [facility] for benson cath and colostomy teaching/assistance. CM coordinating. Continue po meds, supportive care. Continue reg diet. Encourage incentive spirometry, OOB/ambulation.     Vital Signs:   Date/Time Temp Pulse Resp BP MAP (mmHg) SpO2 O2 Device Patient Position - Orthostatic VS   01/19/24 0826 97.3 °F (36.3 °C) Abnormal  100 18 156/96 -- 100 % -- Lying   01/18/24 2300 98 °F (36.7 °C) 99 19 139/93 110 96 % None (Room air) Lying       Pertinent Labs/Diagnostic Results:     Results from last 7 days   Lab Units 01/18/24  1106 01/17/24  0441 01/16/24  0524 01/15/24  0528 01/14/24  0440   WBC Thousand/uL 16.28* 13.44* 15.64* 13.73* 12.75*   HEMOGLOBIN g/dL 11.4* 10.7* 11.2* 12.9 12.8   HEMATOCRIT % 34.7* 33.0* 34.8* 39.7 38.5   PLATELETS Thousands/uL 508* 457* 397* 407* 408*   NEUTROS ABS Thousands/µL  --  10.62* 12.44* 10.81* 9.93*     Results from last 7 days   Lab Units 01/19/24  0501 01/18/24  1106 01/17/24  0441 01/16/24  0524 01/15/24  0528 01/14/24  0440   SODIUM mmol/L 133* 138 141 142 148* 144   POTASSIUM mmol/L 4.0 3.7 3.2* 3.8 3.1* 2.8*   CHLORIDE mmol/L 103 107 110* 103 97 88*   CO2 mmol/L 24 26 25 33* 45* 45*   ANION GAP mmol/L 6 5 6 6 6 11   BUN mg/dL 15 15 18 20 23 23   CREATININE mg/dL 0.62 0.63 0.71 0.81 1.14 1.22   EGFR ml/min/1.73sq m 114 114 108 102 74 68   CALCIUM mg/dL 8.3* 8.1* 7.6* 8.4 8.7 8.6   MAGNESIUM mg/dL 1.6*  --  1.8* 2.2 2.5 2.2   PHOSPHORUS mg/dL 2.7 2.1* 2.2* 2.3* 2.6*  --       Results from last 7 days   Lab Units 01/19/24  1057 01/19/24  0709 01/18/24  2049 01/18/24  1533 01/18/24  1234 01/18/24  1011 01/13/24  0717   POC GLUCOSE mg/dl 134 103 97 86 115 122 144*     Results from last 7 days   Lab Units 01/19/24  0501 01/18/24  1106 01/17/24  0441 01/16/24  0524 01/15/24  0528 01/14/24  0440 01/13/24  0426   GLUCOSE RANDOM mg/dL 96 96 341* 109 136 105 115       Results from last 7 days   Lab Units 01/16/24  1246   GRAM STAIN RESULT  2+ Polys*  Rare Gram negative rods*   WOUND CULTURE  1+ Growth of Escherichia coli*  1+ Growth of Pseudomonas aeruginosa*       Medications:   Scheduled Medications:  acetaminophen, 650 mg, Oral, Q6H PENELOPE  amLODIPine, 10 mg, Oral, Daily  enoxaparin, 40 mg, Subcutaneous, Daily  insulin lispro, 1-5 Units, Subcutaneous, TID AC  insulin lispro, 1-5 Units, Subcutaneous, HS  levofloxacin, 750 mg, Oral, Q24H  nicotine, 1 patch, Transdermal, Daily    PRN Meds:  diphenhydrAMINE, 25 mg, Intravenous, Q6H PRN  hydrALAZINE, 10 mg, Intravenous, Q6H PRN 1/18 x1  HYDROmorphone, 0.5 mg, Intravenous, Q6H PRN  iohexol, 50 mL, Oral, Once in imaging  metoclopramide, 5 mg, Intravenous, Q6H PRN  naloxone, 0.04 mg, Intravenous, Q1MIN PRN  ondansetron, 4 mg, Intravenous, Q6H PRN  oxyCODONE, 10 mg, Oral, Q6H PRN 1/18 x1, 1/19 x1        Discharge Plan: home with [facility]    Network Utilization Review Department  ATTENTION: Please call with any questions or concerns to [phone] and carefully listen to the prompts so that you are directed to the right person. All voicemails are confidential.   For Discharge needs, contact Care Management DC Support Team at [phone] opt. 2  Send all requests for admission clinical reviews, approved or denied determinations and any other requests to dedicated fax number below belonging to the campus where the patient is receiving treatment. List of dedicated fax numbers for the Facilities:  FACILITY NAME UR FAX NUMBER   ADMISSION DENIALS  (Administrative/Medical Necessity) [phone]   DISCHARGE SUPPORT TEAM (NETWORK) [phone]   PARENT CHILD HEALTH (Maternity/NICU/Pediatrics) [phone]   [facility] [phone]   [facility] [phone]   [facility] [phone]   [facility] [phone]   Critical access hospital [phone]   [facility] [phone]   [facility] [phone]   [facility] [phone]   [facility] [phone]   [facility] [phone]   [facility] [phone]

## 2024-01-19 NOTE — DISCHARGE SUMMARY
"[facility]  Discharge- Davis Goss 1970, 53 y.o. male MRN: [id number]  Unit/Bed#: [facility] [place] Encounter: [id number]  Primary Care Provider: No primary care provider on file.   Date and time admitted to hospital: 1/7/2024 11:15 AM    * Large bowel obstruction (HCC)  Assessment & Plan  Presented with ongoing constipation, weight loss over the past year. Associated loss of appetite, nausea and vomiting following meals since end of December. Initially seen in ED 1/6, left AMA but returned on 1/7 for further evaluation.   CT A/P: Partial colonic obstruction 2/2 7 cm long apple core lesion of mid sigmoid colon suspicious for colonic malignancy.  CEA wnl, CT chest no evidence of metastatic disease. Cytology negative for malignancy.   S/p Jo's procedure and repair of bladder perforation (1/9/24)  NGT, JOE drain since been removed. Hannon catheter in place.  D/C IVFs. Advanced to surgical soft diet with Ensure supplements.  General surgery cleared for discharge, to maintain Hannon catheter & F/U with surgery and urology  Needs home health services for Hannon catheter & colostomy management -  coordinating    Pancolitis (HCC)  Assessment & Plan  CT imaging and surgical notes with evidence of colitis.  Likely related to large bowel obstruction as above  Completed 8 days IV antibiotics, switch to oral Augmentin  Wound cx: + Pseudomonas, E. coli susceptible to levofloxacin.  Discussed with ID, continue levofloxacin 750 mg daily x 7 days    Elevated blood pressure reading  Assessment & Plan  /92 (BP Location: Right arm)   Pulse 104   Temp 98 °F (36.7 °C) (Oral)   Resp 18   Ht 5' 11\" (1.803 m)   Wt 69.8 kg (153 lb 14.1 oz)   SpO2 98%   BMI 21.46 kg/m²   BP intermittently hypertensive while inpatient  Not previously on antihypertensive agents  Continue Norvasc 10 mg daily, F/U with PCP PRN    Hypokalemia  Assessment & Plan  Hypokalemia with associated hypomagnesemia and hypophosphatemia " this admission   Likely in setting of poor oral intake in postop setting   K+ and phosphorus improved, currently stable    Severe protein-calorie malnutrition (HCC)  Assessment & Plan  Malnutrition Findings:   Adult Malnutrition type: Acute illness  Adult Degree of Malnutrition: Other severe protein calorie malnutrition  Malnutrition Characteristics: Muscle loss, Inadequate energy      360 Statement: Moderate acute malnutrition r/t inadequate energy intake with colonic mass as evidenced by severe muscle loss (temples), intake < 50% of estimated needs x > 5 days.  Will treat with nutrition therapy and PO diet/supplement recommendations upon diet advancement.  BMI Findings:   Body mass index is 21.46 kg/m².   Nutrition following, continue supplements    Substance use  Assessment & Plan  Patient reports drinking liquor and beer multiple times a week, unable to specify quantity  Patient admits to occasional marijuana and cocaine use   UDS: (+) Opiates and Cocaine. Patient received IV Dilaudid and PO oxycodone inpatient prior to collection.  CIWA negative was negative for 72 hours - D/C protocol per previous provider    Smoking  Assessment & Plan  Smokes 1/2 PPD  NRT while admitted  Educated on smoking cessation      Medical Problems       Resolved Problems  Date Reviewed: 1/19/2024   None       Discharging Physician / Practitioner: Marcia Li PA-C  PCP: No primary care provider on file.  Admission Date:   Admission Orders (From admission, onward)       Ordered        01/07/24 1125  INPATIENT ADMISSION  Once                          Discharge Date: 01/19/24    Consultations During Hospital Stay:  ***    Procedures Performed:   ***    Significant Findings / Test Results:   ***    Incidental Findings:   ***   {SLIM Discharge Incidential Findings:99687}    Test Results Pending at Discharge (will require follow up):   ***     Outpatient Tests Requested:  ***    Complications:  ***    Reason for Admission:  ***    Hospital Course:   Davis Goss is a 53 y.o. male patient who originally presented to the hospital on 1/7/2024 due to ***    {Complete this smartphrase if the patient is an inpatient and being discharged earlier than 2 midnights. If this does not apply, please delete this line:69428}    Please see above list of diagnoses and related plan for additional information.     Condition at Discharge: {Condition:51779}    Discharge Day Visit / Exam:   * Please refer to separate progress note for these details *    Discussion with Family: {Family Communication:57063}    Discharge instructions/Information to patient and family:   See after visit summary for information provided to patient and family.      Provisions for Follow-Up Care:  See after visit summary for information related to follow-up care and any pertinent home health orders.      Mobility at time of Discharge:   Basic Mobility Inpatient Raw Score: 20  JH-HLM Goal: 6: Walk 10 steps or more  JH-HLM Achieved: 6: Walk 10 steps or more  {Mobility:01300}     Disposition:   {Disposition on Discharge:52203}    Planned Readmission: ***     Discharge Statement:  I spent *** minutes discharging the patient. This time was spent on the day of discharge. I had direct contact with the patient on the day of discharge. Greater than 50% of the total time was spent examining patient, answering all patient questions, arranging and discussing plan of care with patient as well as directly providing post-discharge instructions.  Additional time then spent on discharge activities.    Discharge Medications:  See after visit summary for reconciled discharge medications provided to patient and/or family.      **Please Note: This note may have been constructed using a voice recognition system**

## 2024-01-19 NOTE — ASSESSMENT & PLAN NOTE
"/96 (BP Location: Left arm)   Pulse 100   Temp (!) 97.3 °F (36.3 °C) (Oral)   Resp 18   Ht 5' 11\" (1.803 m)   Wt 69.8 kg (153 lb 14.1 oz)   SpO2 100%   BMI 21.46 kg/m²   BP intermittently hypertensive while inpatient  Not previously on antihypertensive agents  Continue Norvasc 10 mg daily, F/U with PCP PRN  "

## 2024-01-19 NOTE — ASSESSMENT & PLAN NOTE
CT imaging and surgical notes with evidence of colitis.  Likely related to large bowel obstruction as above  Completed 8 days IV antibiotics, switch to oral Augmentin  Wound cx: + Pseudomonas, E. coli susceptible to levofloxacin.  Discussed with ID, continue levofloxacin 750 mg daily x 7 days

## 2024-01-19 NOTE — ASSESSMENT & PLAN NOTE
Malnutrition Findings:   Adult Malnutrition type: Acute illness  Adult Degree of Malnutrition: Other severe protein calorie malnutrition  Malnutrition Characteristics: Muscle loss, Inadequate energy      360 Statement: Moderate acute malnutrition r/t inadequate energy intake with colonic mass as evidenced by severe muscle loss (temples), intake < 50% of estimated needs x > 5 days.  Will treat with nutrition therapy and PO diet/supplement recommendations upon diet advancement.  BMI Findings:   Body mass index is 21.46 kg/m².   Nutrition following, continue supplements

## 2024-01-20 VITALS
HEART RATE: 92 BPM | RESPIRATION RATE: 18 BRPM | HEIGHT: 71 IN | DIASTOLIC BLOOD PRESSURE: 87 MMHG | BODY MASS INDEX: 21.02 KG/M2 | SYSTOLIC BLOOD PRESSURE: 141 MMHG | WEIGHT: 150.13 LBS | OXYGEN SATURATION: 98 % | TEMPERATURE: 97.5 F

## 2024-01-20 LAB
ERYTHROCYTE [DISTWIDTH] IN BLOOD BY AUTOMATED COUNT: 14.6 % (ref 11.6–15.1)
GLUCOSE SERPL-MCNC: 137 MG/DL (ref 65–140)
GLUCOSE SERPL-MCNC: 90 MG/DL (ref 65–140)
HCT VFR BLD AUTO: 32.8 % (ref 36.5–49.3)
HGB BLD-MCNC: 10.9 G/DL (ref 12–17)
MCH RBC QN AUTO: 28.1 PG (ref 26.8–34.3)
MCHC RBC AUTO-ENTMCNC: 33.2 G/DL (ref 31.4–37.4)
MCV RBC AUTO: 85 FL (ref 82–98)
PLATELET # BLD AUTO: 577 THOUSANDS/UL (ref 149–390)
PMV BLD AUTO: 10 FL (ref 8.9–12.7)
RBC # BLD AUTO: 3.88 MILLION/UL (ref 3.88–5.62)
WBC # BLD AUTO: 13.45 THOUSAND/UL (ref 4.31–10.16)

## 2024-01-20 PROCEDURE — 99239 HOSP IP/OBS DSCHRG MGMT >30: CPT | Performed by: INTERNAL MEDICINE

## 2024-01-20 PROCEDURE — 82948 REAGENT STRIP/BLOOD GLUCOSE: CPT

## 2024-01-20 PROCEDURE — 85027 COMPLETE CBC AUTOMATED: CPT | Performed by: PHYSICIAN ASSISTANT

## 2024-01-20 PROCEDURE — 99232 SBSQ HOSP IP/OBS MODERATE 35: CPT | Performed by: SPECIALIST

## 2024-01-20 RX ADMIN — ENOXAPARIN SODIUM 40 MG: 40 INJECTION SUBCUTANEOUS at 09:33

## 2024-01-20 RX ADMIN — OXYCODONE HYDROCHLORIDE 5 MG: 5 TABLET ORAL at 01:30

## 2024-01-20 RX ADMIN — OXYCODONE HYDROCHLORIDE 5 MG: 5 TABLET ORAL at 12:02

## 2024-01-20 RX ADMIN — LEVOFLOXACIN 750 MG: 500 TABLET, FILM COATED ORAL at 12:02

## 2024-01-20 RX ADMIN — AMLODIPINE BESYLATE 10 MG: 10 TABLET ORAL at 09:33

## 2024-01-20 NOTE — PROGRESS NOTES
"Progress Note - General Surgery   Davis Goss 53 y.o. male MRN: [id number]  Unit/Bed#: -01 Encounter: [id number]    Assessment:  1) 54 y/o male with history of polysubstance abuse presenting with obstructing sigmoid colon mass, now POD#11 s/p Jo's procedure and repair of bladder perforation, postop ileus (resolved), now with postop superficial wound abscess -   AVSS, abdominal pain well-controlled currently, tolerating diet, 150 stool output, appropriate urinary output with 1.5 mL/kg/h, Hannon catheter still in place secondary to 2 layer bladder repair/closure, eager to be discharged, leukocytosis downtrending    Plan:  1)   -Continue antibiotics per goal of completing 7-day course  -Ambulation  -Colostomy care  -Appropriate for discharge from surgical standpoint  -Discharge per the medicine team  -Repack daily with gauze and central portion of midline wound where the staples were removed  -Continue Hannon catheter  -Follow-up with urology in approximately 4 days for void trial and removal of Hannon catheter    Subjective/Objective   Chief Complaint: I was getting get discharged yesterday but I ended up having to stay    Subjective: Patient was seen examined at bedside.  Patient denies any acute events overnight.  Patient has no complaints.  Patient is eager to be discharged.  Patient denies any nausea or vomiting.  Patient is tolerating regular diet.  Patient denies any abdominal pain.  Patient still having stomal output.  Patient has been ambulating.  Patient understands that he is follow-up with urology and he thinks he has an appointment on Wednesday.  Patient knows he has to continue Hannon catheter.     Objective:     Blood pressure 141/87, pulse 92, temperature 97.5 °F (36.4 °C), temperature source Tympanic, resp. rate 18, height 5' 11\" (1.803 m), weight 68.1 kg (150 lb 2.1 oz), SpO2 98%.,Body mass index is 20.94 kg/m².      Intake/Output Summary (Last 24 hours) at 1/20/2024 1206  Last data filed at " "1/20/2024 1119  Gross per 24 hour   Intake 200 ml   Output 3200 ml   Net -3000 ml       Invasive Devices       Peripheral Intravenous Line  Duration             Long-Dwell Peripheral IV (Midline) 01/16/24 Right Basilic 4 days              Drain  Duration             Closed/Suction Drain Lateral;Right Abdomen Bulb 15 Fr. 11 days    Closed/Suction Drain Right Abdomen Bulb 15 Fr. 11 days    Colostomy LLQ 11 days    Urethral Catheter Latex 16 Fr. 11 days    NG/OG/Enteral Tube Nasogastric 16 Fr Right nare 8 days                    Physical Exam: /87   Pulse 92   Temp 97.5 °F (36.4 °C) (Tympanic)   Resp 18   Ht 5' 11\" (1.803 m)   Wt 68.1 kg (150 lb 2.1 oz)   SpO2 98%   BMI 20.94 kg/m²   General appearance: alert and oriented, in no acute distress  Head: Normocephalic, without obvious abnormality, atraumatic  Lungs: clear to auscultation bilaterally  Heart: regular rate and rhythm, S1, S2 normal, no murmur, click, rub or gallop  Abdomen:  Soft, active bowel sounds, nondistended, mild tenderness along the midline incision particular with the wound is open  Skin: Skin color, texture, turgor normal. No rashes or lesions or middle of midline incision has opening where staples were removed and plain gauze is packed, slight weeping of serous fluid but no overt purulence, no erythematous border, no swelling, mild tenderness surrounding the area, in acute stage of wound healing    Lab, Imaging and other studies:I have personally reviewed pertinent lab results.  , CBC:   Lab Results   Component Value Date    WBC 13.45 (H) 01/20/2024    HGB 10.9 (L) 01/20/2024    HCT 32.8 (L) 01/20/2024    MCV 85 01/20/2024     (H) 01/20/2024    RBC 3.88 01/20/2024    MCH 28.1 01/20/2024    MCHC 33.2 01/20/2024    RDW 14.6 01/20/2024    MPV 10.0 01/20/2024   , CMP: No results found for: \"SODIUM\", \"K\", \"CL\", \"CO2\", \"ANIONGAP\", \"BUN\", \"CREATININE\", \"GLUCOSE\", \"CALCIUM\", \"AST\", \"ALT\", \"ALKPHOS\", \"PROT\", \"BILITOT\", \"EGFR\"  VTE " Pharmacologic Prophylaxis: Enoxaparin (Lovenox)  VTE Mechanical Prophylaxis: sequential compression device

## 2024-01-20 NOTE — INCIDENTAL FINDINGS
The following findings require follow up:  Radiographic finding   Finding: CT abdomen pelvis wo contrast: Mild thickening of the entire length of the colon to the ostomy site in keeping with a nonspecific colitis, likely infectious., Moderate diffuse small bowel distention without obstruction point in keeping with a postoperative ileus.    Follow up required: yes   Follow up should be done within 2-3 week(s)    Please notify the following clinician to assist with the follow up:   Dr. Salas , dr Cheek

## 2024-01-20 NOTE — PLAN OF CARE
Problem: PAIN - ADULT  Goal: Verbalizes/displays adequate comfort level or baseline comfort level  Description: Interventions:  - Encourage patient to monitor pain and request assistance  - Assess pain using appropriate pain scale  - Administer analgesics based on type and severity of pain and evaluate response  - Implement non-pharmacological measures as appropriate and evaluate response  - Consider cultural and social influences on pain and pain management  - Notify physician/advanced practitioner if interventions unsuccessful or patient reports new pain  Outcome: Progressing     Problem: DISCHARGE PLANNING  Goal: Discharge to home or other facility with appropriate resources  Description: INTERVENTIONS:  - Identify barriers to discharge w/patient and caregiver  - Arrange for needed discharge resources and transportation as appropriate  - Identify discharge learning needs (meds, wound care, etc.)  - Arrange for interpretive services to assist at discharge as needed  - Refer to Case Management Department for coordinating discharge planning if the patient needs post-hospital services based on physician/advanced practitioner order or complex needs related to functional status, cognitive ability, or social support system  Outcome: Progressing     Problem: Knowledge Deficit  Goal: Patient/family/caregiver demonstrates understanding of disease process, treatment plan, medications, and discharge instructions  Description: Complete learning assessment and assess knowledge base.  Interventions:  - Provide teaching at level of understanding  - Provide teaching via preferred learning methods  Outcome: Progressing     Problem: GASTROINTESTINAL - ADULT  Goal: Minimal or absence of nausea and/or vomiting  Description: INTERVENTIONS:  - Administer IV fluids if ordered to ensure adequate hydration  - Administer ordered antiemetic medications as needed  - Provide nonpharmacologic comfort measures as appropriate  - Advance diet  as tolerated, if ordered  - Consider nutrition services referral to assist patient with adequate nutrition and appropriate food choices  Outcome: Progressing  Goal: Maintains or returns to baseline bowel function  Description: INTERVENTIONS:  - Assess bowel function  - Encourage oral fluids to ensure adequate hydration  - Administer IV fluids if ordered to ensure adequate hydration  - Administer ordered medications as needed  - Encourage mobilization and activity  - Consider nutritional services referral to assist patient with adequate nutrition and appropriate food choices  Outcome: Progressing  Goal: Maintains adequate nutritional intake  Description: INTERVENTIONS:  - Monitor percentage of each meal consumed  - Identify factors contributing to decreased intake, treat as appropriate  - Assist with meals as needed  - Monitor I&O, weight, and lab values if indicated  - Obtain nutrition services referral as needed  Outcome: Progressing     Problem: Nutrition/Hydration-ADULT  Goal: Nutrient/Hydration intake appropriate for improving, restoring or maintaining nutritional needs  Description: Monitor and assess patient's nutrition/hydration status for malnutrition. Collaborate with interdisciplinary team and initiate plan and interventions as ordered.  Monitor patient's weight and dietary intake as ordered or per policy. Utilize nutrition screening tool and intervene as necessary. Determine patient's food preferences and provide high-protein, high-caloric foods as appropriate.     INTERVENTIONS:  - Monitor oral intake, urinary output, labs, and treatment plans  - Assess nutrition and hydration status and recommend course of action  - Evaluate amount of meals eaten  - Assist patient with eating if necessary   - Allow adequate time for meals  - Recommend/ encourage appropriate diets, oral nutritional supplements, and vitamin/mineral supplements  - Order, calculate, and assess calorie counts as needed  - Assess need for  intravenous fluids  - Provide specific nutrition/hydration education as appropriate  - Include patient/family/caregiver in decisions related to nutrition  Outcome: Progressing     Problem: Prexisting or High Potential for Compromised Skin Integrity  Goal: Skin integrity is maintained or improved  Description: INTERVENTIONS:  - Identify patients at risk for skin breakdown  - Assess and monitor skin integrity  - Assess and monitor nutrition and hydration status  - Monitor labs   - Assess for incontinence   - Turn and reposition patient  - Assist with mobility/ambulation  - Relieve pressure over bony prominences  - Avoid friction and shearing  - Provide appropriate hygiene as needed including keeping skin clean and dry  - Evaluate need for skin moisturizer/barrier cream  - Collaborate with interdisciplinary team   - Patient/family teaching  - Consider wound care consult   Outcome: Progressing

## 2024-01-20 NOTE — DISCHARGE SUMMARY
"Formerly [facility]  Discharge- Davis Goss 1970, 53 y.o. male MRN: [id number]  Unit/Bed#: [facility] Encounter: [id number]  Primary Care Provider: No primary care provider on file.   Date and time admitted to hospital: 1/7/2024 11:15 AM    Pancolitis (HCC)  Assessment & Plan  CT imaging and surgical notes with evidence of colitis.  Likely related to large bowel obstruction as above  Completed 8 days IV antibiotics, switch to oral Augmentin  Wound cx: + Pseudomonas, E. coli susceptible to levofloxacin.  Discussed with ID, continue levofloxacin 750 mg daily x 7 days    Hypokalemia  Assessment & Plan  Hypokalemia with associated hypomagnesemia and hypophosphatemia this admission   Likely in setting of poor oral intake in postop setting   K+ and phosphorus improved, currently stable    Severe protein-calorie malnutrition (HCC)  Assessment & Plan  Malnutrition Findings:   Adult Malnutrition type: Acute illness  Adult Degree of Malnutrition: Other severe protein calorie malnutrition  Malnutrition Characteristics: Muscle loss, Inadequate energy      360 Statement: Moderate acute malnutrition r/t inadequate energy intake with colonic mass as evidenced by severe muscle loss (temples), intake < 50% of estimated needs x > 5 days.  Will treat with nutrition therapy and PO diet/supplement recommendations upon diet advancement.  BMI Findings:   Body mass index is 21.46 kg/m².   Nutrition following, continue supplements    Elevated blood pressure reading  Assessment & Plan  /92 (BP Location: Right arm)   Pulse 104   Temp 98 °F (36.7 °C) (Oral)   Resp 18   Ht 5' 11\" (1.803 m)   Wt 69.8 kg (153 lb 14.1 oz)   SpO2 98%   BMI 21.46 kg/m²   BP intermittently hypertensive while inpatient  Not previously on antihypertensive agents  Continue Norvasc 10 mg daily, F/U with PCP PRN    Substance use  Assessment & Plan  Patient reports drinking liquor and beer multiple times a week, unable to specify " quantity  Patient admits to occasional marijuana and cocaine use   UDS: (+) Opiates and Cocaine. Patient received IV Dilaudid and PO oxycodone inpatient prior to collection.  CIWA negative was negative for 72 hours - D/C protocol per previous provider    Smoking  Assessment & Plan  Smokes 1/2 PPD  NRT while admitted  Educated on smoking cessation    * Large bowel obstruction (HCC)  Assessment & Plan  Presented with ongoing constipation, weight loss over the past year. Associated loss of appetite, nausea and vomiting following meals since end of December. Initially seen in ED 1/6, left AMA but returned on 1/7 for further evaluation.   CT A/P: Partial colonic obstruction 2/2 7 cm long apple core lesion of mid sigmoid colon suspicious for colonic malignancy.  CEA wnl, CT chest no evidence of metastatic disease. Cytology negative for malignancy.   S/p Jo's procedure and repair of bladder perforation (1/9/24)  NGT, JOE drain since been removed. Hannon catheter in place.  D/C IVFs. Advanced to surgical soft diet with Ensure supplements.  General surgery cleared for discharge, to maintain Hannon catheter & F/U with surgery and urology  Needs home health services for Hannon catheter & colostomy management -  coordinating        Medical Problems       Resolved Problems  Date Reviewed: 1/20/2024   None       Discharging Physician / Practitioner: Jolie Heller MD  PCP: No primary care provider on file.  Admission Date:   Admission Orders (From admission, onward)       Ordered        01/07/24 1125  INPATIENT ADMISSION  Once                          Discharge Date: 01/20/24    Consultations During Hospital Stay:  General surgery  Urology    Diagnosis at the time of discharge-  Large bowel obstruction secondary to stricture from diverticulitis/abscess and     Status post Jo's procedure and repair of bladder perforation, on 1/9/2024    Postop ileus    Postop superficial wound abscess with wound culture  growing E. coli and Pseudomonas    Pathology is negative for dysplasia or malignancy, 23 benign lymph nodes noted    Protein calorie malnutrition  Polysubstance abuse    Procedures Performed:   IR guided midline placement  Colonoscopy  Jo's procedure and repair of bladder perforation on 1/9/2024    Significant Findings / Test Results:       Incidental Findings:   Mild thickening of the entire length of the colon to the ostomy site in keeping with a nonspecific colitis, likely infectious.     Moderate diffuse small bowel distention without obstruction point in keeping with a postoperative ileus.   I reviewed the above mentioned incidental findings with the patient and/or family and they expressed understanding.    Test Results Pending at Discharge (will require follow up):   Follow-up with general surgery Dr. Cheek and PCP and urology regarding Hannon catheter, voiding trial     Outpatient Tests Requested:  nil    Complications:  nil    Reason for Admission: Abdominal pain and vomiting    Hospital Course:   Davis Goss is a 53 y.o. male patient who originally presented to the hospital on 1/7/2024 due to large bowel obstruction with abdominal pain nausea and vomiting and weight loss.  CT of the abdomen showed partial colonic obstruction with long apple core lesion of mid sigmoid colon .  Patient had colonoscopy and noted to have pancolitis and treated with IV antibiotic therapy.  And CEA was within normal limit and CT chest no evidence of metastatic disease cytology negative for malignancy.  Underwent Jo's procedure and repair of bladder perforation on 1/9/2024.  Patient was followed by general surgery and urology.  During the hospitalization also had wound infection with wound culture growing E. coli and Pseudomonas susceptible for levofloxacin and to complete 7-day course.  Patient is ambulating well.  Tolerating p.o. diet.  Noted to have mild hematuria however has resolved.  Will need to follow-up  "with PCP and general surgery with Dr. Cheek and urology team.        Please see above list of diagnoses and related plan for additional information.     Condition at Discharge: good    Discharge Day Visit / Exam:   Subjective: Patient feels better , afebrile   Vitals: Blood Pressure: 141/87 (01/20/24 0933)  Pulse: 92 (01/20/24 0738)  Temperature: 97.5 °F (36.4 °C) (01/20/24 0738)  Temp Source: Tympanic (01/20/24 0738)  Respirations: 18 (01/20/24 0738)  Height: 5' 11\" (180.3 cm) (01/09/24 0729)  Weight - Scale: 68.1 kg (150 lb 2.1 oz) (01/20/24 0540)  SpO2: 98 % (01/20/24 0738)  Exam:   Physical Exam   HEENT-PERRLA, moist oral mucosa  Neck-supple, no JVD elevation   Respiratory-equal air entry bilaterally, no rales or rhonchi  Cardiovascular system-S1, S2 heard, no murmur or gallops or rubs  Abdomen-soft, nontender, no guarding or rigidity, bowel sounds heard, colostomy insitu ,midline incision is clean , benson insitu , no hematuria noted   Extremities-no pedal edema  Peripheral pulses palpable  Musculoskeletal-no contractures  Central nervous system-no acute focal neurological deficit ,no sensory or motor deficit noted.  Skin-no rash noted       Discussion with Family: Patient declined call to .     Discharge instructions/Information to patient and family:   See after visit summary for information provided to patient and family.      Provisions for Follow-Up Care:  See after visit summary for information related to follow-up care and any pertinent home health orders.      Mobility at time of Discharge:   Basic Mobility Inpatient Raw Score: 18  JH-HLM Goal: 6: Walk 10 steps or more  JH-HLM Achieved: 6: Walk 10 steps or more  HLM Goal achieved. Continue to encourage appropriate mobility.     Disposition:   Home with VNA Services (Reminder: Complete face to face encounter)    Planned Readmission: nio     Discharge Statement:  I spent 50 minutes discharging the patient. This time was spent on the day of " discharge. I had direct contact with the patient on the day of discharge. Greater than 50% of the total time was spent examining patient, answering all patient questions, arranging and discussing plan of care with patient as well as directly providing post-discharge instructions.  Additional time then spent on discharge activities.    Discharge Medications:  See after visit summary for reconciled discharge medications provided to patient and/or family.      **Please Note: This note may have been constructed using a voice recognition system**

## 2024-01-22 NOTE — TELEPHONE ENCOUNTER
I called to give patient CS number to schedule CT scan. He requested a call back in the afternoon.

## 2024-01-22 NOTE — TELEPHONE ENCOUNTER
I called and LVM for patient with CS number to call and schedule CT. Then to call us back and schedule OV afterwards. Please watch for patient to schedule since this needs to be done next week.

## 2024-01-22 NOTE — UTILIZATION REVIEW
NOTIFICATION OF ADMISSION DISCHARGE   This is a Notification of Discharge from [facility]. Please be advised that this patient has been discharge from our facility. Below you will find the admission and discharge date and time including the patient’s disposition.   UTILIZATION REVIEW CONTACT:  Petty Ba  Utilization   Network Utilization Review Department  Phone: [phone] x carefully listen to the prompts. All voicemails are confidential.  Email: [email or web address]     ADMISSION INFORMATION  PRESENTATION DATE: 1/7/2024 11:15 AM  OBERVATION ADMISSION DATE:   INPATIENT ADMISSION DATE: 1/7/24 11:25 AM   DISCHARGE DATE: 1/20/2024  1:01 PM   DISPOSITION:Home with Home Health Care    Network Utilization Review Department  ATTENTION: Please call with any questions or concerns to [phone] and carefully listen to the prompts so that you are directed to the right person. All voicemails are confidential.   For Discharge needs, contact Care Management DC Support Team at [phone] opt. 2  Send all requests for admission clinical reviews, approved or denied determinations and any other requests to dedicated fax number below belonging to the campus where the patient is receiving treatment. List of dedicated fax numbers for the Facilities:  FACILITY NAME UR FAX NUMBER   ADMISSION DENIALS (Administrative/Medical Necessity) [phone]   DISCHARGE SUPPORT TEAM ([facility]) [phone]   PARENT CHILD HEALTH (Maternity/NICU/Pediatrics) [phone]   [facility] [phone]   [facility] [phone]   [facility] [phone]   [facility] [phone]   [facility] [phone]   [facility] [phone]   [facility] [phone]   [facility]  [place] [phone]   [facility] [phone]   [facility] [phone]   [facility] [phone]

## 2024-01-24 ENCOUNTER — OFFICE VISIT (OUTPATIENT)
Dept: SURGERY | Facility: CLINIC | Age: 54
End: 2024-01-24

## 2024-01-24 ENCOUNTER — OFFICE VISIT (OUTPATIENT)
Dept: FAMILY MEDICINE CLINIC | Facility: CLINIC | Age: 54
End: 2024-01-24
Payer: COMMERCIAL

## 2024-01-24 ENCOUNTER — TRANSITIONAL CARE MANAGEMENT (OUTPATIENT)
Dept: FAMILY MEDICINE CLINIC | Facility: CLINIC | Age: 54
End: 2024-01-24

## 2024-01-24 VITALS
DIASTOLIC BLOOD PRESSURE: 90 MMHG | HEART RATE: 108 BPM | WEIGHT: 155 LBS | SYSTOLIC BLOOD PRESSURE: 130 MMHG | BODY MASS INDEX: 21.7 KG/M2 | RESPIRATION RATE: 16 BRPM | TEMPERATURE: 97 F | OXYGEN SATURATION: 99 % | HEIGHT: 71 IN

## 2024-01-24 VITALS
WEIGHT: 152 LBS | TEMPERATURE: 98.6 F | DIASTOLIC BLOOD PRESSURE: 76 MMHG | RESPIRATION RATE: 18 BRPM | SYSTOLIC BLOOD PRESSURE: 118 MMHG | BODY MASS INDEX: 21.28 KG/M2 | HEIGHT: 71 IN | OXYGEN SATURATION: 98 % | HEART RATE: 118 BPM

## 2024-01-24 DIAGNOSIS — Z76.89 ENCOUNTER FOR SUPPORT AND COORDINATION OF TRANSITION OF CARE: Primary | ICD-10-CM

## 2024-01-24 DIAGNOSIS — R03.0 ELEVATED BLOOD PRESSURE READING: ICD-10-CM

## 2024-01-24 DIAGNOSIS — K56.699 LARGE BOWEL STRICTURE (HCC): Primary | ICD-10-CM

## 2024-01-24 DIAGNOSIS — E87.6 HYPOKALEMIA: ICD-10-CM

## 2024-01-24 DIAGNOSIS — D72.828 OTHER ELEVATED WHITE BLOOD CELL (WBC) COUNT: ICD-10-CM

## 2024-01-24 DIAGNOSIS — K51.00 PANCOLITIS (HCC): ICD-10-CM

## 2024-01-24 DIAGNOSIS — Z76.89 ENCOUNTER TO ESTABLISH CARE: ICD-10-CM

## 2024-01-24 DIAGNOSIS — K56.609 COLONIC OBSTRUCTION (HCC): ICD-10-CM

## 2024-01-24 PROCEDURE — 99024 POSTOP FOLLOW-UP VISIT: CPT | Performed by: SURGERY

## 2024-01-24 PROCEDURE — 99495 TRANSJ CARE MGMT MOD F2F 14D: CPT

## 2024-01-24 NOTE — ASSESSMENT & PLAN NOTE
Secondary to poor oral intake, potassium levels wnl prior to discharge. Pt denies recent alcohol intake has been tolerating regular diet.

## 2024-01-24 NOTE — ASSESSMENT & PLAN NOTE
Pt reports compliance with amlodipine 10 mg, BP today in office 130/90 pt reports anxiety secondary to current health issues affecting BP. Pt has [facility] 3 x/week to help monitor BP, counseled on low Na diet and will f/u in 4 weeks at next appt.

## 2024-01-24 NOTE — ASSESSMENT & PLAN NOTE
Pt with large bowel obstruction s/p Jo's procedure and 8 days of IV abx therapy. Pt was discharged home  01/20/24 on oral levofloxacin 750 mg qd has 2 days remaining thru 01/26/24, CBC done prior to d/c showed improving leukocytosis, recheck CBC in 2 weeks. Pt denies abdominal pain, fever, colostomy well functioning.

## 2024-01-24 NOTE — ASSESSMENT & PLAN NOTE
S/p Jo' procedure and repair of bladder perforation on 1/9/24. Pt is followed by [facility] reports independence with managing appliance. Pt  saw Dr Cheek 01/24/24 for stable removal to mid abdominal incision, dressing dry and intact  no sxs of infection, denies pain. Pt has f/u appt scheduled for 2/7/24.

## 2024-01-24 NOTE — PROGRESS NOTES
"Assessment/Plan: Removed his staples.  Some slough was debrided sharply from the superficial wound.  I told him he needs to follow-up with urology after CT cystogram.  He need to see me in 2 weeks.  He will be scheduled for a completion colonoscopy.  This will be followed with stoma reversal.  Patient was informed that his pathology showed it to be a benign stricture in the sigmoid colon.  All the lymph nodes harvested were negative for malignancy.    No problem-specific Assessment & Plan notes found for this encounter.       Diagnoses and all orders for this visit:    Large bowel stricture (HCC)          Subjective:      Patient ID: Davis Goss is a 53 y.o. male.    53-year-old male patient who came to my office for follow-up.  2 weeks ago he had come to the hospital with large bowel obstruction.  On CT scan it was reported as a mass of the sigmoid colon.  He was taken for laparotomy and sigmoid colectomy with Jo's was performed.  The colon was infiltrating into the bladder which led to a portion of the bladder to be removed at was repaired in 2 layers.  Patient subsequently developed superficial wound infection.  Currently his colostomy is working.  He has no fever.  He is still on Levaquin.  He is tolerating diet.  He is making good urine into his Hannon catheter.  Has an appointment to have a CT cystogram done on February 2.        The following portions of the patient's history were reviewed and updated as appropriate: allergies, current medications, past family history, past medical history, past social history, past surgical history, and problem list.    Review of Systems   All other systems reviewed and are negative.        Objective:      /76 (BP Location: Right arm, Patient Position: Sitting)   Pulse (!) 118   Temp 98.6 °F (37 °C) (Tympanic)   Resp 18   Ht 5' 11\" (1.803 m)   Wt 68.9 kg (152 lb)   SpO2 98%   BMI 21.20 kg/m²          Physical Exam  Vitals reviewed.   Constitutional:       " Appearance: Normal appearance.   Pulmonary:      Effort: Pulmonary effort is normal.      Breath sounds: Normal breath sounds.   Abdominal:      General: Abdomen is flat. Bowel sounds are normal. There is no distension.      Palpations: Abdomen is soft.      Tenderness: There is no abdominal tenderness.          Comments: Hannon catheter in situ with normal urine.   Neurological:      Mental Status: He is alert.

## 2024-01-24 NOTE — TELEPHONE ENCOUNTER
Alaina called from General Surgery regarding scheduling I informed her of previous documentation and office will be scheduling,no further action required

## 2024-01-24 NOTE — PROGRESS NOTES
Name: Davis Goss      : 1970      MRN: [id number]  Encounter Provider: WES Santillan  Encounter Date: 2024   Encounter department: [facility] MEDICINE    Assessment & Plan     1. Encounter for support and coordination of transition of care    2. Elevated blood pressure reading  Assessment & Plan:  Pt reports compliance with amlodipine 10 mg, BP today in office 130/90 pt reports anxiety secondary to current health issues affecting BP. Pt has [facility] 3 x/week to help monitor BP, counseled on low Na diet and will f/u in 4 weeks at next appt.        3. Pancolitis (HCC)  Assessment & Plan:  Pt with large bowel obstruction s/p Jo's procedure and 8 days of IV abx therapy. Pt was discharged home  24 on oral levofloxacin 750 mg qd has 2 days remaining thru 24, CBC done prior to d/c showed improving leukocytosis, recheck CBC in 2 weeks. Pt denies abdominal pain, fever, colostomy well functioning.       4. Hypokalemia  Assessment & Plan:  Secondary to poor oral intake, potassium levels wnl prior to discharge. Pt denies recent alcohol intake has been tolerating regular diet.      5. Colonic obstruction (HCC)  Assessment & Plan:  S/p Jo' procedure and repair of bladder perforation on 24. Pt is followed by [facility] reports independence with managing appliance. Pt  saw Dr Cheek 24 for stable removal to mid abdominal incision, dressing dry and intact  no sxs of infection, denies pain. Pt has f/u appt scheduled for 24.       6. Other elevated white blood cell (WBC) count  -     CBC and differential; Future; Expected date: 2024    7. Encounter to establish care        Depression Screening and Follow-up Plan: Patient was screened for depression during today's encounter. They screened negative with a PHQ-2 score of 0.        Subjective      TCM Call     Date and time call was made  2024 10:42 AM    Patient was hospitialized at   [facility]    Date of Admission  01/07/24    Date of discharge  01/20/24    Disposition  Home      TCM Call     Post hospital issues  Reduced activity    Should patient be enrolled in anticoag monitoring?  No    Scheduled for follow up?  Yes    Did you obtain your prescribed medications  Yes    Do you need help managing your prescriptions or medications  No    Is transportation to your appointment needed  No      Pt [facility] care and TCM appt s/p hospital admission 01/07/24-01/20/24    Patient presented to the ED on 1/6 for complaints of ongoing constipation accompanied by weight loss of there past year. Workup identified colonic mass pt left AMA but returned on 1/7 for further evaluation. CT A/P showed  Partial colonic obstruction secondary to a 7 cm long apple core lesion of the mid sigmoid colon suspicious for colonic malignancy. CEA done wnl, CT chest no evidence of metastatic disease. Cytology negative for malignancy. Pt had Jo's procedure and repair of bladder perforation (1/9/24). Pt with new benson catheter and colostomy appliance pt was referred to [facility] for post hospital management. Pt reports had visit 1/23/24 and scheduled for f/u 1/26/24. Pt reports has been caring for colostomy and benson independently has adequate output and denies any problems. Per urology pt to maintain benson catheter for two weeks has f/u appt scheduled for 2/2/24 and CT cystogram to be completed on that date. Pt saw Gen geovany Cheek today 01/24/24 for staple removal to mid abdominal surgical incision. Pt denies pain has not had to take prescribed pain medication since hospital discharge, dressing dry and intact f/u appt scheduled for 2/7/24 pt will be completing oral levofloxacin 750 mg on 01/26/24.     Pancolitis -  CT imaging and surgical notes with evidence of colitis.  · Completed 8 days IV antibiotics, switch to oral Augmentin  · Wound cx: + Pseudomonas, E. coli susceptible to  levofloxacin.  continue levofloxacin 750 mg daily x 7 days through 1/25/24 pt states started medication 01/21 will completed Friday 1/26/24    HTN- BP elevated during hospital admission, pt to continue amlodipine 10 mg qd.    Pt provided STD paperwork to be completed by 01/31/24.      Review of Systems   Constitutional:  Negative for activity change, appetite change, chills, diaphoresis, fatigue, fever and unexpected weight change.   HENT:  Negative for congestion, dental problem, drooling, ear discharge, ear pain, facial swelling, hearing loss, mouth sores, nosebleeds, postnasal drip, rhinorrhea, sinus pressure, sinus pain, sneezing, sore throat, tinnitus, trouble swallowing and voice change.    Eyes:  Negative for photophobia, pain, discharge, redness, itching and visual disturbance.   Respiratory:  Negative for apnea, cough, choking, chest tightness, shortness of breath, wheezing and stridor.    Cardiovascular:  Negative for chest pain, palpitations and leg swelling.   Gastrointestinal:  Negative for abdominal distention, abdominal pain, anal bleeding, blood in stool, constipation, diarrhea, nausea and vomiting.   Endocrine: Negative for cold intolerance, heat intolerance, polydipsia, polyphagia and polyuria.   Genitourinary:  Negative for decreased urine volume, difficulty urinating (benson  catheter patent), dysuria, flank pain, frequency, hematuria, penile discharge, penile pain, penile swelling, scrotal swelling, testicular pain and urgency.   Musculoskeletal:  Negative for arthralgias, back pain, gait problem, joint swelling, myalgias, neck pain and neck stiffness.   Skin:  Positive for wound (mid abdominal surgical wound). Negative for color change and rash.   Allergic/Immunologic: Negative for environmental allergies, food allergies and immunocompromised state.   Neurological:  Negative for dizziness, tremors, seizures, syncope, facial asymmetry, weakness, light-headedness, numbness and headaches.  "  Hematological:  Negative for adenopathy. Does not bruise/bleed easily.   Psychiatric/Behavioral:  Negative for agitation, behavioral problems, confusion, decreased concentration, dysphoric mood, hallucinations, self-injury, sleep disturbance and suicidal ideas. The patient is nervous/anxious. The patient is not hyperactive.    All other systems reviewed and are negative.      Current Outpatient Medications on File Prior to Visit   Medication Sig    amLODIPine (NORVASC) 10 mg tablet Take 1 tablet (10 mg total) by mouth daily    levofloxacin (LEVAQUIN) 750 mg tablet Take 1 tablet (750 mg total) by mouth every 24 hours for 5 doses    oxyCODONE (ROXICODONE) 5 immediate release tablet Take 1 tablet (5 mg total) by mouth every 6 (six) hours as needed for severe pain for up to 5 days Max Daily Amount: 20 mg (Patient not taking: Reported on 1/24/2024)       Objective     /90 (BP Location: Left arm, Patient Position: Sitting, Cuff Size: Standard)   Pulse (!) 108   Temp (!) 97 °F (36.1 °C) (Tympanic)   Resp 16   Ht 5' 11\" (1.803 m)   Wt 70.3 kg (155 lb)   SpO2 99%   BMI 21.62 kg/m²     Physical Exam  Vitals and nursing note reviewed.   Constitutional:       General: He is not in acute distress.     Appearance: Normal appearance. He is normal weight. He is not ill-appearing, toxic-appearing or diaphoretic.   HENT:      Head: Normocephalic and atraumatic.      Right Ear: Tympanic membrane, ear canal and external ear normal. There is no impacted cerumen.      Left Ear: Tympanic membrane, ear canal and external ear normal. There is no impacted cerumen.      Nose: Nose normal. No congestion or rhinorrhea.      Mouth/Throat:      Mouth: Mucous membranes are moist.      Pharynx: Oropharynx is clear. No oropharyngeal exudate or posterior oropharyngeal erythema.   Eyes:      General:         Right eye: No discharge.         Left eye: No discharge.      Extraocular Movements: Extraocular movements intact.      " Conjunctiva/sclera: Conjunctivae normal.      Pupils: Pupils are equal, round, and reactive to light.   Neck:      Vascular: No carotid bruit.   Cardiovascular:      Rate and Rhythm: Regular rhythm. Tachycardia present.      Pulses: Normal pulses.      Heart sounds: Normal heart sounds. No murmur heard.  Pulmonary:      Effort: Pulmonary effort is normal. No respiratory distress.      Breath sounds: Normal breath sounds. No wheezing.   Chest:      Chest wall: No tenderness.   Abdominal:      General: A surgical scar is present. The ostomy site is clean. Bowel sounds are normal. There is no distension or abdominal bruit.      Palpations: Abdomen is soft. There is no mass.      Tenderness: There is no abdominal tenderness. There is no right CVA tenderness or left CVA tenderness.      Comments: Mid abdominal surgical incision, DSD intact mild tenderness at incision site.   Genitourinary:     Penis: Normal. No tenderness.       Testes: Normal.      Comments: Hannon catheter patent juan colored urine noted, no clots  Musculoskeletal:         General: No swelling or tenderness. Normal range of motion.      Cervical back: Normal range of motion. No rigidity or tenderness.      Right lower leg: No edema.      Left lower leg: No edema.   Lymphadenopathy:      Cervical: No cervical adenopathy.   Skin:     General: Skin is warm.      Capillary Refill: Capillary refill takes less than 2 seconds.      Coloration: Skin is not jaundiced.      Findings: No bruising, erythema or rash.   Neurological:      General: No focal deficit present.      Mental Status: He is alert and oriented to person, place, and time.      Cranial Nerves: No cranial nerve deficit.      Sensory: No sensory deficit.      Coordination: Coordination normal.      Gait: Gait normal.   Psychiatric:         Mood and Affect: Mood normal.         Behavior: Behavior normal. Behavior is cooperative.         Thought Content: Thought content normal.       Malik  WES Vargas

## 2024-02-02 ENCOUNTER — HOSPITAL ENCOUNTER (OUTPATIENT)
Dept: CT IMAGING | Facility: HOSPITAL | Age: 54
Discharge: HOME | End: 2024-02-02
Payer: COMMERCIAL

## 2024-02-02 DIAGNOSIS — K56.609 COLONIC OBSTRUCTION (HCC): ICD-10-CM

## 2024-02-02 DIAGNOSIS — K63.89 COLONIC MASS: ICD-10-CM

## 2024-02-02 PROCEDURE — G1004 CDSM NDSC: HCPCS

## 2024-02-02 PROCEDURE — 72194 CT PELVIS W/O & W/DYE: CPT

## 2024-02-02 RX ADMIN — IOHEXOL 15 ML: 350 INJECTION, SOLUTION INTRAVENOUS at 10:16

## 2024-02-06 ENCOUNTER — TELEPHONE (OUTPATIENT)
Age: 54
End: 2024-02-06

## 2024-02-06 NOTE — TELEPHONE ENCOUNTER
Elva from [facility] called to let provider know pt had a HR of 120 and pt stated he was anxious form having his ostomy changed.    Pt denied palpitations.

## 2024-02-06 NOTE — TELEPHONE ENCOUNTER
Did HR return to normal after change? What was BP?  If normal HR and BP recommend f/u for anxiety screening and discuss tx,  If pt has sustained elevated HR and is symptomatic would recommend ER evaluation.

## 2024-02-06 NOTE — TELEPHONE ENCOUNTER
Spoke to pt and he feels fine. He stated his HR goes up because he has a lot going on with his health. He is feeling much better and if he feels he needs something for anxiety he will address at he's next visit

## 2024-02-07 ENCOUNTER — OFFICE VISIT (OUTPATIENT)
Dept: SURGERY | Facility: CLINIC | Age: 54
End: 2024-02-07

## 2024-02-07 VITALS
HEIGHT: 71 IN | DIASTOLIC BLOOD PRESSURE: 88 MMHG | OXYGEN SATURATION: 100 % | TEMPERATURE: 97.4 F | HEART RATE: 131 BPM | BODY MASS INDEX: 23.18 KG/M2 | SYSTOLIC BLOOD PRESSURE: 120 MMHG | WEIGHT: 165.6 LBS

## 2024-02-07 DIAGNOSIS — K56.699 LARGE BOWEL STRICTURE (HCC): Primary | ICD-10-CM

## 2024-02-07 PROCEDURE — 99024 POSTOP FOLLOW-UP VISIT: CPT | Performed by: SURGERY

## 2024-02-07 NOTE — PROGRESS NOTES
"Assessment/Plan: Continue regular wound care.  Follow-up with urology.  Follow-up with me in 2 months to plan for stoma reversal.    No problem-specific Assessment & Plan notes found for this encounter.       Diagnoses and all orders for this visit:    Large bowel stricture (HCC)          Subjective:      Patient ID: Davis Goss is a 53 y.o. male.    53-year-old male who is 1 month status post exploratory laparotomy with the repair of bladder with Jo's.  Patient had a CT cystogram but it has not been reported yet.  His stoma is working.  His pain is under control.  He is producing good urine in his Hannon catheter.  He has an appointment to see urology.        The following portions of the patient's history were reviewed and updated as appropriate: allergies, current medications, past family history, past medical history, past social history, past surgical history, and problem list.    Review of Systems   All other systems reviewed and are negative.        Objective:      /88 (BP Location: Left arm, Patient Position: Sitting, Cuff Size: Standard)   Pulse (!) 131   Temp (!) 97.4 °F (36.3 °C) (Tympanic)   Ht 5' 11\" (1.803 m)   Wt 75.1 kg (165 lb 9.6 oz)   SpO2 100%   BMI 23.10 kg/m²          Physical Exam  Vitals reviewed.   HENT:      Head: Normocephalic and atraumatic.      Nose: Nose normal.      Mouth/Throat:      Mouth: Mucous membranes are moist.   Eyes:      Conjunctiva/sclera: Conjunctivae normal.   Cardiovascular:      Rate and Rhythm: Normal rate and regular rhythm.   Abdominal:      General: Bowel sounds are normal.      Palpations: Abdomen is soft.      Hernia: No hernia is present.      Comments: Left lower quadrant colostomy with edges healthy and stool in the bag.  Midline incision is healed about 90%.  2 cm x 2 cm area superficial skin dehiscence with good granulation tissue.  No hernias.  Hannon catheter is present with good amount of urine.   Genitourinary:     Penis: Normal.       " Testes: Normal.   Musculoskeletal:      Cervical back: Normal range of motion.   Neurological:      Mental Status: He is alert.

## 2024-02-09 ENCOUNTER — TELEPHONE (OUTPATIENT)
Age: 54
End: 2024-02-09

## 2024-02-09 NOTE — TELEPHONE ENCOUNTER
Wan from [facility] called to get information about Malik HARVEY and to add her as his PCP.  They are sending out a visiting nurse.

## 2024-02-14 ENCOUNTER — TELEPHONE (OUTPATIENT)
Age: 54
End: 2024-02-14

## 2024-02-14 NOTE — TELEPHONE ENCOUNTER
Alexia from [facility] called.  She said the patient needs to be seen regarding his wound.  I tried to schedule the appt but she didn't know what the patients availably is so she will have him call back

## 2024-02-19 ENCOUNTER — OFFICE VISIT (OUTPATIENT)
Dept: SURGERY | Facility: CLINIC | Age: 54
End: 2024-02-19

## 2024-02-19 VITALS — BODY MASS INDEX: 23.1 KG/M2 | HEIGHT: 71 IN

## 2024-02-19 DIAGNOSIS — K56.699 LARGE BOWEL STRICTURE (HCC): Primary | ICD-10-CM

## 2024-02-19 PROCEDURE — 99024 POSTOP FOLLOW-UP VISIT: CPT | Performed by: SURGERY

## 2024-02-19 NOTE — PROGRESS NOTES
53-year-old who is 40 days status post ex lap with sigmoid colectomy, colostomy, bladder repair.  He still has his Hannon catheter.  He is going to see his urologist on 22nd of this month.  He comes in today because his home care nurse was concerned about his wound.  Patient has no concerns.  There is no drainage.  There is no fever.  There is no pain.  His stoma is working well.    On exam he is alert awake oriented.  In good spirits.  Vital stable.  Abdomen is soft nontender.  The open part of the incision is about 3 cm x 2 cm.  There is hypergranulation tissue.  It looks very healthy and clean.  The stoma is working well.  Rest of the incision is healed.  Hannon is present.    I have no concern about the wound.  Continue daily dressings with the dry gauze.  Follow-up with me in April.  Follow-up with urology.

## 2024-02-21 ENCOUNTER — OFFICE VISIT (OUTPATIENT)
Dept: FAMILY MEDICINE CLINIC | Facility: CLINIC | Age: 54
End: 2024-02-21
Payer: COMMERCIAL

## 2024-02-21 VITALS
BODY MASS INDEX: 24.41 KG/M2 | WEIGHT: 175 LBS | HEART RATE: 120 BPM | DIASTOLIC BLOOD PRESSURE: 85 MMHG | SYSTOLIC BLOOD PRESSURE: 120 MMHG | TEMPERATURE: 98.7 F

## 2024-02-21 DIAGNOSIS — R03.0 ELEVATED BLOOD PRESSURE READING: ICD-10-CM

## 2024-02-21 DIAGNOSIS — Z00.00 ANNUAL PHYSICAL EXAM: Primary | ICD-10-CM

## 2024-02-21 DIAGNOSIS — Z13.220 SCREENING FOR LIPID DISORDERS: ICD-10-CM

## 2024-02-21 DIAGNOSIS — Z13.6 SCREENING FOR CARDIOVASCULAR CONDITION: ICD-10-CM

## 2024-02-21 DIAGNOSIS — F41.8 ANXIETY ABOUT HEALTH: ICD-10-CM

## 2024-02-21 DIAGNOSIS — R00.0 TACHYCARDIA: ICD-10-CM

## 2024-02-21 PROBLEM — R45.89 ANXIETY ABOUT HEALTH: Status: ACTIVE | Noted: 2024-02-21

## 2024-02-21 PROCEDURE — 99396 PREV VISIT EST AGE 40-64: CPT

## 2024-02-21 PROCEDURE — 99214 OFFICE O/P EST MOD 30 MIN: CPT

## 2024-02-21 RX ORDER — AMLODIPINE BESYLATE 10 MG/1
10 TABLET ORAL DAILY
Qty: 90 TABLET | Refills: 1 | Status: SHIPPED | OUTPATIENT
Start: 2024-02-21 | End: 2024-08-19

## 2024-02-21 NOTE — ASSESSMENT & PLAN NOTE
CPE done, check lipid panel, pt has recent labs. Continue healthy eating habits, encouraged to f/u with routine dental visit.

## 2024-02-21 NOTE — PROGRESS NOTES
ADULT ANNUAL PHYSICAL  [facility]    NAME: Davis Goss  AGE: 53 y.o. SEX: male  : 1970     DATE: 2024     Assessment and Plan:     Problem List Items Addressed This Visit          Other    Elevated blood pressure reading     Pt has been taking amlodipine 10 mg BP readings have remained stable. Continue antihypertensive medication.         Relevant Medications    amLODIPine (NORVASC) 10 mg tablet    Annual physical exam - Primary     CPE done, check lipid panel, pt has recent labs. Continue healthy eating habits, encouraged to f/u with routine dental visit.          Tachycardia     Pt reports elevated HR with doctor appointments and changing of colostomy bags secondary to anxiety. Pt is very anxious in office concerned about colostomy bag detaching. Pt declines anxiety medication states does not have chest pain, SOB and will monitor and f/u if needed.         Anxiety about health     Pt reports severe anxiety related recent large bowel obstruction requiring benson and colostomy. Pt is tachycardic denies chest pain, refused medication tx or CBT referral. Discussed relaxation technique pt states will f/u if needed however believes symptoms will improve once able to have reversal for ostomy.          Other Visit Diagnoses       Screening for cardiovascular condition        Screening for lipid disorders        Relevant Orders    Lipid Panel with Direct LDL reflex            Immunizations and preventive care screenings were discussed with patient today. Appropriate education was printed on patient's after visit summary.    Discussed risks and benefits of prostate cancer screening. We discussed the controversial history of PSA screening for prostate cancer in the [place] as well as the risk of over detection and over treatment of prostate cancer by way of PSA screening.  The patient understands that PSA blood testing is an imperfect way  to screen for prostate cancer and that elevated PSA levels in the blood may also be caused by infection, inflammation, prostatic trauma or manipulation, urological procedures, or by benign prostatic enlargement.    The role of the digital rectal examination in prostate cancer screening was also discussed and I discussed with him that there is large interobserver variability in the findings of digital rectal examination.    Counseling:  Alcohol/drug use: discussed moderation in alcohol intake, the recommendations for healthy alcohol use, and avoidance of illicit drug use.  Exercise: the importance of regular exercise/physical activity was discussed. Recommend exercise 3-5 times per week for at least 30 minutes.          Return if symptoms worsen or fail to improve.     Chief Complaint:     No chief complaint on file.     History of Present Illness:     Adult Annual Physical   Patient here for a comprehensive physical exam and f/u for elevated BP. The patient reports no problems  however VNA concerned about elevated HR.    Diet and Physical Activity  Diet/Nutrition: well balanced diet and low carb diet.   Exercise: no formal exercise.      Depression Screening  PHQ-2/9 Depression Screening    Little interest or pleasure in doing things: 0 - not at all  Feeling down, depressed, or hopeless: 0 - not at all  PHQ-2 Score: 0  PHQ-2 Interpretation: Negative depression screen       General Health  Sleep: sleeps well and gets 7-8 hours of sleep on average.   Hearing: normal - bilateral.  Vision: no vision problems.   Dental: no dental visits for >1 year.        Health  Symptoms include: none    Advanced Care Planning  Do you have an advanced directive? no  Do you have a durable medical power of ? no  ACP document given to patient? yes     Review of Systems:     Review of Systems   Constitutional:  Negative for activity change, appetite change, chills, diaphoresis, fatigue, fever and unexpected weight change.   HENT:   Negative for congestion, dental problem, drooling, ear discharge, ear pain, facial swelling, hearing loss, mouth sores, nosebleeds, postnasal drip, rhinorrhea, sinus pressure, sinus pain, sneezing, sore throat, tinnitus, trouble swallowing and voice change.    Eyes:  Negative for photophobia, pain, discharge, redness, itching and visual disturbance.   Respiratory:  Negative for apnea, cough, choking, chest tightness, shortness of breath, wheezing and stridor.    Cardiovascular:  Negative for chest pain, palpitations and leg swelling.   Gastrointestinal:  Negative for abdominal distention, abdominal pain, anal bleeding, blood in stool, constipation, diarrhea, nausea and vomiting.   Endocrine: Negative for cold intolerance, heat intolerance, polydipsia, polyphagia and polyuria.   Genitourinary:  Negative for decreased urine volume, difficulty urinating, dysuria, flank pain, frequency, hematuria, penile discharge, scrotal swelling, testicular pain and urgency.   Musculoskeletal:  Negative for arthralgias, back pain, gait problem, joint swelling, myalgias, neck pain and neck stiffness.   Skin:  Positive for wound. Negative for color change and rash.   Allergic/Immunologic: Negative for environmental allergies, food allergies and immunocompromised state.   Neurological:  Negative for dizziness, tremors, seizures, syncope, facial asymmetry, weakness, light-headedness, numbness and headaches.   Hematological:  Negative for adenopathy. Does not bruise/bleed easily.   Psychiatric/Behavioral:  Negative for agitation, behavioral problems, confusion, decreased concentration, dysphoric mood, hallucinations, self-injury, sleep disturbance and suicidal ideas. The patient is nervous/anxious. The patient is not hyperactive.    All other systems reviewed and are negative.     Past Medical History:     History reviewed. No pertinent past medical history.   Past Surgical History:     Past Surgical History:   Procedure Laterality Date     CT CYSTOGRAM  2/2/2024    HARTMANS PROCEDURE N/A 1/9/2024    Procedure: EXPLORATORY LAPAROTOMY, SIGMOID COLECTOMY AND COLOSTOMY REPAIR OF BLADDER PERFORATION  HARTMANS PROCEDURE;  Surgeon: Robles Cheek MD;  Location:  MAIN OR;  Service: General    IR MIDLINE PLACEMENT  1/16/2024    KNEE SURGERY Right       Family History:     History reviewed. No pertinent family history.   Social History:     Social History     Socioeconomic History    Marital status: Single     Spouse name: None    Number of children: None    Years of education: None    Highest education level: None   Occupational History    None   Tobacco Use    Smoking status: Every Day     Current packs/day: 0.50     Types: Cigarettes    Smokeless tobacco: Never   Vaping Use    Vaping status: Never Used   Substance and Sexual Activity    Alcohol use: Yes     Comment: daily    Drug use: Yes     Types: Cocaine     Comment: last used: 12/25/23    Sexual activity: Yes   Other Topics Concern    None   Social History Narrative    None     Social Determinants of Health     Financial Resource Strain: Not on file   Food Insecurity: Not on file   Transportation Needs: No Transportation Needs (1/21/2024)    Received from [employer]    OASIS : Transportation     Lack of Transportation (Medical): No     Lack of Transportation (Non-Medical): No     Patient Unable or Declines to Respond: No   Physical Activity: Not on file   Stress: Not on file   Social Connections: Feeling Socially Integrated (1/21/2024)    Received from [employer]    OASIS : Social Isolation     Frequency of experiencing loneliness or isolation: Rarely   Intimate Partner Violence: Not on file   Housing Stability: Not on file      Current Medications:     Current Outpatient Medications   Medication Sig Dispense Refill    amLODIPine (NORVASC) 10 mg tablet Take 1 tablet (10 mg total) by mouth daily 90 tablet 1     No current facility-administered medications for this visit.       "Allergies:     Allergies   Allergen Reactions    Penicillins Other (See Comments)     \"I don't know\"  Many years ago as a child      Physical Exam:     /85 (BP Location: Left arm, Patient Position: Sitting, Cuff Size: Standard)   Pulse (!) 120   Temp 98.7 °F (37.1 °C) (Tympanic)   Wt 79.4 kg (175 lb)   BMI 24.41 kg/m²     Physical Exam  Vitals and nursing note reviewed.   Constitutional:       General: He is not in acute distress.     Appearance: Normal appearance. He is underweight. He is not ill-appearing, toxic-appearing or diaphoretic.   HENT:      Head: Normocephalic and atraumatic.      Right Ear: Tympanic membrane, ear canal and external ear normal. There is no impacted cerumen.      Left Ear: Tympanic membrane, ear canal and external ear normal. There is no impacted cerumen.      Nose: Nose normal. No congestion or rhinorrhea.      Mouth/Throat:      Lips: Pink. No lesions.      Mouth: Mucous membranes are moist.      Dentition: Abnormal dentition. Dental caries present. No dental tenderness.      Pharynx: Oropharynx is clear. No oropharyngeal exudate or posterior oropharyngeal erythema.   Eyes:      General: No scleral icterus.        Right eye: No discharge.         Left eye: No discharge.      Extraocular Movements: Extraocular movements intact.      Conjunctiva/sclera: Conjunctivae normal.      Pupils: Pupils are equal, round, and reactive to light.   Neck:      Vascular: No carotid bruit.   Cardiovascular:      Rate and Rhythm: Regular rhythm. Tachycardia present.      Pulses: Normal pulses.      Heart sounds: Normal heart sounds. No murmur heard.  Pulmonary:      Effort: Pulmonary effort is normal. No respiratory distress.      Breath sounds: Normal breath sounds. No stridor. No wheezing, rhonchi or rales.   Chest:      Chest wall: No tenderness.   Abdominal:      General: A surgical scar is present. The ostomy site is clean. Bowel sounds are normal. There is no distension.      Palpations: " Abdomen is soft. There is no mass.      Tenderness: There is no abdominal tenderness. There is no right CVA tenderness, left CVA tenderness, guarding or rebound.      Hernia: No hernia is present.      Comments: Mid abdominal wound good healing, small open area with DSD no drainage.   Genitourinary:     Penis: Normal.       Comments: Hannon catheter patent draining clear yellow urine  Musculoskeletal:         General: No swelling, tenderness, deformity or signs of injury. Normal range of motion.      Cervical back: Normal range of motion and neck supple. No rigidity or tenderness.      Right lower leg: No edema.      Left lower leg: No edema.   Lymphadenopathy:      Cervical: No cervical adenopathy.   Skin:     General: Skin is warm.      Capillary Refill: Capillary refill takes less than 2 seconds.      Coloration: Skin is not jaundiced or pale.      Findings: No bruising, erythema, lesion or rash.   Neurological:      General: No focal deficit present.      Mental Status: He is alert and oriented to person, place, and time.      Cranial Nerves: No cranial nerve deficit.      Sensory: No sensory deficit.      Motor: No weakness.      Coordination: Coordination normal.      Gait: Gait normal.      Deep Tendon Reflexes: Reflexes normal.   Psychiatric:         Attention and Perception: Attention normal. He is attentive.         Mood and Affect: Mood is anxious. Mood is not depressed. Affect is not flat, angry, tearful or inappropriate.         Speech: Speech normal. Speech is not delayed.         Behavior: Behavior is agitated. Behavior is not slowed, aggressive, withdrawn, hyperactive or combative. Behavior is cooperative.         Thought Content: Thought content normal. Thought content is not paranoid or delusional. Thought content does not include homicidal or suicidal ideation. Thought content does not include homicidal or suicidal plan.         Cognition and Memory: Cognition normal.         Judgment: Judgment  normal. Judgment is not impulsive.          WES Santillan  [facility]

## 2024-02-21 NOTE — ASSESSMENT & PLAN NOTE
Pt reports elevated HR with doctor appointments and changing of colostomy bags secondary to anxiety. Pt is very anxious in office concerned about colostomy bag detaching. Pt declines anxiety medication states does not have chest pain, SOB and will monitor and f/u if needed.

## 2024-02-21 NOTE — ASSESSMENT & PLAN NOTE
Pt has been taking amlodipine 10 mg BP readings have remained stable. Continue antihypertensive medication.

## 2024-02-21 NOTE — ASSESSMENT & PLAN NOTE
Pt reports severe anxiety related recent large bowel obstruction requiring benson and colostomy. Pt is tachycardic denies chest pain, refused medication tx or CBT referral. Discussed relaxation technique pt states will f/u if needed however believes symptoms will improve once able to have reversal for ostomy.

## 2024-02-22 ENCOUNTER — OFFICE VISIT (OUTPATIENT)
Dept: UROLOGY | Facility: CLINIC | Age: 54
End: 2024-02-22
Payer: COMMERCIAL

## 2024-02-22 VITALS
SYSTOLIC BLOOD PRESSURE: 142 MMHG | RESPIRATION RATE: 14 BRPM | HEART RATE: 117 BPM | BODY MASS INDEX: 24.44 KG/M2 | DIASTOLIC BLOOD PRESSURE: 102 MMHG | OXYGEN SATURATION: 100 % | HEIGHT: 71 IN | WEIGHT: 174.6 LBS

## 2024-02-22 DIAGNOSIS — Z93.3 STATUS POST COLOSTOMY (HCC): ICD-10-CM

## 2024-02-22 LAB — POST-VOID RESIDUAL VOLUME, ML POC: 43 ML

## 2024-02-22 PROCEDURE — 99203 OFFICE O/P NEW LOW 30 MIN: CPT | Performed by: PHYSICIAN ASSISTANT

## 2024-02-22 PROCEDURE — 51798 US URINE CAPACITY MEASURE: CPT | Performed by: PHYSICIAN ASSISTANT

## 2024-02-22 NOTE — PROGRESS NOTES
"    UROLOGY CONSULTATION NOTE     Patient Identifiers: Davis Goss (MRN: [id number])  Service Requesting Consultation: Marcia Li PA-C    Service Providing Consultation:  Urology, Stephen Pacheco PA-C  Consults  Date of Service: 2/22/2024    Reason for Consultation: Bladder injury trial of voiding    History of Present Illness:     Davis Goss is a 53 y.o. male who had a laparoscopic sigmoid colectomy with colostomy and bladder repair in January.  He still has a Hannon catheter in place.  CT's for cystogram showed no bladder leak.  H&H 10.9 and 32.8.  Creatinine 0.62.  Urine is clear.  No prior urologic issues.  No family history of prostate bladder or kidney disease.    Past Medical, Past Surgical History:   No past medical history on file.:    Past Surgical History:   Procedure Laterality Date    CT CYSTOGRAM  2/2/2024    HARTMANS PROCEDURE N/A 1/9/2024    Procedure: EXPLORATORY LAPAROTOMY, SIGMOID COLECTOMY AND COLOSTOMY REPAIR OF BLADDER PERFORATION  HARTMANS PROCEDURE;  Surgeon: Robles Cheek MD;  Location: [facility] OR;  Service: General    IR MIDLINE PLACEMENT  1/16/2024    KNEE SURGERY Right    :    Medications, Allergies:     Current Outpatient Medications:     amLODIPine (NORVASC) 10 mg tablet, Take 1 tablet (10 mg total) by mouth daily, Disp: 90 tablet, Rfl: 1    Allergies:  Allergies   Allergen Reactions    Penicillins Other (See Comments)     \"I don't know\"  Many years ago as a child   :    Social and Family History:   Social History:   Social History     Tobacco Use    Smoking status: Every Day     Current packs/day: 0.50     Types: Cigarettes    Smokeless tobacco: Never   Vaping Use    Vaping status: Never Used   Substance Use Topics    Alcohol use: Yes     Comment: daily    Drug use: Yes     Types: Cocaine     Comment: last used: 12/25/23   .    Social History     Tobacco Use   Smoking Status Every Day    Current packs/day: 0.50    Types: Cigarettes   Smokeless Tobacco Never "       Family History:  No family history on file.:     Review of Systems:     General: Fever, chills, or night sweats: negative  Cardiac: Negative for chest pain.    Pulmonary: Negative for shortness of breath.  Gastrointestinal: Abdominal pain negative.  Nausea, vomiting, or diarrhea negative,  Genitourinary: See HPI above.  Patient does not have hematuria.  All other systems queried were negative.    Physical Exam:   General: Patient is pleasant and in NAD. Awake and alert  There were no vitals taken for this visit.  HEENT:  Conjunctiva are clear  Constitutional:  pleasant and cooperative     no apparent distress  Cardiac: Peripheral edema: negative  Pulmonary: Non-labored breathing  Abdomen:  Genitourinary: Negative CVA tenderness, negative suprapubic tenderness.  Extremities:  Moves all extremities  Neurological:CNII-XII intact. No numbness or tingling. Essentially non focal neurologic exam  Psychiatric:mood affect and behavior normal  HALEY: in place draining clear yellow urine  no clots     Labs:     Lab Results   Component Value Date    HGB 10.9 (L) 01/20/2024    HCT 32.8 (L) 01/20/2024    WBC 13.45 (H) 01/20/2024     (H) 01/20/2024   ]    Lab Results   Component Value Date    K 4.0 01/19/2024     01/19/2024    CO2 24 01/19/2024    BUN 15 01/19/2024    CREATININE 0.62 01/19/2024    CALCIUM 8.3 (L) 01/19/2024   ]    Imaging:   I personally reviewed the images and report of the following studies, and reviewed them with the patient:   .  IMPRESSION:     1. Postsurgical changes as above. No evidence of bladder leak. See additional comments regarding left bladder wall above.     2. 5.5 x 3.3 cm mixed attenuation slightly hyperdense left pelvic sidewall mass, decreased from prior study in retrospect (previously 8.5 x 4.0 cm), favored to represent postoperative hematoma.     3. Borderline prostatomegaly.      ASSESSMENT:   #1.  Bladder injury due to exploratory lap and adherent colon      PLAN:   -Haley  catheter removed today  -Return to the office for PVR      Thank you for allowing me to participate in this patients’ care.  Please do not hesitate to call with any additional questions.  Stephen Pacheco PA-C

## 2024-03-08 ENCOUNTER — TELEPHONE (OUTPATIENT)
Age: 54
End: 2024-03-08

## 2024-03-08 NOTE — TELEPHONE ENCOUNTER
ELENI: Sydnie bloom [facility] Visiting Nurse # [phone] stated that patient's pulse was 117. Pt was A systematic and a little anxious due to doing wound care .

## 2024-04-01 ENCOUNTER — OFFICE VISIT (OUTPATIENT)
Dept: SURGERY | Facility: CLINIC | Age: 54
End: 2024-04-01

## 2024-04-01 VITALS
SYSTOLIC BLOOD PRESSURE: 130 MMHG | DIASTOLIC BLOOD PRESSURE: 90 MMHG | WEIGHT: 181.4 LBS | OXYGEN SATURATION: 98 % | TEMPERATURE: 96.7 F | HEIGHT: 71 IN | HEART RATE: 102 BPM | BODY MASS INDEX: 25.4 KG/M2

## 2024-04-01 DIAGNOSIS — K56.699 LARGE BOWEL STRICTURE (HCC): Primary | ICD-10-CM

## 2024-04-01 PROCEDURE — 99024 POSTOP FOLLOW-UP VISIT: CPT | Performed by: SURGERY

## 2024-04-01 NOTE — PROGRESS NOTES
"Assessment/Plan: Patient is doing very well.  I told him that we will start preparing for reversal of colostomy.  I am ordering a barium enema.  I am also going to do a colonoscopy through the stoma as well as a flexible sigmoidoscopy prior to doing reversal of Jo's.  This is scheduled for end of May.  He is going to see me next month at that time we can discuss the timing of reversal of the stoma.    No problem-specific Assessment & Plan notes found for this encounter.       There are no diagnoses linked to this encounter.      Subjective:      Patient ID: Davis Goss is a 53 y.o. male.    53-year-old male patient who is almost 3 months status post exploratory laparotomy with sigmoid colectomy with bladder perforation repair with end colostomy.  He is doing well.  His colostomy is working.  No pain.  His midline incision is healed.  His Hannon catheter is out.  He is voiding well.        The following portions of the patient's history were reviewed and updated as appropriate: allergies, current medications, past family history, past medical history, past social history, past surgical history, and problem list.    Review of Systems   All other systems reviewed and are negative.        Objective:      /90 (BP Location: Left arm, Patient Position: Sitting, Cuff Size: Standard)   Pulse 102   Temp (!) 96.7 °F (35.9 °C) (Tympanic)   Ht 5' 11\" (1.803 m)   Wt 82.3 kg (181 lb 6.4 oz)   SpO2 98%   BMI 25.30 kg/m²          Physical Exam  Vitals reviewed.   Constitutional:       Appearance: Normal appearance.   Pulmonary:      Effort: Pulmonary effort is normal.      Breath sounds: Normal breath sounds.   Abdominal:      General: Bowel sounds are normal.      Palpations: Abdomen is soft.      Comments: Incision healed well.  Left lower quadrant colostomy functioning well.   Neurological:      Mental Status: He is alert.           "

## 2024-04-09 ENCOUNTER — TELEPHONE (OUTPATIENT)
Age: 54
End: 2024-04-09

## 2024-04-09 NOTE — TELEPHONE ENCOUNTER
Rajeev from [facility] called in to confirm if we received the patient medical request . I did advise the caller request was received.    Caller called Pulmonary the request is for General surgery -

## 2024-04-15 ENCOUNTER — TELEPHONE (OUTPATIENT)
Age: 54
End: 2024-04-15

## 2024-04-15 NOTE — TELEPHONE ENCOUNTER
Supply company calling and stating that they are faxing over form that needs to be done asap so patient can get medical supplies - very low

## 2024-04-16 ENCOUNTER — TELEPHONE (OUTPATIENT)
Age: 54
End: 2024-04-16

## 2024-04-16 NOTE — TELEPHONE ENCOUNTER
We have the order, however Dr. Cheek not in office this week for signature. Per , we could ask one of the other surgeons in office if they could possibly sign in the meantime. I did call pt, and explained to him the situation, I told him I would ask one of the other surgeons although I cannot guarantee this would be done as that would be up to the other surgeon. I will call him back with an update.

## 2024-04-16 NOTE — TELEPHONE ENCOUNTER
Pt called very upset. He has no colostomy supplies. When he called to get them, they told him he needed a script from Dr. Cheek.   A visiting nurse did come, but she never explained anything to him. She just gave him a sample.  He said he is now missing work due to no supplies. The supply company said they are waiting for a fax?  Please call the pt back asap today.

## 2024-04-16 NOTE — TELEPHONE ENCOUNTER
Dr. Weinstein agreed to sign. Form faxed to [facility] with attached chart notes. I called pt, and let him know this was completed.

## 2024-04-18 ENCOUNTER — TELEPHONE (OUTPATIENT)
Age: 54
End: 2024-04-18

## 2024-04-18 ENCOUNTER — HOSPITAL ENCOUNTER (OUTPATIENT)
Dept: RADIOLOGY | Facility: HOSPITAL | Age: 54
Discharge: HOME | End: 2024-04-18
Attending: SURGERY
Payer: COMMERCIAL

## 2024-04-18 DIAGNOSIS — K56.699 LARGE BOWEL STRICTURE (HCC): ICD-10-CM

## 2024-04-18 PROCEDURE — 74270 X-RAY XM COLON 1CNTRST STD: CPT

## 2024-04-18 RX ADMIN — IOHEXOL 150 ML: 350 INJECTION, SOLUTION INTRAVENOUS at 08:50

## 2024-04-18 NOTE — TELEPHONE ENCOUNTER
Anais from [facility] called with a question from radiologist regarding procedure today. Warm transfer to Darlene

## 2024-05-15 ENCOUNTER — TELEPHONE (OUTPATIENT)
Dept: PREADMISSION TESTING | Facility: HOSPITAL | Age: 54
End: 2024-05-15

## 2024-05-15 NOTE — PRE-PROCEDURE INSTRUCTIONS
Pre-Surgery Instructions:   Medication Instructions    amLODIPine (NORVASC) 10 mg tablet Take day of surgery.      Medication instructions for procedure reviewed. Please use only a sip of water to take your instructed medications 2 hours prior to arrival. Avoid Iron 1 week prior to your colonoscopies. If you use rescue inhalers, please bring on day of procedure. Hold All vitamins/supplements day of procedure     You will receive a call one business day prior to your procedure with an arrival time and hospital directions. If your procedure is scheduled on a Monday, the hospital will be calling you on the Friday prior to your procedure.  If you have not heard from anyone by 8pm, please call the hospital supervisor through the hospital  at [phone]. (Luís [phone]).    Do not drink anything after midnight the night before your procedure except your final colonoscopy prep.  This includes candy, mints, lifesavers, chewing gum, etc. Do not drink alcohol 24hrs before your procedure.       Patient received Bowel prep instructions from GI Office. Bowel Prep Reviewed    Please shower on the day of your procedure. After showering do not use anything on your skin including creams, lotions, powders, make-up, or cologne.  Do not wear contact lenses or artificial eyelashes.  If you wear dentures, please do not use denture glue on day of procedure.    Please bring photo ID and insurance card on day of procedure. Please do not bring any valuables such as money, credit cards, etc. Remove all jewelry including rings and body piercing jewelry.     Wear clean casual clothing that is easy to take on and off. (Preferably loose waistband)    Arrange for a responsible person to drive you to and from the hospital on the day of your procedure. Please confirm the visitor policy for the day of your procedure when you receive your phone call with an arrival time.     Call the GI office at [phone] with any new illnesses,  exposures, or additional questions prior to your procedure. If you need to reschedule for a non medical reason, please do so NO LATER than 3 days prior to your procedure.

## 2024-05-22 NOTE — TELEPHONE ENCOUNTER
Patient calling for clarification on what he cna eat leading up to colonoscopy. Please call patient back

## 2024-05-27 ENCOUNTER — NURSE TRIAGE (OUTPATIENT)
Dept: OTHER | Facility: OTHER | Age: 54
End: 2024-05-27

## 2024-05-27 NOTE — TELEPHONE ENCOUNTER
"Regarding: colonoscopy questions  ----- Message from Deepika JACOBSEN sent at 5/27/2024  4:34 PM EDT -----  Pt states \" I have colonoscopy scheduled tomorrow and I don't understand the direction for the medication I have to take\"    "

## 2024-05-27 NOTE — TELEPHONE ENCOUNTER
"Reason for Disposition  • General information question, no triage required and triager able to answer question    Answer Assessment - Initial Assessment Questions  1. REASON FOR CALL or QUESTION: \"What is your reason for calling today?\" or \"How can I best help you?\" or \"What question do you have that I can help answer?\"      Pt calling in to verify how much Miralax to drink for bowel prep prior to colonoscopy tomorrow. RN verified Miralax dose and instructions with patient.    Protocols used: Information Only Call - No Triage-ADULT-    "

## 2024-05-28 ENCOUNTER — ANESTHESIA (OUTPATIENT)
Dept: GASTROENTEROLOGY | Facility: HOSPITAL | Age: 54
End: 2024-05-28

## 2024-05-28 ENCOUNTER — ANESTHESIA EVENT (OUTPATIENT)
Dept: GASTROENTEROLOGY | Facility: HOSPITAL | Age: 54
End: 2024-05-28

## 2024-05-28 ENCOUNTER — TELEPHONE (OUTPATIENT)
Dept: SURGERY | Facility: CLINIC | Age: 54
End: 2024-05-28

## 2024-05-28 ENCOUNTER — HOSPITAL ENCOUNTER (OUTPATIENT)
Dept: GASTROENTEROLOGY | Facility: HOSPITAL | Age: 54
Setting detail: OUTPATIENT SURGERY
Discharge: HOME | End: 2024-05-28
Attending: SURGERY
Payer: COMMERCIAL

## 2024-05-28 VITALS
RESPIRATION RATE: 14 BRPM | OXYGEN SATURATION: 96 % | TEMPERATURE: 97.6 F | BODY MASS INDEX: 23.51 KG/M2 | HEART RATE: 74 BPM | HEIGHT: 71 IN | DIASTOLIC BLOOD PRESSURE: 76 MMHG | SYSTOLIC BLOOD PRESSURE: 154 MMHG | WEIGHT: 167.9 LBS

## 2024-05-28 DIAGNOSIS — K56.699 LARGE BOWEL STRICTURE (HCC): ICD-10-CM

## 2024-05-28 PROCEDURE — 44388 COLONOSCOPY THRU STOMA SPX: CPT | Performed by: SURGERY

## 2024-05-28 RX ORDER — PROPOFOL 10 MG/ML
INJECTION, EMULSION INTRAVENOUS AS NEEDED
Status: DISCONTINUED | OUTPATIENT
Start: 2024-05-28 | End: 2024-05-28

## 2024-05-28 RX ORDER — SODIUM CHLORIDE, SODIUM LACTATE, POTASSIUM CHLORIDE, CALCIUM CHLORIDE 600; 310; 30; 20 MG/100ML; MG/100ML; MG/100ML; MG/100ML
INJECTION, SOLUTION INTRAVENOUS CONTINUOUS PRN
Status: DISCONTINUED | OUTPATIENT
Start: 2024-05-28 | End: 2024-05-28

## 2024-05-28 RX ADMIN — PROPOFOL 50 MG: 10 INJECTION, EMULSION INTRAVENOUS at 08:44

## 2024-05-28 RX ADMIN — SODIUM CHLORIDE, SODIUM LACTATE, POTASSIUM CHLORIDE, AND CALCIUM CHLORIDE: .6; .31; .03; .02 INJECTION, SOLUTION INTRAVENOUS at 07:42

## 2024-05-28 RX ADMIN — PROPOFOL 50 MG: 10 INJECTION, EMULSION INTRAVENOUS at 08:47

## 2024-05-28 RX ADMIN — PROPOFOL 50 MG: 10 INJECTION, EMULSION INTRAVENOUS at 08:35

## 2024-05-28 RX ADMIN — PROPOFOL 50 MG: 10 INJECTION, EMULSION INTRAVENOUS at 08:38

## 2024-05-28 RX ADMIN — PROPOFOL 100 MG: 10 INJECTION, EMULSION INTRAVENOUS at 08:29

## 2024-05-28 RX ADMIN — PROPOFOL 50 MG: 10 INJECTION, EMULSION INTRAVENOUS at 08:41

## 2024-05-28 RX ADMIN — PROPOFOL 100 MG: 10 INJECTION, EMULSION INTRAVENOUS at 08:32

## 2024-05-28 NOTE — H&P
"Assessment/Plan: Patient is here for a colonoscopy through the colostomy as well as transrectal.  This is in preparation to reversing his colostomy.  He did the bowel prep.  He has no new complaints.      No problem-specific Assessment & Plan notes found for this encounter.         There are no diagnoses linked to this encounter.       Subjective:       Patient ID: Davis Goss is a 53 y.o. male.     53-year-old male patient who is almost 3 months status post exploratory laparotomy with sigmoid colectomy with bladder perforation repair with end colostomy.  He is doing well.  His colostomy is working.  No pain.  His midline incision is healed.  His Hannon catheter is out.  He is voiding well.           The following portions of the patient's history were reviewed and updated as appropriate: allergies, current medications, past family history, past medical history, past social history, past surgical history, and problem list.     Review of Systems   All other systems reviewed and are negative.         Objective:        /90 (BP Location: Left arm, Patient Position: Sitting, Cuff Size: Standard)   Pulse 102   Temp (!) 96.7 °F (35.9 °C) (Tympanic)   Ht 5' 11\" (1.803 m)   Wt 82.3 kg (181 lb 6.4 oz)   SpO2 98%   BMI 25.30 kg/m²             Physical Exam  Vitals reviewed.   Constitutional:       Appearance: Normal appearance.   Pulmonary:      Effort: Pulmonary effort is normal.      Breath sounds: Normal breath sounds.   Abdominal:      General: Bowel sounds are normal.      Palpations: Abdomen is soft.      Comments: Incision healed well.  Left lower quadrant colostomy functioning well.   Neurological:      Mental Status: He is alert.   "

## 2024-05-28 NOTE — ANESTHESIA POSTPROCEDURE EVALUATION
Post-Op Assessment Note    CV Status:  Stable  Pain Score: 0    Pain management: adequate       Mental Status:  Arousable and sleepy   Hydration Status:  Stable   PONV Controlled:  Controlled   Airway Patency:  Patent     Post Op Vitals Reviewed: Yes    No anethesia notable event occurred.    Staff: CRNA               BP   102/63   Temp    Pulse 82   Resp 16   SpO2 99

## 2024-05-28 NOTE — ANESTHESIA PREPROCEDURE EVALUATION
Procedure:  COLONOSCOPY    Relevant Problems   GI/HEPATIC   (+) Colonic obstruction (HCC)   (+) Large bowel obstruction (HCC)      NEURO/PSYCH   (+) Anxiety about health      PULMONARY   (+) Smoking             Anesthesia Plan  ASA Score- 3     Anesthesia Type- IV sedation with anesthesia with ASA Monitors.         Additional Monitors:     Airway Plan:            Plan Factors-    Chart reviewed.                      Induction- intravenous.    Postoperative Plan-         Informed Consent- Anesthetic plan and risks discussed with patient.  I personally reviewed this patient with the CRNA. Discussed and agreed on the Anesthesia Plan with the CRNA..

## 2024-05-28 NOTE — TELEPHONE ENCOUNTER
Attempted to call pt to schedule in office f/u to discuss reversal of colostomy procedure. Pt does not have v/m set up.

## 2024-06-07 ENCOUNTER — OFFICE VISIT (OUTPATIENT)
Dept: SURGERY | Facility: CLINIC | Age: 54
End: 2024-06-07
Payer: COMMERCIAL

## 2024-06-07 VITALS
WEIGHT: 174.6 LBS | TEMPERATURE: 97.4 F | BODY MASS INDEX: 24.44 KG/M2 | HEIGHT: 71 IN | DIASTOLIC BLOOD PRESSURE: 90 MMHG | OXYGEN SATURATION: 98 % | SYSTOLIC BLOOD PRESSURE: 128 MMHG | HEART RATE: 109 BPM

## 2024-06-07 DIAGNOSIS — Z93.3 COLOSTOMY PRESENT (HCC): Primary | ICD-10-CM

## 2024-06-07 PROCEDURE — 99214 OFFICE O/P EST MOD 30 MIN: CPT | Performed by: SURGERY

## 2024-06-07 NOTE — PROGRESS NOTES
Ambulatory Visit  Name: Davis Goss      : 1970      MRN: [id number]  Encounter Provider: Robles Cheek MD  Encounter Date: 2024   Encounter department: [facility] [place]    Assessment & Plan   1. Colostomy present (HCC)  I had a long discussion with the patient.  We discussed the procedure of exploratory laparotomy lysis of adhesion with reversal of colostomy.  He verbalized understanding and signed the consent.  He would need bowel prep and edema through the rectum prior to surgery.  He will undergo PAT.  The surgery is scheduled for 2024.  All the instructions were provided in detail.  We discussed possible complications including infection, bleeding, anastomotic leak, need for recolostomy.  He verbalized understanding.  I discussed with him the risks with healing due to ongoing smoking.  I also counseled him against taking recreational drugs.    History of Present Illness     Davis Goss is a 54 y.o. male who presents to my office to schedule colostomy reversal.  Patient had a colostomy done few months ago due to presence of large bowel obstruction with stricture.  A sigmoid colectomy and end colostomy was performed.  He has no new complaints.  He underwent colonoscopy by me few days ago which did not show any pathology.  We also did a proctoscopy and sigmoidoscopy which showed healthy stump.    Review of Systems   Constitutional: Negative.    HENT: Negative.     Eyes: Negative.    Respiratory: Negative.     Cardiovascular: Negative.    Gastrointestinal: Negative.    Endocrine: Negative.    Genitourinary: Negative.    Musculoskeletal: Negative.    Skin: Negative.    Allergic/Immunologic: Negative.    Neurological: Negative.    Hematological: Negative.    Psychiatric/Behavioral: Negative.       Medical History Reviewed by provider this encounter:  Tobacco  Allergies  Meds  Problems  Med Hx  Surg Hx  Fam Hx       Past Medical History   History reviewed. No  "pertinent past medical history.  Past Surgical History:   Procedure Laterality Date    CT CYSTOGRAM  2/2/2024    HARTMANS PROCEDURE N/A 1/9/2024    Procedure: EXPLORATORY LAPAROTOMY, SIGMOID COLECTOMY AND COLOSTOMY REPAIR OF BLADDER PERFORATION  HARTMANS PROCEDURE;  Surgeon: Robles Cheek MD;  Location:  MAIN OR;  Service: General    IR MIDLINE PLACEMENT  1/16/2024    KNEE SURGERY Right      History reviewed. No pertinent family history.  Current Outpatient Medications on File Prior to Visit   Medication Sig Dispense Refill    amLODIPine (NORVASC) 10 mg tablet Take 1 tablet (10 mg total) by mouth daily 90 tablet 1     No current facility-administered medications on file prior to visit.     Allergies   Allergen Reactions    Penicillins Other (See Comments)     \"I don't know\"  Many years ago as a child      Current Outpatient Medications on File Prior to Visit   Medication Sig Dispense Refill    amLODIPine (NORVASC) 10 mg tablet Take 1 tablet (10 mg total) by mouth daily 90 tablet 1     No current facility-administered medications on file prior to visit.      Social History     Tobacco Use    Smoking status: Every Day     Current packs/day: 0.50     Types: Cigarettes    Smokeless tobacco: Never   Vaping Use    Vaping status: Never Used   Substance and Sexual Activity    Alcohol use: Yes     Comment: daily    Drug use: Yes     Types: Cocaine     Comment: last used: 12/25/23    Sexual activity: Yes     Objective     /90 (BP Location: Left arm, Patient Position: Sitting, Cuff Size: Standard)   Pulse (!) 109   Temp (!) 97.4 °F (36.3 °C) (Tympanic)   Ht 5' 11\" (1.803 m)   Wt 79.2 kg (174 lb 9.6 oz)   SpO2 98%   BMI 24.35 kg/m²     Physical Exam  Vitals reviewed.   Constitutional:       Appearance: Normal appearance.   HENT:      Head: Normocephalic and atraumatic.      Nose: Nose normal.   Eyes:      Pupils: Pupils are equal, round, and reactive to light.   Cardiovascular:      Rate and Rhythm: Normal rate " and regular rhythm.   Pulmonary:      Effort: Pulmonary effort is normal.      Breath sounds: Normal breath sounds.   Abdominal:      General: Bowel sounds are normal. There is no distension.      Palpations: Abdomen is soft.      Tenderness: There is no abdominal tenderness.      Hernia: No hernia is present.      Comments: Left lower quadrant healthy pink colostomy present.   Musculoskeletal:         General: Normal range of motion.      Cervical back: Normal range of motion.   Skin:     General: Skin is warm.   Neurological:      General: No focal deficit present.      Mental Status: He is alert.   Psychiatric:         Mood and Affect: Mood normal.       Administrative Statements   I have spent a total time of 45 minutes on 06/07/24 In caring for this patient including Diagnostic results, Prognosis, Risks and benefits of tx options, Instructions for management, Importance of tx compliance, Risk factor reductions, Impressions, Counseling / Coordination of care, Documenting in the medical record, Reviewing / ordering tests, medicine, procedures  , and Obtaining or reviewing history  .

## 2024-07-31 ENCOUNTER — LAB REQUISITION (OUTPATIENT)
Dept: LAB | Facility: HOSPITAL | Age: 54
End: 2024-07-31
Payer: COMMERCIAL

## 2024-07-31 ENCOUNTER — APPOINTMENT (OUTPATIENT)
Dept: LAB | Facility: HOSPITAL | Age: 54
End: 2024-07-31
Attending: SURGERY
Payer: COMMERCIAL

## 2024-07-31 ENCOUNTER — LAB (OUTPATIENT)
Dept: LAB | Facility: HOSPITAL | Age: 54
End: 2024-07-31
Attending: SURGERY
Payer: COMMERCIAL

## 2024-07-31 DIAGNOSIS — Z01.818 ENCOUNTER FOR OTHER PREPROCEDURAL EXAMINATION: ICD-10-CM

## 2024-07-31 DIAGNOSIS — Z01.818 OTHER SPECIFIED PRE-OPERATIVE EXAMINATION: ICD-10-CM

## 2024-07-31 DIAGNOSIS — Z01.818 ENCOUNTER FOR PREADMISSION TESTING: ICD-10-CM

## 2024-07-31 LAB
ABO GROUP BLD: NORMAL
ALBUMIN SERPL BCG-MCNC: 4.4 G/DL (ref 3.5–5)
ALP SERPL-CCNC: 65 U/L (ref 34–104)
ALT SERPL W P-5'-P-CCNC: 13 U/L (ref 7–52)
ANION GAP SERPL CALCULATED.3IONS-SCNC: 7 MMOL/L (ref 4–13)
AST SERPL W P-5'-P-CCNC: 16 U/L (ref 13–39)
BASOPHILS # BLD AUTO: 0.07 THOUSANDS/ÂΜL (ref 0–0.1)
BASOPHILS NFR BLD AUTO: 1 % (ref 0–1)
BILIRUB SERPL-MCNC: 0.59 MG/DL (ref 0.2–1)
BLD GP AB SCN SERPL QL: NEGATIVE
BUN SERPL-MCNC: 22 MG/DL (ref 5–25)
CALCIUM SERPL-MCNC: 9 MG/DL (ref 8.4–10.2)
CHLORIDE SERPL-SCNC: 107 MMOL/L (ref 96–108)
CO2 SERPL-SCNC: 27 MMOL/L (ref 21–32)
CREAT SERPL-MCNC: 1.18 MG/DL (ref 0.6–1.3)
EOSINOPHIL # BLD AUTO: 0.17 THOUSAND/ÂΜL (ref 0–0.61)
EOSINOPHIL NFR BLD AUTO: 2 % (ref 0–6)
ERYTHROCYTE [DISTWIDTH] IN BLOOD BY AUTOMATED COUNT: 17.2 % (ref 11.6–15.1)
GFR SERPL CREATININE-BSD FRML MDRD: 70 ML/MIN/1.73SQ M
GLUCOSE SERPL-MCNC: 85 MG/DL (ref 65–140)
HCT VFR BLD AUTO: 38.7 % (ref 36.5–49.3)
HGB BLD-MCNC: 12.8 G/DL (ref 12–17)
IMM GRANULOCYTES # BLD AUTO: 0.02 THOUSAND/UL (ref 0–0.2)
IMM GRANULOCYTES NFR BLD AUTO: 0 % (ref 0–2)
LYMPHOCYTES # BLD AUTO: 1.88 THOUSANDS/ÂΜL (ref 0.6–4.47)
LYMPHOCYTES NFR BLD AUTO: 22 % (ref 14–44)
MCH RBC QN AUTO: 28.9 PG (ref 26.8–34.3)
MCHC RBC AUTO-ENTMCNC: 33.1 G/DL (ref 31.4–37.4)
MCV RBC AUTO: 87 FL (ref 82–98)
MONOCYTES # BLD AUTO: 0.75 THOUSAND/ÂΜL (ref 0.17–1.22)
MONOCYTES NFR BLD AUTO: 9 % (ref 4–12)
NEUTROPHILS # BLD AUTO: 5.55 THOUSANDS/ÂΜL (ref 1.85–7.62)
NEUTS SEG NFR BLD AUTO: 66 % (ref 43–75)
NRBC BLD AUTO-RTO: 0 /100 WBCS
PLATELET # BLD AUTO: 215 THOUSANDS/UL (ref 149–390)
PMV BLD AUTO: 10.7 FL (ref 8.9–12.7)
POTASSIUM SERPL-SCNC: 4 MMOL/L (ref 3.5–5.3)
PROT SERPL-MCNC: 7.9 G/DL (ref 6.4–8.4)
RBC # BLD AUTO: 4.43 MILLION/UL (ref 3.88–5.62)
RH BLD: POSITIVE
SODIUM SERPL-SCNC: 141 MMOL/L (ref 135–147)
SPECIMEN EXPIRATION DATE: NORMAL
WBC # BLD AUTO: 8.44 THOUSAND/UL (ref 4.31–10.16)

## 2024-07-31 PROCEDURE — 86850 RBC ANTIBODY SCREEN: CPT | Performed by: SURGERY

## 2024-07-31 PROCEDURE — 80053 COMPREHEN METABOLIC PANEL: CPT

## 2024-07-31 PROCEDURE — 86900 BLOOD TYPING SEROLOGIC ABO: CPT | Performed by: SURGERY

## 2024-07-31 PROCEDURE — 86901 BLOOD TYPING SEROLOGIC RH(D): CPT | Performed by: SURGERY

## 2024-07-31 PROCEDURE — 85025 COMPLETE CBC W/AUTO DIFF WBC: CPT

## 2024-07-31 PROCEDURE — 36415 COLL VENOUS BLD VENIPUNCTURE: CPT

## 2024-07-31 PROCEDURE — 93005 ELECTROCARDIOGRAM TRACING: CPT

## 2024-08-01 NOTE — PRE-PROCEDURE INSTRUCTIONS
Pre-Surgery Instructions:   Medication Instructions    amLODIPine (NORVASC) 10 mg tablet Take day of surgery.    Medication instructions for day surgery reviewed. Please use only a sip of water to take your instructed medications. Avoid all over the counter vitamins, supplements and NSAIDS for one week prior to surgery per anesthesia guidelines. Tylenol is ok to take as needed.     You will receive a call one business day prior to surgery with an arrival time and hospital directions. If your surgery is scheduled on a Monday, the hospital will be calling you on the Friday prior to your surgery. If you have not heard from anyone by 8pm, please call the hospital supervisor through the hospital  at [phone]. ([place] [phone] or [place] [phone]).    Do not eat or drink anything after midnight the night before your surgery, including candy, mints, lifesavers, or chewing gum. Do not drink alcohol 24hrs before your surgery. Try not to smoke at least 24hrs before your surgery.       Follow the pre surgery showering instructions as listed in the “My Surgical Experience Booklet” or otherwise provided by your surgeon's office. Do not use a blade to shave the surgical area 1 week before surgery. It is okay to use a clean electric clippers up to 24 hours before surgery. Do not apply any lotions, creams, including makeup, cologne, deodorant, or perfumes after showering on the day of your surgery. Do not use dry shampoo, hair spray, hair gel, or any type of hair products.     No contact lenses, eye make-up, or artificial eyelashes. Remove nail polish, including gel polish, and any artificial, gel, or acrylic nails if possible. Remove all jewelry including rings and body piercing jewelry.     Wear causal clothing that is easy to take on and off. Consider your type of surgery.    Keep any valuables, jewelry, piercings at home. Please bring any specially ordered equipment (sling, braces) if  indicated.    Arrange for a responsible person to drive you to and from the hospital on the day of your surgery. Please confirm the visitor policy for the day of your procedure when you receive your phone call with an arrival time.     Call the surgeon's office with any new illnesses, exposures, or additional questions prior to surgery.    Please reference your “My Surgical Experience Booklet” for additional information to prepare for your upcoming surgery.

## 2024-08-02 LAB
ATRIAL RATE: 88 BPM
P AXIS: 69 DEGREES
PR INTERVAL: 150 MS
QRS AXIS: 66 DEGREES
QRSD INTERVAL: 86 MS
QT INTERVAL: 370 MS
QTC INTERVAL: 447 MS
T WAVE AXIS: 47 DEGREES
VENTRICULAR RATE: 88 BPM

## 2024-08-02 PROCEDURE — 93010 ELECTROCARDIOGRAM REPORT: CPT | Performed by: INTERNAL MEDICINE

## 2024-08-06 DIAGNOSIS — R03.0 ELEVATED BLOOD PRESSURE READING: ICD-10-CM

## 2024-08-07 RX ORDER — AMLODIPINE BESYLATE 10 MG/1
10 TABLET ORAL DAILY
Qty: 90 TABLET | Refills: 1 | Status: SHIPPED | OUTPATIENT
Start: 2024-08-07 | End: 2025-02-03

## 2024-08-21 ENCOUNTER — ANESTHESIA EVENT (OUTPATIENT)
Dept: PERIOP | Facility: HOSPITAL | Age: 54
DRG: 331 | End: 2024-08-21
Payer: COMMERCIAL

## 2024-08-22 ENCOUNTER — HOSPITAL ENCOUNTER (INPATIENT)
Facility: HOSPITAL | Age: 54
LOS: 6 days | Discharge: HOME | DRG: 331 | End: 2024-08-28
Attending: SURGERY | Admitting: SURGERY
Payer: COMMERCIAL

## 2024-08-22 ENCOUNTER — ANESTHESIA (OUTPATIENT)
Dept: PERIOP | Facility: HOSPITAL | Age: 54
DRG: 331 | End: 2024-08-22
Payer: COMMERCIAL

## 2024-08-22 DIAGNOSIS — I10 HYPERTENSION, UNSPECIFIED TYPE: ICD-10-CM

## 2024-08-22 DIAGNOSIS — Z98.890 HISTORY OF COLOSTOMY REVERSAL: ICD-10-CM

## 2024-08-22 DIAGNOSIS — Z93.3 COLOSTOMY STATUS (HCC): ICD-10-CM

## 2024-08-22 DIAGNOSIS — F17.200 SMOKING: Primary | ICD-10-CM

## 2024-08-22 LAB
APTT PPP: 30 SECONDS (ref 23–34)
INR PPP: 1.16 (ref 0.85–1.19)
PROTHROMBIN TIME: 15.2 SECONDS (ref 12.3–15)

## 2024-08-22 PROCEDURE — 94664 DEMO&/EVAL PT USE INHALER: CPT

## 2024-08-22 PROCEDURE — NC001 PR NO CHARGE: Performed by: PHYSICIAN ASSISTANT

## 2024-08-22 PROCEDURE — 44626 REPAIR BOWEL OPENING: CPT | Performed by: SURGERY

## 2024-08-22 PROCEDURE — 88304 TISSUE EXAM BY PATHOLOGIST: CPT | Performed by: STUDENT IN AN ORGANIZED HEALTH CARE EDUCATION/TRAINING PROGRAM

## 2024-08-22 PROCEDURE — 85610 PROTHROMBIN TIME: CPT | Performed by: SURGERY

## 2024-08-22 PROCEDURE — C9290 INJ, BUPIVACAINE LIPOSOME: HCPCS | Performed by: STUDENT IN AN ORGANIZED HEALTH CARE EDUCATION/TRAINING PROGRAM

## 2024-08-22 PROCEDURE — 0DBP0ZZ EXCISION OF RECTUM, OPEN APPROACH: ICD-10-PCS | Performed by: SURGERY

## 2024-08-22 PROCEDURE — 85730 THROMBOPLASTIN TIME PARTIAL: CPT | Performed by: SURGERY

## 2024-08-22 PROCEDURE — 0DBE0ZZ EXCISION OF LARGE INTESTINE, OPEN APPROACH: ICD-10-PCS | Performed by: SURGERY

## 2024-08-22 RX ORDER — NALOXONE HYDROCHLORIDE 0.4 MG/ML
0.1 INJECTION, SOLUTION INTRAMUSCULAR; INTRAVENOUS; SUBCUTANEOUS
Status: DISCONTINUED | OUTPATIENT
Start: 2024-08-22 | End: 2024-08-28 | Stop reason: HOSPADM

## 2024-08-22 RX ORDER — MIDAZOLAM HYDROCHLORIDE 2 MG/2ML
INJECTION, SOLUTION INTRAMUSCULAR; INTRAVENOUS AS NEEDED
Status: DISCONTINUED | OUTPATIENT
Start: 2024-08-22 | End: 2024-08-22

## 2024-08-22 RX ORDER — CIPROFLOXACIN 2 MG/ML
400 INJECTION, SOLUTION INTRAVENOUS EVERY 12 HOURS
Status: COMPLETED | OUTPATIENT
Start: 2024-08-22 | End: 2024-08-23

## 2024-08-22 RX ORDER — MAGNESIUM HYDROXIDE 1200 MG/15ML
LIQUID ORAL AS NEEDED
Status: DISCONTINUED | OUTPATIENT
Start: 2024-08-22 | End: 2024-08-22 | Stop reason: HOSPADM

## 2024-08-22 RX ORDER — BUPIVACAINE HYDROCHLORIDE 2.5 MG/ML
INJECTION, SOLUTION EPIDURAL; INFILTRATION; INTRACAUDAL
Status: DISCONTINUED | OUTPATIENT
Start: 2024-08-22 | End: 2024-08-22

## 2024-08-22 RX ORDER — FENTANYL CITRATE 50 UG/ML
INJECTION, SOLUTION INTRAMUSCULAR; INTRAVENOUS AS NEEDED
Status: DISCONTINUED | OUTPATIENT
Start: 2024-08-22 | End: 2024-08-22

## 2024-08-22 RX ORDER — ACETAMINOPHEN 10 MG/ML
1000 INJECTION, SOLUTION INTRAVENOUS ONCE
Status: COMPLETED | OUTPATIENT
Start: 2024-08-22 | End: 2024-08-22

## 2024-08-22 RX ORDER — ONDANSETRON 2 MG/ML
INJECTION INTRAMUSCULAR; INTRAVENOUS AS NEEDED
Status: DISCONTINUED | OUTPATIENT
Start: 2024-08-22 | End: 2024-08-22

## 2024-08-22 RX ORDER — DEXAMETHASONE SODIUM PHOSPHATE 10 MG/ML
INJECTION, SOLUTION INTRAMUSCULAR; INTRAVENOUS AS NEEDED
Status: DISCONTINUED | OUTPATIENT
Start: 2024-08-22 | End: 2024-08-22

## 2024-08-22 RX ORDER — PROPOFOL 10 MG/ML
INJECTION, EMULSION INTRAVENOUS AS NEEDED
Status: DISCONTINUED | OUTPATIENT
Start: 2024-08-22 | End: 2024-08-22

## 2024-08-22 RX ORDER — METOCLOPRAMIDE HYDROCHLORIDE 5 MG/ML
10 INJECTION INTRAMUSCULAR; INTRAVENOUS ONCE AS NEEDED
Status: DISCONTINUED | OUTPATIENT
Start: 2024-08-22 | End: 2024-08-22 | Stop reason: HOSPADM

## 2024-08-22 RX ORDER — SODIUM CHLORIDE, SODIUM LACTATE, POTASSIUM CHLORIDE, CALCIUM CHLORIDE 600; 310; 30; 20 MG/100ML; MG/100ML; MG/100ML; MG/100ML
125 INJECTION, SOLUTION INTRAVENOUS CONTINUOUS
Status: DISCONTINUED | OUTPATIENT
Start: 2024-08-22 | End: 2024-08-27

## 2024-08-22 RX ORDER — LIDOCAINE HYDROCHLORIDE 20 MG/ML
INJECTION, SOLUTION EPIDURAL; INFILTRATION; INTRACAUDAL; PERINEURAL AS NEEDED
Status: DISCONTINUED | OUTPATIENT
Start: 2024-08-22 | End: 2024-08-22

## 2024-08-22 RX ORDER — ACETAMINOPHEN 10 MG/ML
1000 INJECTION, SOLUTION INTRAVENOUS EVERY 6 HOURS PRN
Status: DISCONTINUED | OUTPATIENT
Start: 2024-08-22 | End: 2024-08-23

## 2024-08-22 RX ORDER — METRONIDAZOLE 500 MG/100ML
500 INJECTION, SOLUTION INTRAVENOUS ONCE
Status: COMPLETED | OUTPATIENT
Start: 2024-08-22 | End: 2024-08-22

## 2024-08-22 RX ORDER — SODIUM CHLORIDE, SODIUM LACTATE, POTASSIUM CHLORIDE, CALCIUM CHLORIDE 600; 310; 30; 20 MG/100ML; MG/100ML; MG/100ML; MG/100ML
INJECTION, SOLUTION INTRAVENOUS CONTINUOUS PRN
Status: DISCONTINUED | OUTPATIENT
Start: 2024-08-22 | End: 2024-08-22

## 2024-08-22 RX ORDER — ROCURONIUM BROMIDE 10 MG/ML
INJECTION, SOLUTION INTRAVENOUS AS NEEDED
Status: DISCONTINUED | OUTPATIENT
Start: 2024-08-22 | End: 2024-08-22

## 2024-08-22 RX ORDER — KETAMINE HCL IN NACL, ISO-OSM 100MG/10ML
SYRINGE (ML) INJECTION AS NEEDED
Status: DISCONTINUED | OUTPATIENT
Start: 2024-08-22 | End: 2024-08-22

## 2024-08-22 RX ORDER — HYDROMORPHONE HCL/PF 1 MG/ML
SYRINGE (ML) INJECTION AS NEEDED
Status: DISCONTINUED | OUTPATIENT
Start: 2024-08-22 | End: 2024-08-22

## 2024-08-22 RX ORDER — FENTANYL CITRATE/PF 50 MCG/ML
50 SYRINGE (ML) INJECTION
Status: COMPLETED | OUTPATIENT
Start: 2024-08-22 | End: 2024-08-22

## 2024-08-22 RX ORDER — HYDROMORPHONE HCL/PF 1 MG/ML
0.5 SYRINGE (ML) INJECTION
Status: DISCONTINUED | OUTPATIENT
Start: 2024-08-22 | End: 2024-08-22 | Stop reason: HOSPADM

## 2024-08-22 RX ORDER — ONDANSETRON 2 MG/ML
4 INJECTION INTRAMUSCULAR; INTRAVENOUS ONCE AS NEEDED
Status: DISCONTINUED | OUTPATIENT
Start: 2024-08-22 | End: 2024-08-22 | Stop reason: HOSPADM

## 2024-08-22 RX ORDER — CIPROFLOXACIN 2 MG/ML
400 INJECTION, SOLUTION INTRAVENOUS ONCE
Status: COMPLETED | OUTPATIENT
Start: 2024-08-22 | End: 2024-08-22

## 2024-08-22 RX ORDER — METRONIDAZOLE 500 MG/100ML
500 INJECTION, SOLUTION INTRAVENOUS EVERY 8 HOURS
Status: COMPLETED | OUTPATIENT
Start: 2024-08-22 | End: 2024-08-23

## 2024-08-22 RX ORDER — AMLODIPINE BESYLATE 10 MG/1
10 TABLET ORAL DAILY
Status: DISCONTINUED | OUTPATIENT
Start: 2024-08-22 | End: 2024-08-28 | Stop reason: HOSPADM

## 2024-08-22 RX ORDER — HEPARIN SODIUM 5000 [USP'U]/ML
5000 INJECTION, SOLUTION INTRAVENOUS; SUBCUTANEOUS EVERY 8 HOURS SCHEDULED
Status: DISCONTINUED | OUTPATIENT
Start: 2024-08-23 | End: 2024-08-28 | Stop reason: HOSPADM

## 2024-08-22 RX ORDER — ONDANSETRON 2 MG/ML
4 INJECTION INTRAMUSCULAR; INTRAVENOUS EVERY 6 HOURS PRN
Status: DISCONTINUED | OUTPATIENT
Start: 2024-08-22 | End: 2024-08-28 | Stop reason: HOSPADM

## 2024-08-22 RX ORDER — BUPIVACAINE HYDROCHLORIDE AND EPINEPHRINE 2.5; 5 MG/ML; UG/ML
INJECTION, SOLUTION INFILTRATION; PERINEURAL AS NEEDED
Status: DISCONTINUED | OUTPATIENT
Start: 2024-08-22 | End: 2024-08-22 | Stop reason: HOSPADM

## 2024-08-22 RX ADMIN — ROCURONIUM BROMIDE 10 MG: 10 INJECTION, SOLUTION INTRAVENOUS at 11:06

## 2024-08-22 RX ADMIN — FENTANYL CITRATE 50 MCG: 50 INJECTION INTRAMUSCULAR; INTRAVENOUS at 13:28

## 2024-08-22 RX ADMIN — SODIUM CHLORIDE, SODIUM LACTATE, POTASSIUM CHLORIDE, AND CALCIUM CHLORIDE 125 ML/HR: .6; .31; .03; .02 INJECTION, SOLUTION INTRAVENOUS at 21:48

## 2024-08-22 RX ADMIN — DEXMEDETOMIDINE HYDROCHLORIDE 4 MCG: 100 INJECTION, SOLUTION INTRAVENOUS at 08:19

## 2024-08-22 RX ADMIN — HEPARIN SODIUM 5000 UNITS: 5000 INJECTION, SOLUTION INTRAVENOUS; SUBCUTANEOUS at 23:47

## 2024-08-22 RX ADMIN — SODIUM CHLORIDE, SODIUM LACTATE, POTASSIUM CHLORIDE, AND CALCIUM CHLORIDE 500 ML: .6; .31; .03; .02 INJECTION, SOLUTION INTRAVENOUS at 13:18

## 2024-08-22 RX ADMIN — METRONIDAZOLE 500 MG: 500 INJECTION, SOLUTION INTRAVENOUS at 23:47

## 2024-08-22 RX ADMIN — SUGAMMADEX 200 MG: 100 INJECTION, SOLUTION INTRAVENOUS at 11:37

## 2024-08-22 RX ADMIN — DEXAMETHASONE SODIUM PHOSPHATE 10 MG: 10 INJECTION, SOLUTION INTRAMUSCULAR; INTRAVENOUS at 07:35

## 2024-08-22 RX ADMIN — METRONIDAZOLE: 500 INJECTION, SOLUTION INTRAVENOUS at 07:50

## 2024-08-22 RX ADMIN — CIPROFLOXACIN: 2 INJECTION INTRAVENOUS at 07:30

## 2024-08-22 RX ADMIN — ROCURONIUM BROMIDE 20 MG: 10 INJECTION, SOLUTION INTRAVENOUS at 09:37

## 2024-08-22 RX ADMIN — Medication 20 MG: at 07:51

## 2024-08-22 RX ADMIN — MIDAZOLAM HYDROCHLORIDE 2 MG: 1 INJECTION, SOLUTION INTRAMUSCULAR; INTRAVENOUS at 07:30

## 2024-08-22 RX ADMIN — DEXMEDETOMIDINE HYDROCHLORIDE 8 MCG: 100 INJECTION, SOLUTION INTRAVENOUS at 08:24

## 2024-08-22 RX ADMIN — DEXMEDETOMIDINE HYDROCHLORIDE 8 MCG: 100 INJECTION, SOLUTION INTRAVENOUS at 07:47

## 2024-08-22 RX ADMIN — LIDOCAINE HYDROCHLORIDE 100 MG: 20 INJECTION, SOLUTION EPIDURAL; INFILTRATION; INTRACAUDAL; PERINEURAL at 07:35

## 2024-08-22 RX ADMIN — SODIUM CHLORIDE, SODIUM LACTATE, POTASSIUM CHLORIDE, AND CALCIUM CHLORIDE: .6; .31; .03; .02 INJECTION, SOLUTION INTRAVENOUS at 07:03

## 2024-08-22 RX ADMIN — HYDROMORPHONE HYDROCHLORIDE 0.5 MG: 1 INJECTION, SOLUTION INTRAMUSCULAR; INTRAVENOUS; SUBCUTANEOUS at 11:17

## 2024-08-22 RX ADMIN — SODIUM CHLORIDE, SODIUM LACTATE, POTASSIUM CHLORIDE, AND CALCIUM CHLORIDE: .6; .31; .03; .02 INJECTION, SOLUTION INTRAVENOUS at 07:48

## 2024-08-22 RX ADMIN — DEXMEDETOMIDINE HYDROCHLORIDE 8 MCG: 100 INJECTION, SOLUTION INTRAVENOUS at 07:35

## 2024-08-22 RX ADMIN — FENTANYL CITRATE 50 MCG: 50 INJECTION INTRAMUSCULAR; INTRAVENOUS at 14:02

## 2024-08-22 RX ADMIN — HYDROMORPHONE HYDROCHLORIDE: 10 INJECTION, SOLUTION INTRAMUSCULAR; INTRAVENOUS; SUBCUTANEOUS at 12:55

## 2024-08-22 RX ADMIN — ROCURONIUM BROMIDE 50 MG: 10 INJECTION, SOLUTION INTRAVENOUS at 07:35

## 2024-08-22 RX ADMIN — PROPOFOL 200 MG: 10 INJECTION, EMULSION INTRAVENOUS at 07:35

## 2024-08-22 RX ADMIN — SODIUM CHLORIDE, SODIUM LACTATE, POTASSIUM CHLORIDE, AND CALCIUM CHLORIDE: .6; .31; .03; .02 INJECTION, SOLUTION INTRAVENOUS at 11:00

## 2024-08-22 RX ADMIN — BUPIVACAINE HYDROCHLORIDE 30 ML: 2.5 INJECTION, SOLUTION EPIDURAL; INFILTRATION; INTRACAUDAL at 11:50

## 2024-08-22 RX ADMIN — FENTANYL CITRATE 50 MCG: 50 INJECTION INTRAMUSCULAR; INTRAVENOUS at 07:35

## 2024-08-22 RX ADMIN — DEXMEDETOMIDINE HYDROCHLORIDE 8 MCG: 100 INJECTION, SOLUTION INTRAVENOUS at 08:30

## 2024-08-22 RX ADMIN — PHENYLEPHRINE HYDROCHLORIDE 30 MCG/MIN: 10 INJECTION INTRAVENOUS at 08:05

## 2024-08-22 RX ADMIN — FENTANYL CITRATE 50 MCG: 50 INJECTION INTRAMUSCULAR; INTRAVENOUS at 09:00

## 2024-08-22 RX ADMIN — Medication 30 MG: at 07:35

## 2024-08-22 RX ADMIN — ONDANSETRON 4 MG: 2 INJECTION INTRAMUSCULAR; INTRAVENOUS at 11:16

## 2024-08-22 RX ADMIN — METRONIDAZOLE 500 MG: 500 INJECTION, SOLUTION INTRAVENOUS at 16:53

## 2024-08-22 RX ADMIN — SODIUM CHLORIDE, SODIUM LACTATE, POTASSIUM CHLORIDE, AND CALCIUM CHLORIDE 500 ML: .6; .31; .03; .02 INJECTION, SOLUTION INTRAVENOUS at 12:53

## 2024-08-22 RX ADMIN — SODIUM CHLORIDE, SODIUM LACTATE, POTASSIUM CHLORIDE, AND CALCIUM CHLORIDE 125 ML/HR: .6; .31; .03; .02 INJECTION, SOLUTION INTRAVENOUS at 13:58

## 2024-08-22 RX ADMIN — ACETAMINOPHEN 1000 MG: 10 INJECTION INTRAVENOUS at 11:19

## 2024-08-22 RX ADMIN — BUPIVACAINE 20 ML: 13.3 INJECTION, SUSPENSION, LIPOSOMAL INFILTRATION at 11:50

## 2024-08-22 RX ADMIN — CIPROFLOXACIN 400 MG: 400 INJECTION, SOLUTION INTRAVENOUS at 19:56

## 2024-08-22 RX ADMIN — ROCURONIUM BROMIDE 20 MG: 10 INJECTION, SOLUTION INTRAVENOUS at 08:58

## 2024-08-22 NOTE — ANESTHESIA POSTPROCEDURE EVALUATION
Post-Op Assessment Note    CV Status:  Stable  Pain Score: 0    Pain management: adequate    Multimodal analgesia used between 6 hours prior to anesthesia start to PACU discharge    Mental Status:  Alert and awake   Hydration Status:  Stable   PONV Controlled:  None   Airway Patency:  Patent  Airway: intubated  Two or more mitigation strategies used for obstructive sleep apnea   Post Op Vitals Reviewed: Yes    No anethesia notable event occurred.    Staff: KENNETH               BP 97/58      Temp 98.9      Pulse 80     Resp 18      SpO2 100

## 2024-08-22 NOTE — RESPIRATORY THERAPY NOTE
RT Protocol Note  Davis Goss 54 y.o. male MRN: [id number]  Unit/Bed#: -01 Encounter: [id number]    Assessment    Principal Problem:    Colostomy status (HCC)      Home Pulmonary Medications:  None       History reviewed. No pertinent past medical history.  Social History     Socioeconomic History    Marital status: Single     Spouse name: None    Number of children: None    Years of education: None    Highest education level: None   Occupational History    None   Tobacco Use    Smoking status: Every Day     Current packs/day: 0.50     Types: Cigarettes    Smokeless tobacco: Never   Vaping Use    Vaping status: Never Used   Substance and Sexual Activity    Alcohol use: Yes     Alcohol/week: 7.0 standard drinks of alcohol     Types: 7 Cans of beer per week     Comment: daily    Drug use: Yes     Types: Cocaine     Comment: last used: 12/25/23    Sexual activity: Yes   Other Topics Concern    None   Social History Narrative    None     Social Determinants of Health     Financial Resource Strain: Not on file   Food Insecurity: Not on file   Transportation Needs: No Transportation Needs (3/15/2024)    Received from [facility]    OASIS : Transportation     Lack of Transportation (Medical): No     Lack of Transportation (Non-Medical): No     Patient Unable or Declines to Respond: No   Physical Activity: Not on file   Stress: Not on file   Social Connections: Feeling Socially Integrated (3/15/2024)    Received from [email or web address]    OASIS : Social Isolation     Frequency of experiencing loneliness or isolation: Never   Intimate Partner Violence: Not on file   Housing Stability: Not on file       Subjective         Objective    Physical Exam:   Assessment Type: (P) Assess only  Respiratory Pattern: (P) Normal  Chest Assessment: (P) Chest expansion symmetrical  Bilateral Breath Sounds: (P) Diminished    Vitals:  Blood pressure 120/86, pulse 75, temperature 98 °F (36.7  "°C), temperature source Tympanic, resp. rate 15, height 5' 11\" (1.803 m), weight 76.6 kg (168 lb 14 oz), SpO2 96%.          Imaging and other studies: I have personally reviewed pertinent reports.            Plan    Respiratory Plan: (P) No distress/Pulmonary history        Resp Comments: (P) Assessed pt per protocol. Pt has no pulmonary history. Post-op pt. Capnography ordered but not required at this time. Pt is on the Patient Safety Network and on RA. Respiratory to follow if needed.   "

## 2024-08-22 NOTE — ANESTHESIA PREPROCEDURE EVALUATION
Procedure:  EXPLORATORY LAPAROTOMY (Abdomen)  LYSIS ADHESIONS (Abdomen)  REVERSAL OF COLOSTOMY (Abdomen)    Relevant Problems   GI/HEPATIC   (+) Colonic obstruction (HCC)   (+) Large bowel obstruction (HCC)      NEURO/PSYCH   (+) Anxiety about health      PULMONARY   (+) Smoking     >4 METS, denies any SOB, CP, RODRÍGUEZ.   Currently daily smoker, cigarettes & marijuana. Last smoke was 48 hours ago.     Echo 1/2024:     Left Ventricle: Left ventricular cavity size is normal. Wall thickness is increased. There is mild to moderate concentric hypertrophy. The left ventricular ejection fraction is 65%. Systolic function is normal. Wall motion is normal. Diastolic function is normal.    Left Atrium: The atrium is mildly dilated.    Tricuspid Valve: There is mild regurgitation.    Aorta: The aortic root is mildly dilated. The aortic root is 4.10 cm.    Consented for Bilateral TAP blocks and rectus sheath blocks with Exparel.     Lab Results   Component Value Date    WBC 8.44 07/31/2024    HGB 12.8 07/31/2024    HCT 38.7 07/31/2024    MCV 87 07/31/2024     07/31/2024     Lab Results   Component Value Date    SODIUM 141 07/31/2024    K 4.0 07/31/2024     07/31/2024    CO2 27 07/31/2024    BUN 22 07/31/2024    CREATININE 1.18 07/31/2024    GLUC 85 07/31/2024    CALCIUM 9.0 07/31/2024     Lab Results   Component Value Date    INR 1.35 (H) 01/08/2024    PROTIME 16.5 (H) 01/08/2024     Lab Results   Component Value Date    HGBA1C 5.5 01/08/2024          Physical Exam    Airway    Mallampati score: II  TM Distance: >3 FB  Neck ROM: full     Dental   Comment: Eroded and missing dentition throughout; no loose teeth per patient     Cardiovascular      Pulmonary      Other Findings        Anesthesia Plan  ASA Score- 3     Anesthesia Type- general with ASA Monitors.         Additional Monitors:     Airway Plan: ETT.           Plan Factors-Exercise tolerance (METS): >4 METS.    Chart reviewed.   Existing labs reviewed. Patient  summary reviewed.    Patient is a current smoker.  Patient instructed to abstain from smoking on day of procedure. Patient did not smoke on day of surgery.    Obstructive sleep apnea risk education given perioperatively.        Induction- intravenous.    Postoperative Plan- Plan for postoperative opioid use. Planned trial extubation    Perioperative Resuscitation Plan - Level 1 - Full Code.       Informed Consent- Anesthetic plan and risks discussed with patient.  I personally reviewed this patient with the CRNA. Discussed and agreed on the Anesthesia Plan with the CRNA..

## 2024-08-22 NOTE — RESPIRATORY THERAPY NOTE
Capnography is not required at this time per Nursing Sup. Pt will be place on the PSN. Currently on RA and no respiratory distress.

## 2024-08-22 NOTE — OP NOTE
OPERATIVE REPORT  PATIENT NAME: Davis Goss    :  1970  MRN: [id number]  Pt Location: [facility] OR ROOM 02    SURGERY DATE: 2024    Surgeons and Role:     * Robles Cheek MD - Primary     * David Weinstein MD - Assisting     * Sandra Alexander PA-C - Assisting    Preop Diagnosis:  Colostomy status (HCC) [Z93.3]    Post-Op Diagnosis Codes:     * Colostomy status (HCC) [Z93.3]    Procedure(s):  EXPLORATORY LAPAROTOMY  LYSIS ADHESIONS  REVERSAL OF COLOSTOMY    Specimen(s):  ID Type Source Tests Collected by Time Destination   1 : COLOSTOMY STUMP Tissue Colon TISSUE EXAM Robles Cheek MD 2024 0932    2 : RECTAL STUMP Tissue Soft Tissue, Other TISSUE EXAM Robles Cheek MD 2024 1016    3 : ANASTOMOSIS RINGS Tissue Colon TISSUE EXAM Robles Cheek MD 2024 1020        Estimated Blood Loss:   Minimal    Drains:  Closed/Suction Drain Right Abdomen Bulb 15 Fr. (Active)   Number of days: 226       Closed/Suction Drain Lateral;Right Abdomen Bulb 15 Fr. (Active)   Number of days: 226       Closed/Suction Drain Anterior;Left Hip Bulb 15 Fr. (Active)   Dressing Status Clean;Dry 24 1055   Number of days: 0       NG/OG/Enteral Tube Nasogastric 16 Fr Right nare (Active)   Number of days: 223       Colostomy LLQ (Active)   Number of days: 226       Urethral Catheter Latex 16 Fr. (Active)   Number of days: 226       Urethral Catheter Latex 16 Fr. (Active)   Site Assessment Clean 24 0823   Number of days: 0       Anesthesia Type:   General    Operative Indications:  Colostomy status (HCC) [Z93.3]      Operative Findings:  Extensive adhesions between the small bowel and the anterior abdominal wall.  Multiple interloop adhesions and bands present between the small bowel.  Healthy rectal stump.  Healthy colostomy.      Complications:   None    Procedure and Technique:  The patient was brought to the operating room.  He was placed in lithotomy position and general anesthesia was given  using a ET-tube.  NG tube and Hannon catheter were inserted.  Parts prepped and draped in standard fashion.  Patient received preoperative IV antibiotics.  I did the irrigation of the rectal stump using saline.  Timeout was performed in an appropriate fashion.  I sutured the colostomy site in running fashion using 0 silk at the mucocutaneous junction so that it was sealed.  A sterile dressing was then performed using Tegaderm.  Prepping was done over the sterile waterproof dressing.  A midline incision was then made utilizing the previous scar.  It was deepened through the subcutaneous tissue and midline fascia.  The fascia was opened carefully.  There was adhesions of small bowel to the fascia which were taken down sharply using scissors.  Some bowel was adhered to the rectal stump which was also taken down sharply using scissors.  The bowel then was retracted and the right upper quadrant.  A Cody retractor was then placed.  We mobilized the rectal stump using blunt and sharp dissection.  Monopolar rectal cautery was used.  At this point we paid attention towards taking down the colostomy.  An elliptical incision was made around the closed colostomy site.  The dissection was carried down up to the fascia.  This was done using electrocautery.  The colon was then completely freed from the fascia and was invaginated within the peritoneal cavity.  The ends of the bowel were freshened by firing a 75 BONNIE stapler using a blue load.  A pursestring or was then applied.  Pursestring sutures were applied using silk on a Vadim needle.  The dilators were used to dilate the proximal end to 29 mm.  A 29 mm EEA was chosen because the dilator was going comfortably.  The anvil was then placed and the pursestring suture was tied.  Appendix is epiploicae were taken ofF this portion of the colon to allow area for a good anastomosis.  At this point Dr. Weinstein went to do the perineal part of the procedure.  He dilated the rectum to 29  mm with the dilator.  At this point I felt that the rectal stump was too long and should be excised.  I made a window in the mesorectum.  A contour stapler was placed using green load and was used to resect the rectal stump.  I was at that point satisfied with the length of the rectal stump for a tension-free anastomosis.  The mesentery was taken down using Enseal device.  The specimen was sent for pathology.  It was labeled as rectal stump.  We again introduced the EEA stapler through the rectum.  It was coming easily to the anterior wall of the rectal stump.  An end-to-end anastomosis was then performed using 29 mm EEA stapler.  A leak test was performed which showed air leak to be positive.  It was felt that the leak was coming from the posterior part of the staple line.  I took continuous 3-0 Prolene suture and 2 interrupted Prolene suture to close that area.  This led to air leak test to be negative.  It was felt that this leak was due to her diverticula present in the vicinity of the staple line.  This was due to the fact that we had to complete donuts retrieved from the stapler.  We were able to then pass the colonoscope easily across the anastomosis.  The anastomosis was intact.  Air leak test was again checked and it was negative.  A 15 [place] JOE drain was then placed in the left lower quadrant and the end was placed right next to the anastomosis.  Further lysis of adhesion of the small bowel was carried out.  The bowel was run from the ligament of Treitz to the ileocecal junction and it was completely free and not kinked.  At this time we employed the colon bundle for closure.  The fascia at the original colostomy site was closed in running fashion using single-stranded #1 Prolene.  Irrigation was performed.  The midline incision was closed in a running fashion using looped PDS.  Some interrupted 3-0 Vicryl sutures were taken in the subcuticular tissue at both the sides to bury the knots.  5 TelfA strips  were placed in subcutaneous tissue at the original colostomy site incision.  It was loosely closed using staples.  The midline was closed using staples.  Drain dressing, wound dressings were applied.  The patient then received a tap block from the anesthesia team.  He was recovered from anesthesia and then taken to recovery under stable condition.   I was present for the entire procedure., A qualified resident physician was not available., and A physician assistant was required during the procedure for retraction, tissue handling, dissection and suturing.  Dr Weinstein was needed for perineal part of the operation and for doing intra operative colonoscopy with leak test  Patient Disposition:  PACU     This procedure was not performed to treat colon cancer through resection      SIGNATURE: Robles Cheek MD  DATE: August 22, 2024  TIME: 12:03 PM

## 2024-08-22 NOTE — H&P
History and Physical - General Surgery   Davis Goss 54 y.o. male MRN: [id number]  Unit/Bed#: OR [place] Encounter: [id number]    Assessment & Plan   53 y/o male s/p Jo's procedure 1/9/24, now presents for ex lap with RONA and colostomy reversal  Pt reports that he is feeling well, no acute illness or complaints  Colostomy healthy and functional  Abdomen soft, non-distended, non-tender  AFVSS  Labs from 7/31 WNL    Exploratory laparotomy, lysis of adhesions, colostomy reversal today with Dr. Cheek      HPI:  Davis Goss is a 54 y.o. male with PMH HTN who presents for exploratory laparotomy, lysis of adhesions, colostomy reversal.        Review of Systems   Constitutional:  Negative for chills and fever.   Respiratory:  Negative for shortness of breath.    Cardiovascular:  Negative for chest pain.   Gastrointestinal:  Negative for abdominal pain, nausea and vomiting.   Genitourinary:  Negative for difficulty urinating and dysuria.        Historical Information   History reviewed. No pertinent past medical history.  Past Surgical History:   Procedure Laterality Date    COLONOSCOPY      CT CYSTOGRAM  02/02/2024    HARTMANS PROCEDURE N/A 01/09/2024    Procedure: EXPLORATORY LAPAROTOMY, SIGMOID COLECTOMY AND COLOSTOMY REPAIR OF BLADDER PERFORATION  HARTMANS PROCEDURE;  Surgeon: Robles Cheek MD;  Location:  MAIN OR;  Service: General    IR MIDLINE PLACEMENT  01/16/2024    KNEE SURGERY Right      Social History   Social History     Substance and Sexual Activity   Alcohol Use Yes    Alcohol/week: 7.0 standard drinks of alcohol    Types: 7 Cans of beer per week    Comment: daily     Social History     Substance and Sexual Activity   Drug Use Yes    Types: Cocaine    Comment: last used: 12/25/23     Social History     Tobacco Use   Smoking Status Every Day    Current packs/day: 0.50    Types: Cigarettes   Smokeless Tobacco Never     Family History: non-contributory    Meds/Allergies   PTA meds:   Prior to  "Admission Medications   Prescriptions Last Dose Informant Patient Reported? Taking?   amLODIPine (NORVASC) 10 mg tablet 8/22/2024  No Yes   Sig: Take 1 tablet (10 mg total) by mouth daily      Facility-Administered Medications: None     Allergies   Allergen Reactions    Penicillins Other (See Comments)     \"I don't know\"  Many years ago as a child       Objective   First Vitals:   Blood Pressure: 140/94 (08/22/24 0653)  Pulse: 79 (08/22/24 0653)  Temperature: 97.8 °F (36.6 °C) (08/22/24 0653)  Temp Source: Temporal (08/22/24 0653)  Respirations: 12 (08/22/24 0653)  Height: 5' 11\" (180.3 cm) (08/22/24 0653)  Weight - Scale: 76.7 kg (169 lb) (08/22/24 0653)  SpO2: 99 % (08/22/24 0653)    Current Vitals:   Blood Pressure: 140/94 (08/22/24 0653)  Pulse: 79 (08/22/24 0653)  Temperature: 97.8 °F (36.6 °C) (08/22/24 0653)  Temp Source: Temporal (08/22/24 0653)  Respirations: 12 (08/22/24 0653)  Height: 5' 11\" (180.3 cm) (08/22/24 0653)  Weight - Scale: 76.7 kg (169 lb) (08/22/24 0653)  SpO2: 99 % (08/22/24 0653)    No intake or output data in the 24 hours ending 08/22/24 0715    Invasive Devices       Peripheral Intravenous Line  Duration             Peripheral IV 08/22/24 Left;Ventral (anterior) Forearm <1 day              Drain  Duration             Colostomy  days    Closed/Suction Drain Lateral;Right Abdomen Bulb 15 Fr. 225 days    Closed/Suction Drain Right Abdomen Bulb 15 Fr. 225 days    Urethral Catheter Latex 16 Fr. 225 days    NG/OG/Enteral Tube Nasogastric 16 Fr Right nare 223 days                    Physical Exam  Vitals reviewed.   Constitutional:       General: He is not in acute distress.     Appearance: He is not toxic-appearing.   HENT:      Head: Normocephalic and atraumatic.   Eyes:      Extraocular Movements: Extraocular movements intact.   Pulmonary:      Effort: Pulmonary effort is normal. No respiratory distress.   Abdominal:      General: There is no distension.      Palpations: Abdomen is " soft.      Tenderness: There is no abdominal tenderness. There is no guarding.      Comments: Colostomy healthy pink, liquid stool in bag   Musculoskeletal:         General: Normal range of motion.      Cervical back: Normal range of motion.   Skin:     General: Skin is warm and dry.   Neurological:      Mental Status: He is alert and oriented to person, place, and time.   Psychiatric:         Mood and Affect: Mood normal.         Behavior: Behavior normal.         Thought Content: Thought content normal.          Lab Results: I have personally reviewed pertinent lab results.    Imaging: I have personally reviewed pertinent reports.    No results found.      Sandra Alexander PA-C   8/22/2024

## 2024-08-22 NOTE — PLAN OF CARE
Problem: GASTROINTESTINAL - ADULT  Goal: Minimal or absence of nausea and/or vomiting  Description: INTERVENTIONS:  - Administer IV fluids if ordered to ensure adequate hydration  - Maintain NPO status until nausea and vomiting are resolved  - Nasogastric tube if ordered  - Administer ordered antiemetic medications as needed  - Provide nonpharmacologic comfort measures as appropriate  - Advance diet as tolerated, if ordered  - Consider nutrition services referral to assist patient with adequate nutrition and appropriate food choices  Outcome: Progressing     Problem: GENITOURINARY - ADULT  Goal: Maintains or returns to baseline urinary function  Description: INTERVENTIONS:  - Assess urinary function  - Encourage oral fluids to ensure adequate hydration if ordered  - Administer IV fluids as ordered to ensure adequate hydration  - Administer ordered medications as needed  - Offer frequent toileting  - Follow urinary retention protocol if ordered  Outcome: Progressing     Problem: PAIN - ADULT  Goal: Verbalizes/displays adequate comfort level or baseline comfort level  Description: Interventions:  - Encourage patient to monitor pain and request assistance  - Assess pain using appropriate pain scale  - Administer analgesics based on type and severity of pain and evaluate response  - Implement non-pharmacological measures as appropriate and evaluate response  - Consider cultural and social influences on pain and pain management  - Notify physician/advanced practitioner if interventions unsuccessful or patient reports new pain  Outcome: Progressing

## 2024-08-22 NOTE — ANESTHESIA PROCEDURE NOTES
Peripheral Block    Patient location during procedure: OR  Start time: 8/22/2024 11:50 AM  Reason for block: at surgeon's request and post-op pain management  Staffing  Performed by: Yanet Pitts MD  Authorized by: Yanet Pitts MD    Preanesthetic Checklist  Completed: patient identified, IV checked, site marked, risks and benefits discussed, surgical consent, monitors and equipment checked, pre-op evaluation and timeout performed  Peripheral Block  Patient position: supine  Prep: ChloraPrep  Patient monitoring: frequent blood pressure checks, continuous pulse oximetry and heart rate  Block type: TAP  Laterality: bilateral  Injection technique: single-shot  Procedures: ultrasound guided, Ultrasound guidance required for the procedure to increase accuracy and safety of medication placement and decrease risk of complications.  Ultrasound permanent image saved  bupivacaine liposomal (EXPAREL) 1.3 % injection 20 mL - Perineural   20 mL - 8/22/2024 11:50:00 AM  bupivacaine (PF) (MARCAINE) 0.25 % injection 20 mL - Perineural   30 mL - 8/22/2024 11:50:00 AM  Needle  Needle type: Stimuplex   Needle gauge: 20 G  Needle length: 4 in  Needle localization: anatomical landmarks and ultrasound guidance  Assessment  Injection assessment: incremental injection, frequent aspiration, injected with ease, negative aspiration, negative for heart rate change, no paresthesia on injection, no symptoms of intraneural/intravenous injection and needle tip visualized at all times  Paresthesia pain: none  Post-procedure:  site cleaned  patient tolerated the procedure well with no immediate complications  Additional Notes  Bilateral rectus sheath and TAP blocks performed under ultrasound guidance. Total of 30 mL of 0.25% bupivacaine used along with 20 mL of Exparel divided equally.

## 2024-08-23 LAB
ALBUMIN SERPL BCG-MCNC: 3.7 G/DL (ref 3.5–5)
ALP SERPL-CCNC: 58 U/L (ref 34–104)
ALT SERPL W P-5'-P-CCNC: 11 U/L (ref 7–52)
ANION GAP SERPL CALCULATED.3IONS-SCNC: 9 MMOL/L (ref 4–13)
AST SERPL W P-5'-P-CCNC: 13 U/L (ref 13–39)
BASOPHILS # BLD AUTO: 0.03 THOUSANDS/ÂΜL (ref 0–0.1)
BASOPHILS NFR BLD AUTO: 0 % (ref 0–1)
BILIRUB SERPL-MCNC: 0.9 MG/DL (ref 0.2–1)
BUN SERPL-MCNC: 9 MG/DL (ref 5–25)
CALCIUM SERPL-MCNC: 8.7 MG/DL (ref 8.4–10.2)
CHLORIDE SERPL-SCNC: 103 MMOL/L (ref 96–108)
CO2 SERPL-SCNC: 27 MMOL/L (ref 21–32)
CREAT SERPL-MCNC: 0.97 MG/DL (ref 0.6–1.3)
EOSINOPHIL # BLD AUTO: 0.01 THOUSAND/ÂΜL (ref 0–0.61)
EOSINOPHIL NFR BLD AUTO: 0 % (ref 0–6)
ERYTHROCYTE [DISTWIDTH] IN BLOOD BY AUTOMATED COUNT: 15.3 % (ref 11.6–15.1)
GFR SERPL CREATININE-BSD FRML MDRD: 89 ML/MIN/1.73SQ M
GLUCOSE SERPL-MCNC: 95 MG/DL (ref 65–140)
HCT VFR BLD AUTO: 40.4 % (ref 36.5–49.3)
HGB BLD-MCNC: 13.2 G/DL (ref 12–17)
IMM GRANULOCYTES # BLD AUTO: 0.08 THOUSAND/UL (ref 0–0.2)
IMM GRANULOCYTES NFR BLD AUTO: 0 % (ref 0–2)
LYMPHOCYTES # BLD AUTO: 1.23 THOUSANDS/ÂΜL (ref 0.6–4.47)
LYMPHOCYTES NFR BLD AUTO: 7 % (ref 14–44)
MCH RBC QN AUTO: 28 PG (ref 26.8–34.3)
MCHC RBC AUTO-ENTMCNC: 32.7 G/DL (ref 31.4–37.4)
MCV RBC AUTO: 86 FL (ref 82–98)
MONOCYTES # BLD AUTO: 1.5 THOUSAND/ÂΜL (ref 0.17–1.22)
MONOCYTES NFR BLD AUTO: 8 % (ref 4–12)
NEUTROPHILS # BLD AUTO: 15.76 THOUSANDS/ÂΜL (ref 1.85–7.62)
NEUTS SEG NFR BLD AUTO: 85 % (ref 43–75)
NRBC BLD AUTO-RTO: 0 /100 WBCS
PLATELET # BLD AUTO: 214 THOUSANDS/UL (ref 149–390)
PMV BLD AUTO: 11.2 FL (ref 8.9–12.7)
POTASSIUM SERPL-SCNC: 3.6 MMOL/L (ref 3.5–5.3)
PROT SERPL-MCNC: 7 G/DL (ref 6.4–8.4)
RBC # BLD AUTO: 4.72 MILLION/UL (ref 3.88–5.62)
SODIUM SERPL-SCNC: 139 MMOL/L (ref 135–147)
WBC # BLD AUTO: 18.61 THOUSAND/UL (ref 4.31–10.16)

## 2024-08-23 PROCEDURE — 85025 COMPLETE CBC W/AUTO DIFF WBC: CPT | Performed by: PHYSICIAN ASSISTANT

## 2024-08-23 PROCEDURE — 80053 COMPREHEN METABOLIC PANEL: CPT | Performed by: PHYSICIAN ASSISTANT

## 2024-08-23 PROCEDURE — 99024 POSTOP FOLLOW-UP VISIT: CPT | Performed by: SURGERY

## 2024-08-23 RX ORDER — ACETAMINOPHEN 10 MG/ML
1000 INJECTION, SOLUTION INTRAVENOUS EVERY 6 HOURS PRN
Status: DISCONTINUED | OUTPATIENT
Start: 2024-08-23 | End: 2024-08-28

## 2024-08-23 RX ORDER — PANTOPRAZOLE SODIUM 40 MG/10ML
40 INJECTION, POWDER, LYOPHILIZED, FOR SOLUTION INTRAVENOUS EVERY 12 HOURS SCHEDULED
Status: DISCONTINUED | OUTPATIENT
Start: 2024-08-23 | End: 2024-08-28 | Stop reason: HOSPADM

## 2024-08-23 RX ORDER — HYDROMORPHONE HCL IN WATER/PF 6 MG/30 ML
0.2 PATIENT CONTROLLED ANALGESIA SYRINGE INTRAVENOUS
Status: DISCONTINUED | OUTPATIENT
Start: 2024-08-23 | End: 2024-08-28 | Stop reason: HOSPADM

## 2024-08-23 RX ORDER — MORPHINE SULFATE 4 MG/ML
4 INJECTION, SOLUTION INTRAMUSCULAR; INTRAVENOUS EVERY 4 HOURS PRN
Status: DISCONTINUED | OUTPATIENT
Start: 2024-08-23 | End: 2024-08-28

## 2024-08-23 RX ORDER — DIPHENHYDRAMINE HYDROCHLORIDE 50 MG/ML
25 INJECTION INTRAMUSCULAR; INTRAVENOUS EVERY 6 HOURS PRN
Status: DISCONTINUED | OUTPATIENT
Start: 2024-08-23 | End: 2024-08-28 | Stop reason: HOSPADM

## 2024-08-23 RX ADMIN — MORPHINE SULFATE 4 MG: 4 INJECTION INTRAVENOUS at 11:25

## 2024-08-23 RX ADMIN — HEPARIN SODIUM 5000 UNITS: 5000 INJECTION, SOLUTION INTRAVENOUS; SUBCUTANEOUS at 09:21

## 2024-08-23 RX ADMIN — HEPARIN SODIUM 5000 UNITS: 5000 INJECTION, SOLUTION INTRAVENOUS; SUBCUTANEOUS at 16:51

## 2024-08-23 RX ADMIN — METRONIDAZOLE 500 MG: 500 INJECTION, SOLUTION INTRAVENOUS at 09:15

## 2024-08-23 RX ADMIN — PANTOPRAZOLE SODIUM 40 MG: 40 INJECTION, POWDER, FOR SOLUTION INTRAVENOUS at 09:43

## 2024-08-23 RX ADMIN — MORPHINE SULFATE 2 MG: 2 INJECTION, SOLUTION INTRAMUSCULAR; INTRAVENOUS at 19:06

## 2024-08-23 RX ADMIN — CIPROFLOXACIN 400 MG: 400 INJECTION, SOLUTION INTRAVENOUS at 09:15

## 2024-08-23 RX ADMIN — PANTOPRAZOLE SODIUM 40 MG: 40 INJECTION, POWDER, FOR SOLUTION INTRAVENOUS at 20:05

## 2024-08-23 RX ADMIN — AMLODIPINE BESYLATE 10 MG: 10 TABLET ORAL at 09:21

## 2024-08-23 RX ADMIN — SODIUM CHLORIDE, SODIUM LACTATE, POTASSIUM CHLORIDE, AND CALCIUM CHLORIDE 125 ML/HR: .6; .31; .03; .02 INJECTION, SOLUTION INTRAVENOUS at 06:12

## 2024-08-23 NOTE — UTILIZATION REVIEW
NOTIFICATION OF INPATIENT ADMISSION   AUTHORIZATION REQUEST   SERVICING FACILITY:   [place] [address]  Tax ID: [id number]  NPI: [id number] ATTENDING PROVIDER:  Attending Name and NPI#: Robles Cheek Md [[id number]]  Address: [address] [place] [address]  Phone:  [phone]     ADMISSION INFORMATION:  Place of Service: Inpatient [facility]  Place of Service Code: 21  Inpatient Admission Date/Time: 8/22/24 12:10 PM  Discharge Date/Time: No discharge date for patient encounter.  Admitting Diagnosis Code/Description:  Colostomy status (HCC) [Z93.3]     UTILIZATION REVIEW CONTACT:  Petty Ba, Utilization   Network Utilization Review Department  Phone: [phone]  Fax: [phone]  Email: [email or web address]  Contact for approvals/pending authorizations, clinical reviews, and discharge.     PHYSICIAN ADVISORY SERVICES:  Medical Necessity Denial & Bxkc-ws-Fdhj Review  Phone: [phone]  Fax: [phone]  Email: [email or web address]     DISCHARGE SUPPORT TEAM:  For Patients Discharge Needs & Updates  Phone: [phone] opt. 2 Fax: [phone]  Email: [email or web address]

## 2024-08-23 NOTE — PLAN OF CARE
Problem: NEUROSENSORY - ADULT  Goal: Achieves stable or improved neurological status  Description: INTERVENTIONS  - Monitor and report changes in neurological status  - Monitor vital signs such as temperature, blood pressure, glucose, and any other labs ordered   - Initiate measures to prevent increased intracranial pressure  - Monitor for seizure activity and implement precautions if appropriate      Outcome: Progressing     Problem: CARDIOVASCULAR - ADULT  Goal: Maintains optimal cardiac output and hemodynamic stability  Description: INTERVENTIONS:  - Monitor I/O, vital signs and rhythm  - Monitor for S/S and trends of decreased cardiac output  - Administer and titrate ordered vasoactive medications to optimize hemodynamic stability  - Assess quality of pulses, skin color and temperature  - Assess for signs of decreased coronary artery perfusion  - Instruct patient to report change in severity of symptoms  Outcome: Progressing     Problem: RESPIRATORY - ADULT  Goal: Achieves optimal ventilation and oxygenation  Description: INTERVENTIONS:  - Assess for changes in respiratory status  - Assess for changes in mentation and behavior  - Position to facilitate oxygenation and minimize respiratory effort  - Oxygen administered by appropriate delivery if ordered  - Initiate smoking cessation education as indicated  - Encourage broncho-pulmonary hygiene including cough, deep breathe, Incentive Spirometry  - Assess the need for suctioning and aspirate as needed  - Assess and instruct to report SOB or any respiratory difficulty  - Respiratory Therapy support as indicated  Outcome: Progressing

## 2024-08-23 NOTE — PLAN OF CARE
Problem: NEUROSENSORY - ADULT  Goal: Achieves stable or improved neurological status  Description: INTERVENTIONS  - Monitor and report changes in neurological status  - Monitor vital signs such as temperature, blood pressure, glucose, and any other labs ordered   - Initiate measures to prevent increased intracranial pressure  - Monitor for seizure activity and implement precautions if appropriate      Outcome: Progressing     Problem: CARDIOVASCULAR - ADULT  Goal: Maintains optimal cardiac output and hemodynamic stability  Description: INTERVENTIONS:  - Monitor I/O, vital signs and rhythm  - Monitor for S/S and trends of decreased cardiac output  - Administer and titrate ordered vasoactive medications to optimize hemodynamic stability  - Assess quality of pulses, skin color and temperature  - Assess for signs of decreased coronary artery perfusion  - Instruct patient to report change in severity of symptoms  Outcome: Progressing     Problem: RESPIRATORY - ADULT  Goal: Achieves optimal ventilation and oxygenation  Description: INTERVENTIONS:  - Assess for changes in respiratory status  - Assess for changes in mentation and behavior  - Position to facilitate oxygenation and minimize respiratory effort  - Oxygen administered by appropriate delivery if ordered  - Initiate smoking cessation education as indicated  - Encourage broncho-pulmonary hygiene including cough, deep breathe, Incentive Spirometry  - Assess the need for suctioning and aspirate as needed  - Assess and instruct to report SOB or any respiratory difficulty  - Respiratory Therapy support as indicated  Outcome: Progressing     Problem: GASTROINTESTINAL - ADULT  Goal: Minimal or absence of nausea and/or vomiting  Description: INTERVENTIONS:  - Administer IV fluids if ordered to ensure adequate hydration  - Maintain NPO status until nausea and vomiting are resolved  - Nasogastric tube if ordered  - Administer ordered antiemetic medications as needed  -  Provide nonpharmacologic comfort measures as appropriate  - Advance diet as tolerated, if ordered  - Consider nutrition services referral to assist patient with adequate nutrition and appropriate food choices  Outcome: Progressing

## 2024-08-23 NOTE — PROGRESS NOTES
"Progress Note - General Surgery   Davis Goss 54 y.o. male MRN: [id number]  Unit/Bed#: -01 Encounter: [id number]    Assessment:  POD#1 s/p exploratory laparotomy, lysis of adhesions, colostomy reversal.  Original surgery Hartmanns procedure in January 2024 due to obstructing apple core lesion of sigmoid colon that was ultimately benign, likely diverticular stricture      Plan:  Continue NGT/NPO/IVF  24 hrs postop cipro/flagyl complete  Discontinue PCA at patient request  Discontinue Hannon catheter  Incentive spirometry   OOB as tolerated  ok to temporarily clamp NG for ambulation purposes   Continue to monitor for return of bowel function        Subjective/Objective     Subjective:  Patient states his Hannon catheter was leaking last night. He reports his pain is under control and he would like to get rid of the PCA.  Patient would like to get out of bed today    Objective:   AVSS  NGT 680cc  UOP 2.3  JOE drain 90cc    Blood pressure 136/93, pulse 88, temperature 98.6 °F (37 °C), temperature source Oral, resp. rate 16, height 5' 11\" (1.803 m), weight 76.6 kg (168 lb 14 oz), SpO2 93%.,Body mass index is 23.55 kg/m².      Intake/Output Summary (Last 24 hours) at 8/23/2024 0713  Last data filed at 8/23/2024 0612  Gross per 24 hour   Intake 5729.17 ml   Output 5270 ml   Net 459.17 ml       Invasive Devices       Peripheral Intravenous Line  Duration             Peripheral IV 08/22/24 Left;Ventral (anterior) Forearm 1 day    Peripheral IV 08/22/24 Right Hand <1 day              Drain  Duration             Colostomy  days    Closed/Suction Drain Lateral;Right Abdomen Bulb 15 Fr. 226 days    Closed/Suction Drain Right Abdomen Bulb 15 Fr. 226 days    Urethral Catheter Latex 16 Fr. 226 days    NG/OG/Enteral Tube Nasogastric Right nare 1 day    Closed/Suction Drain Anterior;Left Hip Bulb 15 Fr. <1 day    Urethral Catheter Latex 16 Fr. <1 day                    Physical Exam: /93 (BP Location: Right arm)   " "Pulse 88   Temp 98.6 °F (37 °C) (Oral)   Resp 16   Ht 5' 11\" (1.803 m)   Wt 76.6 kg (168 lb 14 oz)   SpO2 93%   BMI 23.55 kg/m²   General appearance: alert and oriented, in no acute distress  Head: Normocephalic, without obvious abnormality, atraumatic  Lungs: clear to auscultation bilaterally  Heart: regular rate and rhythm, S1, S2 normal, no murmur, click, rub or gallop  Abdomen:  soft, non distended, tender around incision, surgical wounds covered with gauze currently, no bowel sounds,   Extremities: extremities normal, warm and well-perfused; no cyanosis, clubbing, or edema  Skin: Skin color, texture, turgor normal. No rashes or lesions    Lab, Imaging and other studies:I have personally reviewed pertinent lab results.  , CBC:   Lab Results   Component Value Date    WBC 18.61 (H) 08/23/2024    HGB 13.2 08/23/2024    HCT 40.4 08/23/2024    MCV 86 08/23/2024     08/23/2024    RBC 4.72 08/23/2024    MCH 28.0 08/23/2024    MCHC 32.7 08/23/2024    RDW 15.3 (H) 08/23/2024    MPV 11.2 08/23/2024    NRBC 0 08/23/2024   , CMP:   Lab Results   Component Value Date    SODIUM 139 08/23/2024    K 3.6 08/23/2024     08/23/2024    CO2 27 08/23/2024    BUN 9 08/23/2024    CREATININE 0.97 08/23/2024    CALCIUM 8.7 08/23/2024    AST 13 08/23/2024    ALT 11 08/23/2024    ALKPHOS 58 08/23/2024    EGFR 89 08/23/2024   ,   VTE Pharmacologic Prophylaxis: Heparin SQ  VTE Mechanical Prophylaxis: sequential compression device  "

## 2024-08-23 NOTE — UTILIZATION REVIEW
Initial Clinical Review    Elective Inpatient surgical procedure  Age/Sex: 54 y.o. male  Surgery Date: 8/22/24  Procedure: EXPLORATORY LAPAROTOMY  LYSIS ADHESIONS  REVERSAL OF COLOSTOMY  Anesthesia: General   Operative Findings: Extensive adhesions between the small bowel and the anterior abdominal wall. Multiple interloop adhesions and bands present between the small bowel. Healthy rectal stump. Healthy colostomy.     8/23/24 POD#1 Progress Note: Pt reports his pain is under control and he would like to get rid of the PCA. Urinating adequate amount, Benson was removed and voided after benson removed. On exam, abd non-distended, tender around incision, surgical wounds covered w/ gauze. No bowel sounds. Plan: Continue NPO, Maintain NGT until starts passing flatus, D/C PCA, Continue IV fluids. Discontinue IV abx 24hrs post op, OOB, DVT prophylaxis.     Admission Orders: Date/Time/Statement:   Admission Orders (From admission, onward)       Ordered        08/22/24 1210  Inpatient Admission  Once                          Orders Placed This Encounter   Procedures    Inpatient Admission     Standing Status:   Standing     Number of Occurrences:   1     Order Specific Question:   Level of Care     Answer:   Med Surg [16]     Order Specific Question:   Estimated length of stay     Answer:   More than 2 Midnights     Order Specific Question:   Certification     Answer:   I certify that inpatient services are medically necessary for this patient for a duration of greater than two midnights. See H&P and MD Progress Notes for additional information about the patient's course of treatment.       Vital Signs (last 3 days)       Date/Time Temp Pulse Resp BP MAP (mmHg) SpO2 O2 Flow Rate (L/min) O2 Device Cardiac (WDL) Patient Position - Orthostatic VS Pain    08/23/24 0259 98.6 °F (37 °C) 88 16 136/93 -- 93 % -- None (Room air) -- Lying No Pain    08/22/24 2310 98.4 °F (36.9 °C) 95 16 135/96 110 94 % -- None (Room air) -- Lying No  Pain    08/22/24 2213 -- 92 16 -- -- -- -- -- -- -- No Pain    08/22/24 2140 98.4 °F (36.9 °C) -- -- -- -- -- -- -- -- -- --    08/22/24 2006 99.4 °F (37.4 °C) 87 20 138/89 112 94 % -- None (Room air) -- Lying 4    08/22/24 1914 -- -- -- -- -- -- -- -- -- -- 8    08/22/24 1911 99.2 °F (37.3 °C) 85 20 142/70 95 -- -- -- -- Lying 8 08/22/24 1907 -- -- -- -- -- -- -- -- -- -- 8    08/22/24 1800 -- 84 18 155/69 -- -- -- -- -- Lying --    08/22/24 1700 -- 80 17 138/96 -- -- -- -- -- Lying --    08/22/24 1630 -- 98 17 126/96 -- -- -- -- -- Lying --    08/22/24 1600 -- 77 15 122/86 -- -- -- -- -- Lying --    08/22/24 1545 98 °F (36.7 °C) 75 15 120/86 -- -- -- -- -- Lying --    08/22/24 1542 -- -- -- -- -- 96 % -- None (Room air) -- -- --    08/22/24 1530 -- 72 15 123/84 -- 96 % -- None (Room air) -- Lying 10 - Worst Possible Pain    08/22/24 1513 -- 73 15 120/84 -- 96 % -- -- -- -- 10 - Worst Possible Pain    08/22/24 1430 98.3 °F (36.8 °C) 72 18 115/78 -- 97 % -- None (Room air) X -- 4    08/22/24 1402 -- -- -- -- -- -- -- -- -- -- 10 - Worst Possible Pain    08/22/24 1400 98 °F (36.7 °C) 74 12 112/71 -- 97 % -- None (Room air) X -- --    08/22/24 1330 97.9 °F (36.6 °C) 75 12 106/71 -- 96 % -- None (Room air) X -- 10 - Worst Possible Pain    08/22/24 1328 -- -- -- -- -- -- -- -- -- -- 10 - Worst Possible Pain    08/22/24 1300 97.9 °F (36.6 °C) 73 14 104/66 -- 96 % -- None (Room air) X -- No Pain    08/22/24 1245 98.4 °F (36.9 °C) 74 16 94/56 70 97 % -- None (Room air) X -- No Pain    08/22/24 1230 98 °F (36.7 °C) 78 14 91/58 -- 96 % -- None (Room air) X -- No Pain    08/22/24 1215 99 °F (37.2 °C) 76 12 91/58 -- 99 % 3 L/min Simple mask X -- --    08/22/24 1200 98.7 °F (37.1 °C) 78 12 99/60 -- 100 % 6 L/min Simple mask X -- --    08/22/24 0653 97.8 °F (36.6 °C) 79 12 140/94 -- 99 % -- None (Room air) -- -- No Pain    08/22/24 0650 -- -- -- -- -- -- -- -- -- -- No Pain          Weight (last 2 days)       Date/Time Weight     08/22/24 1530 76.6 (168.87)    08/22/24 0653 76.7 (169)            Pertinent Labs/Diagnostic Test Results:         Results from last 7 days   Lab Units 08/23/24  0516   WBC Thousand/uL 18.61*   HEMOGLOBIN g/dL 13.2   HEMATOCRIT % 40.4   PLATELETS Thousands/uL 214   TOTAL NEUT ABS Thousands/µL 15.76*         Results from last 7 days   Lab Units 08/23/24  0516   SODIUM mmol/L 139   POTASSIUM mmol/L 3.6   CHLORIDE mmol/L 103   CO2 mmol/L 27   ANION GAP mmol/L 9   BUN mg/dL 9   CREATININE mg/dL 0.97   EGFR ml/min/1.73sq m 89   CALCIUM mg/dL 8.7     Results from last 7 days   Lab Units 08/23/24  0516   AST U/L 13   ALT U/L 11   ALK PHOS U/L 58   TOTAL PROTEIN g/dL 7.0   ALBUMIN g/dL 3.7   TOTAL BILIRUBIN mg/dL 0.90         Results from last 7 days   Lab Units 08/23/24  0516   GLUCOSE RANDOM mg/dL 95         Results from last 7 days   Lab Units 08/22/24  0655   PROTIME seconds 15.2*   INR  1.16   PTT seconds 30       Diet: NPO  Mobility: OOB  DVT Prophylaxis: SCD    Medications/Pain Control:   Scheduled Medications:  amLODIPine, 10 mg, Oral, Daily  ciprofloxacin, 400 mg, Intravenous, Q12H  heparin (porcine), 5,000 Units, Subcutaneous, Q8H PENELOPE  metroNIDAZOLE, 500 mg, Intravenous, Q8H  nicotine, 7 mg, Transdermal, Daily  pantoprazole, 40 mg, Intravenous, Q12H PENELOPE      Continuous IV Infusions:  lactated ringers, 125 mL/hr, Intravenous, Continuous  HYDROmorphone (DILAUDID) 1 mg/mL 50 mL PCA  Continuous Rate: 0 mg/hr  PCA Dose: 0.2 mg  PCA Lock-out: 10 Minutes  One Hour Limit: 1.2 mg  Freq: Continuous Route: IV  Last Dose: Stopped (08/23/24 0933)  Start: 08/22/24 1230 End: 08/23/24 0810    PRN Meds:  acetaminophen, 1,000 mg, Intravenous, Q6H PRN  naloxone, 0.1 mg, Intravenous, Q3 min PRN  ondansetron, 4 mg, Intravenous, Q6H PRN    morphine injection 4 mg  Dose: 4 mg  Freq: Every 4 hours PRN Route: IV  PRN Reason: severe pain  Start: 08/23/24 0810    Network Utilization Review Department  ATTENTION: Please call with any  questions or concerns to [phone] and carefully listen to the prompts so that you are directed to the right person. All voicemails are confidential.   For Discharge needs, contact Care Management DC Support Team at [phone] opt. 2  Send all requests for admission clinical reviews, approved or denied determinations and any other requests to dedicated fax number below belonging to the [place] [facility] where the patient is receiving treatment. List of dedicated fax numbers for the Facilities:  FACILITY NAME UR FAX NUMBER   ADMISSION DENIALS (Administrative/Medical Necessity) [phone]   DISCHARGE SUPPORT TEAM (NETWORK) [phone]   PARENT CHILD HEALTH (Maternity/NICU/Pediatrics) [phone]   [facility] [phone]   [facility] [phone]   [facility] [phone]   [facility] [phone]   [facility] [phone]   [facility] [phone]   [facility] [phone]   [facility] [phone]   [facility] [phone]   [facility] [phone]   [facility] [phone]   [facility] [phone]

## 2024-08-24 LAB
ANION GAP SERPL CALCULATED.3IONS-SCNC: 12 MMOL/L (ref 4–13)
BASOPHILS # BLD AUTO: 0.05 THOUSANDS/ÂΜL (ref 0–0.1)
BASOPHILS NFR BLD AUTO: 0 % (ref 0–1)
BUN SERPL-MCNC: 11 MG/DL (ref 5–25)
CALCIUM SERPL-MCNC: 9.2 MG/DL (ref 8.4–10.2)
CHLORIDE SERPL-SCNC: 99 MMOL/L (ref 96–108)
CO2 SERPL-SCNC: 26 MMOL/L (ref 21–32)
CREAT SERPL-MCNC: 0.93 MG/DL (ref 0.6–1.3)
EOSINOPHIL # BLD AUTO: 0.05 THOUSAND/ÂΜL (ref 0–0.61)
EOSINOPHIL NFR BLD AUTO: 0 % (ref 0–6)
ERYTHROCYTE [DISTWIDTH] IN BLOOD BY AUTOMATED COUNT: 15.2 % (ref 11.6–15.1)
GFR SERPL CREATININE-BSD FRML MDRD: 94 ML/MIN/1.73SQ M
GLUCOSE SERPL-MCNC: 78 MG/DL (ref 65–140)
HCT VFR BLD AUTO: 41.1 % (ref 36.5–49.3)
HGB BLD-MCNC: 13.8 G/DL (ref 12–17)
IMM GRANULOCYTES # BLD AUTO: 0.04 THOUSAND/UL (ref 0–0.2)
IMM GRANULOCYTES NFR BLD AUTO: 0 % (ref 0–2)
LYMPHOCYTES # BLD AUTO: 1.36 THOUSANDS/ÂΜL (ref 0.6–4.47)
LYMPHOCYTES NFR BLD AUTO: 10 % (ref 14–44)
MAGNESIUM SERPL-MCNC: 1.6 MG/DL (ref 1.9–2.7)
MCH RBC QN AUTO: 28.7 PG (ref 26.8–34.3)
MCHC RBC AUTO-ENTMCNC: 33.6 G/DL (ref 31.4–37.4)
MCV RBC AUTO: 85 FL (ref 82–98)
MONOCYTES # BLD AUTO: 1.16 THOUSAND/ÂΜL (ref 0.17–1.22)
MONOCYTES NFR BLD AUTO: 8 % (ref 4–12)
NEUTROPHILS # BLD AUTO: 11.08 THOUSANDS/ÂΜL (ref 1.85–7.62)
NEUTS SEG NFR BLD AUTO: 82 % (ref 43–75)
NRBC BLD AUTO-RTO: 0 /100 WBCS
PHOSPHATE SERPL-MCNC: 2.7 MG/DL (ref 2.7–4.5)
PLATELET # BLD AUTO: 191 THOUSANDS/UL (ref 149–390)
PMV BLD AUTO: 11.1 FL (ref 8.9–12.7)
POTASSIUM SERPL-SCNC: 3.2 MMOL/L (ref 3.5–5.3)
RBC # BLD AUTO: 4.81 MILLION/UL (ref 3.88–5.62)
SODIUM SERPL-SCNC: 137 MMOL/L (ref 135–147)
WBC # BLD AUTO: 13.74 THOUSAND/UL (ref 4.31–10.16)

## 2024-08-24 PROCEDURE — 85025 COMPLETE CBC W/AUTO DIFF WBC: CPT | Performed by: PHYSICIAN ASSISTANT

## 2024-08-24 PROCEDURE — 84100 ASSAY OF PHOSPHORUS: CPT | Performed by: PHYSICIAN ASSISTANT

## 2024-08-24 PROCEDURE — 83735 ASSAY OF MAGNESIUM: CPT | Performed by: PHYSICIAN ASSISTANT

## 2024-08-24 PROCEDURE — 80048 BASIC METABOLIC PNL TOTAL CA: CPT | Performed by: PHYSICIAN ASSISTANT

## 2024-08-24 PROCEDURE — 99024 POSTOP FOLLOW-UP VISIT: CPT | Performed by: SPECIALIST

## 2024-08-24 RX ORDER — MAGNESIUM SULFATE HEPTAHYDRATE 40 MG/ML
2 INJECTION, SOLUTION INTRAVENOUS ONCE
Status: COMPLETED | OUTPATIENT
Start: 2024-08-24 | End: 2024-08-24

## 2024-08-24 RX ADMIN — MAGNESIUM SULFATE HEPTAHYDRATE 2 G: 40 INJECTION, SOLUTION INTRAVENOUS at 09:42

## 2024-08-24 RX ADMIN — HEPARIN SODIUM 5000 UNITS: 5000 INJECTION, SOLUTION INTRAVENOUS; SUBCUTANEOUS at 08:37

## 2024-08-24 RX ADMIN — MORPHINE SULFATE 2 MG: 2 INJECTION, SOLUTION INTRAMUSCULAR; INTRAVENOUS at 18:37

## 2024-08-24 RX ADMIN — SODIUM CHLORIDE, SODIUM LACTATE, POTASSIUM CHLORIDE, AND CALCIUM CHLORIDE 125 ML/HR: .6; .31; .03; .02 INJECTION, SOLUTION INTRAVENOUS at 20:08

## 2024-08-24 RX ADMIN — SODIUM CHLORIDE, SODIUM LACTATE, POTASSIUM CHLORIDE, AND CALCIUM CHLORIDE 125 ML/HR: .6; .31; .03; .02 INJECTION, SOLUTION INTRAVENOUS at 11:44

## 2024-08-24 RX ADMIN — SODIUM CHLORIDE, SODIUM LACTATE, POTASSIUM CHLORIDE, AND CALCIUM CHLORIDE 125 ML/HR: .6; .31; .03; .02 INJECTION, SOLUTION INTRAVENOUS at 00:23

## 2024-08-24 RX ADMIN — PANTOPRAZOLE SODIUM 40 MG: 40 INJECTION, POWDER, FOR SOLUTION INTRAVENOUS at 20:09

## 2024-08-24 RX ADMIN — HEPARIN SODIUM 5000 UNITS: 5000 INJECTION, SOLUTION INTRAVENOUS; SUBCUTANEOUS at 00:21

## 2024-08-24 RX ADMIN — MORPHINE SULFATE 2 MG: 2 INJECTION, SOLUTION INTRAMUSCULAR; INTRAVENOUS at 00:23

## 2024-08-24 RX ADMIN — AMLODIPINE BESYLATE 10 MG: 10 TABLET ORAL at 08:37

## 2024-08-24 RX ADMIN — POTASSIUM PHOSPHATE, MONOBASIC AND POTASSIUM PHOSPHATE, DIBASIC 12 MMOL: 224; 236 INJECTION, SOLUTION, CONCENTRATE INTRAVENOUS at 11:44

## 2024-08-24 RX ADMIN — PANTOPRAZOLE SODIUM 40 MG: 40 INJECTION, POWDER, FOR SOLUTION INTRAVENOUS at 09:52

## 2024-08-24 RX ADMIN — HEPARIN SODIUM 5000 UNITS: 5000 INJECTION, SOLUTION INTRAVENOUS; SUBCUTANEOUS at 16:44

## 2024-08-24 RX ADMIN — MORPHINE SULFATE 2 MG: 2 INJECTION, SOLUTION INTRAMUSCULAR; INTRAVENOUS at 14:10

## 2024-08-24 NOTE — PROGRESS NOTES
"Progress Note - General Surgery   Patient: Davis Goss   : 1970 Sex: male MRN: [id number]   CSN: [id number] PCP: WES Santillan  Unit/Bed#: -01     SUBJECTIVE:   I am doing better pain is adequately controlled  No flatus no bowel movement    OBJECTIVE  Appears comfortable in the bed  Good urine output NG tube 750 cc  JOE serosanguineous  Hemodynamically stable    Vitals:   I/O last 24 hours:  In: 8.2 [I.V.:8.2]  Out: 2765 [Urine:2000; Emesis/NG output:750; Drains:15]Blood pressure 149/99, pulse 91, temperature 99.6 °F (37.6 °C), temperature source Tympanic, resp. rate 17, height 5' 11\" (1.803 m), weight 76.6 kg (168 lb 14 oz), SpO2 98%.,Body mass index is 23.55 kg/m².  Invasive Devices       Peripheral Intravenous Line  Duration             Peripheral IV 24 Left;Ventral (anterior) Forearm 2 days    Peripheral IV 24 Right Hand 2 days              Drain  Duration             NG/OG/Enteral Tube Nasogastric Right nare 2 days    Closed/Suction Drain Anterior;Left Hip Bulb 15 Fr. 1 day                    Active medications:    Current Facility-Administered Medications:     acetaminophen (Ofirmev) injection 1,000 mg, 1,000 mg, Intravenous, Q6H PRN    amLODIPine (NORVASC) tablet 10 mg, 10 mg, Oral, Daily, 10 mg at 24 0837    diphenhydrAMINE (BENADRYL) injection 25 mg, 25 mg, Intravenous, Q6H PRN    heparin (porcine) subcutaneous injection 5,000 Units, 5,000 Units, Subcutaneous, Q8H PENELOPE, 5,000 Units at 24 0837    HYDROmorphone HCl (DILAUDID) injection 0.2 mg, 0.2 mg, Intravenous, Q3H PRN    lactated ringers infusion, 125 mL/hr, Intravenous, Continuous, 125 mL/hr at 24 0023    magnesium sulfate 2 g/50 mL IVPB (premix) 2 g, 2 g, Intravenous, Once    morphine injection 2 mg, 2 mg, Intravenous, Q4H PRN, 2 mg at 24 0023    morphine injection 4 mg, 4 mg, Intravenous, Q4H PRN, 4 mg at 24 1125    naloxone (NARCAN) injection 0.1 mg, 0.1 mg, Intravenous, Q3 min " PRN    nicotine (NICODERM CQ) 7 mg/24hr TD 24 hr patch 7 mg, 7 mg, Transdermal, Daily    ondansetron (ZOFRAN) injection 4 mg, 4 mg, Intravenous, Q6H PRN    pantoprazole (PROTONIX) injection 40 mg, 40 mg, Intravenous, Q12H PENELOPE, 40 mg at 08/23/24 2005    potassium phosphates 12 mmol in sodium chloride 0.9 % 250 mL infusion, 12 mmol, Intravenous, Once    Physical Exam:   General Alert awake   Normocephalic atraumatic PERRLA  Lungs clear bilaterally  Cardiac normal S1 normal S2  Abdomen soft,non tender Bowel sounds absent  Skin: surgical dressing is C/D/I  Ext: No clubbing, cyanosis, edema    Lab, Imaging and other studies:  Recent Results (from the past 24 hour(s))   CBC and differential    Collection Time: 08/24/24  5:06 AM   Result Value Ref Range    WBC 13.74 (H) 4.31 - 10.16 Thousand/uL    RBC 4.81 3.88 - 5.62 Million/uL    Hemoglobin 13.8 12.0 - 17.0 g/dL    Hematocrit 41.1 36.5 - 49.3 %    MCV 85 82 - 98 fL    MCH 28.7 26.8 - 34.3 pg    MCHC 33.6 31.4 - 37.4 g/dL    RDW 15.2 (H) 11.6 - 15.1 %    MPV 11.1 8.9 - 12.7 fL    Platelets 191 149 - 390 Thousands/uL    nRBC 0 /100 WBCs    Segmented % 82 (H) 43 - 75 %    Immature Grans % 0 0 - 2 %    Lymphocytes % 10 (L) 14 - 44 %    Monocytes % 8 4 - 12 %    Eosinophils Relative 0 0 - 6 %    Basophils Relative 0 0 - 1 %    Absolute Neutrophils 11.08 (H) 1.85 - 7.62 Thousands/µL    Absolute Immature Grans 0.04 0.00 - 0.20 Thousand/uL    Absolute Lymphocytes 1.36 0.60 - 4.47 Thousands/µL    Absolute Monocytes 1.16 0.17 - 1.22 Thousand/µL    Eosinophils Absolute 0.05 0.00 - 0.61 Thousand/µL    Basophils Absolute 0.05 0.00 - 0.10 Thousands/µL   Basic metabolic panel    Collection Time: 08/24/24  5:06 AM   Result Value Ref Range    Sodium 137 135 - 147 mmol/L    Potassium 3.2 (L) 3.5 - 5.3 mmol/L    Chloride 99 96 - 108 mmol/L    CO2 26 21 - 32 mmol/L    ANION GAP 12 4 - 13 mmol/L    BUN 11 5 - 25 mg/dL    Creatinine 0.93 0.60 - 1.30 mg/dL    Glucose 78 65 - 140 mg/dL     Calcium 9.2 8.4 - 10.2 mg/dL    eGFR 94 ml/min/1.73sq m   Magnesium    Collection Time: 08/24/24  5:06 AM   Result Value Ref Range    Magnesium 1.6 (L) 1.9 - 2.7 mg/dL   Phosphorus    Collection Time: 08/24/24  5:06 AM   Result Value Ref Range    Phosphorus 2.7 2.7 - 4.5 mg/dL     VTE Pharmacologic Prophylaxis: Heparin  VTE Mechanical Prophylaxis: sequential compression device       Diet Orders   (From admission, onward)                 Start     Ordered    08/22/24 1542  Diet NPO; Sips with meds  Diet effective now        Comments: Clamp NGT for 1 hour when administering PO meds   References:    Adult Nutrition Support Algorithm    RD Therapeutic Diet Order Protocol   Question Answer Comment   Diet Type NPO    NPO Except: Sips with meds    RD to adjust diet per protocol? No        08/22/24 1541                .  Admitting Diagnosis: Colostomy status (HCC) [Z93.3]  Code Status: Level 1 - Full Code  Patient Active Problem List   Diagnosis    Large bowel obstruction (HCC)    Smoking    Substance use    Elevated blood pressure reading    Colonic obstruction (HCC)    Severe protein-calorie malnutrition (HCC)    Hypokalemia    Pancolitis (HCC)    Annual physical exam    Tachycardia    Anxiety about health    Colostomy status (HCC)     PT/OT/ST reviewed.  Plan was coordinated with Nursing staff & Case management.  Plan of care was discussed with patient    Assessment/ Plan:  Davis Goss is a 54 y.o. male 2 day(s) POD # second s/p reversal of colostomy/Joe's procedure reversal    Pain control  Pulmonary hygiene  DVT prophylaxis  OOB/Ambulation  Labs in the morning  Continue n.p.o.  Place electrolyte low mag and potassium    Counseling / Coordination of Care  Total floor / unit time spent today 30minutes.  Greater than 50% of total time was spent with the patient and / or family counseling and / or coordination of care.     Steve Klein MD MS FRCS FACS  [facility]  08/24/24 9:38  "[facility]:  [address], [address] [place] [address]  Office  [phone] Fax [phone]    Portions of the record may have been created with voice recognition software. Occasional wrong word or \"sound a like\" substitutions may have occurred due to the inherent limitations of voice recognition software. Read the chart carefully and recognize, using context, where substitutions have occurred.        "

## 2024-08-25 LAB
ANION GAP SERPL CALCULATED.3IONS-SCNC: 15 MMOL/L (ref 4–13)
BASOPHILS # BLD AUTO: 0.05 THOUSANDS/ÂΜL (ref 0–0.1)
BASOPHILS NFR BLD AUTO: 0 % (ref 0–1)
BUN SERPL-MCNC: 15 MG/DL (ref 5–25)
CALCIUM SERPL-MCNC: 9 MG/DL (ref 8.4–10.2)
CHLORIDE SERPL-SCNC: 101 MMOL/L (ref 96–108)
CO2 SERPL-SCNC: 22 MMOL/L (ref 21–32)
CREAT SERPL-MCNC: 0.86 MG/DL (ref 0.6–1.3)
EOSINOPHIL # BLD AUTO: 0.06 THOUSAND/ÂΜL (ref 0–0.61)
EOSINOPHIL NFR BLD AUTO: 0 % (ref 0–6)
ERYTHROCYTE [DISTWIDTH] IN BLOOD BY AUTOMATED COUNT: 15.2 % (ref 11.6–15.1)
GFR SERPL CREATININE-BSD FRML MDRD: 99 ML/MIN/1.73SQ M
GLUCOSE SERPL-MCNC: 96 MG/DL (ref 65–140)
HCT VFR BLD AUTO: 45.1 % (ref 36.5–49.3)
HGB BLD-MCNC: 15.1 G/DL (ref 12–17)
IMM GRANULOCYTES # BLD AUTO: 0.08 THOUSAND/UL (ref 0–0.2)
IMM GRANULOCYTES NFR BLD AUTO: 1 % (ref 0–2)
LYMPHOCYTES # BLD AUTO: 0.81 THOUSANDS/ÂΜL (ref 0.6–4.47)
LYMPHOCYTES NFR BLD AUTO: 6 % (ref 14–44)
MAGNESIUM SERPL-MCNC: 2 MG/DL (ref 1.9–2.7)
MCH RBC QN AUTO: 28.5 PG (ref 26.8–34.3)
MCHC RBC AUTO-ENTMCNC: 33.5 G/DL (ref 31.4–37.4)
MCV RBC AUTO: 85 FL (ref 82–98)
MONOCYTES # BLD AUTO: 1.03 THOUSAND/ÂΜL (ref 0.17–1.22)
MONOCYTES NFR BLD AUTO: 7 % (ref 4–12)
NEUTROPHILS # BLD AUTO: 12.2 THOUSANDS/ÂΜL (ref 1.85–7.62)
NEUTS SEG NFR BLD AUTO: 86 % (ref 43–75)
NRBC BLD AUTO-RTO: 0 /100 WBCS
PHOSPHATE SERPL-MCNC: 4 MG/DL (ref 2.7–4.5)
PLATELET # BLD AUTO: 203 THOUSANDS/UL (ref 149–390)
PMV BLD AUTO: 12 FL (ref 8.9–12.7)
POTASSIUM SERPL-SCNC: 3.6 MMOL/L (ref 3.5–5.3)
RBC # BLD AUTO: 5.29 MILLION/UL (ref 3.88–5.62)
SODIUM SERPL-SCNC: 138 MMOL/L (ref 135–147)
WBC # BLD AUTO: 14.23 THOUSAND/UL (ref 4.31–10.16)

## 2024-08-25 PROCEDURE — 83735 ASSAY OF MAGNESIUM: CPT | Performed by: SPECIALIST

## 2024-08-25 PROCEDURE — 84100 ASSAY OF PHOSPHORUS: CPT | Performed by: SPECIALIST

## 2024-08-25 PROCEDURE — 99024 POSTOP FOLLOW-UP VISIT: CPT | Performed by: SPECIALIST

## 2024-08-25 PROCEDURE — 85025 COMPLETE CBC W/AUTO DIFF WBC: CPT | Performed by: SPECIALIST

## 2024-08-25 PROCEDURE — 80048 BASIC METABOLIC PNL TOTAL CA: CPT | Performed by: SPECIALIST

## 2024-08-25 RX ADMIN — SODIUM CHLORIDE, SODIUM LACTATE, POTASSIUM CHLORIDE, AND CALCIUM CHLORIDE 125 ML/HR: .6; .31; .03; .02 INJECTION, SOLUTION INTRAVENOUS at 22:41

## 2024-08-25 RX ADMIN — DIPHENHYDRAMINE HYDROCHLORIDE 25 MG: 50 INJECTION, SOLUTION INTRAMUSCULAR; INTRAVENOUS at 15:59

## 2024-08-25 RX ADMIN — DIPHENHYDRAMINE HYDROCHLORIDE 25 MG: 50 INJECTION, SOLUTION INTRAMUSCULAR; INTRAVENOUS at 00:31

## 2024-08-25 RX ADMIN — AMLODIPINE BESYLATE 10 MG: 10 TABLET ORAL at 09:42

## 2024-08-25 RX ADMIN — PANTOPRAZOLE SODIUM 40 MG: 40 INJECTION, POWDER, FOR SOLUTION INTRAVENOUS at 09:38

## 2024-08-25 RX ADMIN — PANTOPRAZOLE SODIUM 40 MG: 40 INJECTION, POWDER, FOR SOLUTION INTRAVENOUS at 20:01

## 2024-08-25 RX ADMIN — MORPHINE SULFATE 2 MG: 2 INJECTION, SOLUTION INTRAMUSCULAR; INTRAVENOUS at 20:00

## 2024-08-25 RX ADMIN — HEPARIN SODIUM 5000 UNITS: 5000 INJECTION, SOLUTION INTRAVENOUS; SUBCUTANEOUS at 16:04

## 2024-08-25 RX ADMIN — HEPARIN SODIUM 5000 UNITS: 5000 INJECTION, SOLUTION INTRAVENOUS; SUBCUTANEOUS at 09:38

## 2024-08-25 RX ADMIN — SODIUM CHLORIDE, SODIUM LACTATE, POTASSIUM CHLORIDE, AND CALCIUM CHLORIDE 125 ML/HR: .6; .31; .03; .02 INJECTION, SOLUTION INTRAVENOUS at 05:06

## 2024-08-25 RX ADMIN — ONDANSETRON 4 MG: 2 INJECTION INTRAMUSCULAR; INTRAVENOUS at 00:19

## 2024-08-25 RX ADMIN — HEPARIN SODIUM 5000 UNITS: 5000 INJECTION, SOLUTION INTRAVENOUS; SUBCUTANEOUS at 00:19

## 2024-08-25 RX ADMIN — NICOTINE 7 MG: 7 PATCH, EXTENDED RELEASE TRANSDERMAL at 09:38

## 2024-08-25 NOTE — PLAN OF CARE
Problem: GASTROINTESTINAL - ADULT  Goal: Minimal or absence of nausea and/or vomiting  Description: INTERVENTIONS:  - Administer IV fluids if ordered to ensure adequate hydration  - Maintain NPO status until nausea and vomiting are resolved  - Nasogastric tube if ordered  - Administer ordered antiemetic medications as needed  - Provide nonpharmacologic comfort measures as appropriate  - Advance diet as tolerated, if ordered  - Consider nutrition services referral to assist patient with adequate nutrition and appropriate food choices  Outcome: Progressing  Goal: Maintains or returns to baseline bowel function  Description: INTERVENTIONS:  - Assess bowel function  - Encourage oral fluids to ensure adequate hydration  - Administer IV fluids if ordered to ensure adequate hydration  - Administer ordered medications as needed  - Encourage mobilization and activity  - Consider nutritional services referral to assist patient with adequate nutrition and appropriate food choices  Outcome: Progressing  Goal: Maintains adequate nutritional intake  Description: INTERVENTIONS:  - Monitor percentage of each meal consumed  - Identify factors contributing to decreased intake, treat as appropriate  - Assist with meals as needed  - Monitor I&O, weight, and lab values if indicated  - Obtain nutrition services referral as needed  Outcome: Progressing

## 2024-08-25 NOTE — PROGRESS NOTES
"Progress Note - General Surgery   Patient: Davis Goss   : 1970 Sex: male MRN: [id number]   CSN: [id number] PCP: WES Santillan  Unit/Bed#: -01     SUBJECTIVE:   I am feeling hungry and thirsty can I start eating  Flatus no bowel movement  OBJECTIVE  Hemodynamically stable overnight  Good urine output NG tube minimal 450 cc  JOE drain serosanguineous midline incision and colostomy site is dry clean intact    Vitals:   I/O last 24 hours:  In: -   Out: 1535 [Urine:1050; Emesis/NG output:450; Drains:35]Blood pressure 146/100, pulse 87, temperature 97.8 °F (36.6 °C), temperature source Oral, resp. rate (!) 25, height 5' 11\" (1.803 m), weight 76.6 kg (168 lb 14 oz), SpO2 95%.,Body mass index is 23.55 kg/m².    Invasive Devices       Peripheral Intravenous Line  Duration             Peripheral IV 24 Left;Ventral (anterior) Forearm 3 days    Peripheral IV 24 Right Hand 3 days              Drain  Duration             Closed/Suction Drain Anterior;Left Hip Bulb 15 Fr. 3 days    NG/OG/Enteral Tube Nasogastric Right nare 3 days                  Active medications:    Current Facility-Administered Medications:     acetaminophen (Ofirmev) injection 1,000 mg, 1,000 mg, Intravenous, Q6H PRN    amLODIPine (NORVASC) tablet 10 mg, 10 mg, Oral, Daily, 10 mg at 24 0942    diphenhydrAMINE (BENADRYL) injection 25 mg, 25 mg, Intravenous, Q6H PRN, 25 mg at 24 0031    heparin (porcine) subcutaneous injection 5,000 Units, 5,000 Units, Subcutaneous, Q8H PENELOPE, 5,000 Units at 24 0938    HYDROmorphone HCl (DILAUDID) injection 0.2 mg, 0.2 mg, Intravenous, Q3H PRN    lactated ringers infusion, 125 mL/hr, Intravenous, Continuous, 125 mL/hr at 24 0506    morphine injection 2 mg, 2 mg, Intravenous, Q4H PRN, 2 mg at 24 1837    morphine injection 4 mg, 4 mg, Intravenous, Q4H PRN, 4 mg at 24 1125    naloxone (NARCAN) injection 0.1 mg, 0.1 mg, Intravenous, Q3 min PRN    nicotine " (NICODERM CQ) 7 mg/24hr TD 24 hr patch 7 mg, 7 mg, Transdermal, Daily, 7 mg at 08/25/24 0938    ondansetron (ZOFRAN) injection 4 mg, 4 mg, Intravenous, Q6H PRN, 4 mg at 08/25/24 0019    pantoprazole (PROTONIX) injection 40 mg, 40 mg, Intravenous, Q12H PENELOPE, 40 mg at 08/25/24 0938    Physical Exam:   General Alert awake   Normocephalic atraumatic PERRLA  Lungs clear bilaterally  Cardiac normal S1 normal S2  Abdomen soft,non tender Bowel sounds present  No flatus no bowel movement  Skin: surgical dressing is C/D/I  Ext: No clubbing, cyanosis, edema    Lab, Imaging and other studies:  Recent Results (from the past 24 hour(s))   CBC and differential    Collection Time: 08/25/24  5:11 AM   Result Value Ref Range    WBC 14.23 (H) 4.31 - 10.16 Thousand/uL    RBC 5.29 3.88 - 5.62 Million/uL    Hemoglobin 15.1 12.0 - 17.0 g/dL    Hematocrit 45.1 36.5 - 49.3 %    MCV 85 82 - 98 fL    MCH 28.5 26.8 - 34.3 pg    MCHC 33.5 31.4 - 37.4 g/dL    RDW 15.2 (H) 11.6 - 15.1 %    MPV 12.0 8.9 - 12.7 fL    Platelets 203 149 - 390 Thousands/uL    nRBC 0 /100 WBCs    Segmented % 86 (H) 43 - 75 %    Immature Grans % 1 0 - 2 %    Lymphocytes % 6 (L) 14 - 44 %    Monocytes % 7 4 - 12 %    Eosinophils Relative 0 0 - 6 %    Basophils Relative 0 0 - 1 %    Absolute Neutrophils 12.20 (H) 1.85 - 7.62 Thousands/µL    Absolute Immature Grans 0.08 0.00 - 0.20 Thousand/uL    Absolute Lymphocytes 0.81 0.60 - 4.47 Thousands/µL    Absolute Monocytes 1.03 0.17 - 1.22 Thousand/µL    Eosinophils Absolute 0.06 0.00 - 0.61 Thousand/µL    Basophils Absolute 0.05 0.00 - 0.10 Thousands/µL   Basic metabolic panel    Collection Time: 08/25/24  5:11 AM   Result Value Ref Range    Sodium 138 135 - 147 mmol/L    Potassium 3.6 3.5 - 5.3 mmol/L    Chloride 101 96 - 108 mmol/L    CO2 22 21 - 32 mmol/L    ANION GAP 15 (H) 4 - 13 mmol/L    BUN 15 5 - 25 mg/dL    Creatinine 0.86 0.60 - 1.30 mg/dL    Glucose 96 65 - 140 mg/dL    Calcium 9.0 8.4 - 10.2 mg/dL    eGFR 99  ml/min/1.73sq m   Phosphorus    Collection Time: 08/25/24  5:11 AM   Result Value Ref Range    Phosphorus 4.0 2.7 - 4.5 mg/dL   Magnesium    Collection Time: 08/25/24  5:11 AM   Result Value Ref Range    Magnesium 2.0 1.9 - 2.7 mg/dL     VTE Pharmacologic Prophylaxis: Heparin  VTE Mechanical Prophylaxis: sequential compression device       Diet Orders   (From admission, onward)                 Start     Ordered    08/22/24 1542  Diet NPO; Sips with meds  Diet effective now        Comments: Clamp NGT for 1 hour when administering PO meds   References:    Adult Nutrition Support Algorithm    RD Therapeutic Diet Order Protocol   Question Answer Comment   Diet Type NPO    NPO Except: Sips with meds    RD to adjust diet per protocol? No        08/22/24 1541                .  Admitting Diagnosis: Colostomy status (HCC) [Z93.3]  Code Status: Level 1 - Full Code  Patient Active Problem List   Diagnosis    Large bowel obstruction (HCC)    Smoking    Substance use    Elevated blood pressure reading    Colonic obstruction (HCC)    Severe protein-calorie malnutrition (HCC)    Hypokalemia    Pancolitis (HCC)    Annual physical exam    Tachycardia    Anxiety about health    Colostomy status (HCC)     PT/OT/ST reviewed.  Plan was coordinated with Nursing staff & Case management.  Plan of care was discussed with patient and family member at bedside    Assessment/ Plan:  Davis Goss is a 54 y.o. male 3 day(s) POD # third s/p Jo's procedure reversal    Pain control  Pulmonary hygiene  DVT prophylaxis  OOB/Ambulation  Removal of genia tomorrow from the colostomy closure site  Labs reviewed potassium magnesium and Phos normal limit  Mildly elevated WBC count    Counseling / Coordination of Care  Total floor / unit time spent today 30minutes.  Greater than 50% of total time was spent with the patient and / or family counseling and / or coordination of care.     Steve Klein MD MS FRCS FACS  [facility]  08/25/24 10:59 AM  [facility]:  [address], [address] [place] [address]  Office  [phone] Fax [phone]    Portions of the record may have been created with voice recognition software. Occasional wrong word or \"sound a like\" substitutions may have occurred due to the inherent limitations of voice recognition software. Read the chart carefully and recognize, using context, where substitutions have occurred.        "

## 2024-08-26 LAB
ALBUMIN SERPL BCG-MCNC: 3.4 G/DL (ref 3.5–5)
ALP SERPL-CCNC: 43 U/L (ref 34–104)
ALT SERPL W P-5'-P-CCNC: 6 U/L (ref 7–52)
ANION GAP SERPL CALCULATED.3IONS-SCNC: 11 MMOL/L (ref 4–13)
AST SERPL W P-5'-P-CCNC: 9 U/L (ref 13–39)
BASOPHILS # BLD AUTO: 0.04 THOUSANDS/ÂΜL (ref 0–0.1)
BASOPHILS NFR BLD AUTO: 0 % (ref 0–1)
BILIRUB SERPL-MCNC: 0.76 MG/DL (ref 0.2–1)
BUN SERPL-MCNC: 16 MG/DL (ref 5–25)
CALCIUM ALBUM COR SERPL-MCNC: 9.4 MG/DL (ref 8.3–10.1)
CALCIUM SERPL-MCNC: 8.9 MG/DL (ref 8.4–10.2)
CHLORIDE SERPL-SCNC: 102 MMOL/L (ref 96–108)
CO2 SERPL-SCNC: 26 MMOL/L (ref 21–32)
CREAT SERPL-MCNC: 0.78 MG/DL (ref 0.6–1.3)
EOSINOPHIL # BLD AUTO: 0.27 THOUSAND/ÂΜL (ref 0–0.61)
EOSINOPHIL NFR BLD AUTO: 3 % (ref 0–6)
ERYTHROCYTE [DISTWIDTH] IN BLOOD BY AUTOMATED COUNT: 14.8 % (ref 11.6–15.1)
GFR SERPL CREATININE-BSD FRML MDRD: 103 ML/MIN/1.73SQ M
GLUCOSE SERPL-MCNC: 110 MG/DL (ref 65–140)
HCT VFR BLD AUTO: 40.9 % (ref 36.5–49.3)
HGB BLD-MCNC: 13.7 G/DL (ref 12–17)
IMM GRANULOCYTES # BLD AUTO: 0.03 THOUSAND/UL (ref 0–0.2)
IMM GRANULOCYTES NFR BLD AUTO: 0 % (ref 0–2)
LYMPHOCYTES # BLD AUTO: 1.19 THOUSANDS/ÂΜL (ref 0.6–4.47)
LYMPHOCYTES NFR BLD AUTO: 13 % (ref 14–44)
MCH RBC QN AUTO: 28.7 PG (ref 26.8–34.3)
MCHC RBC AUTO-ENTMCNC: 33.5 G/DL (ref 31.4–37.4)
MCV RBC AUTO: 86 FL (ref 82–98)
MONOCYTES # BLD AUTO: 0.98 THOUSAND/ÂΜL (ref 0.17–1.22)
MONOCYTES NFR BLD AUTO: 11 % (ref 4–12)
NEUTROPHILS # BLD AUTO: 6.57 THOUSANDS/ÂΜL (ref 1.85–7.62)
NEUTS SEG NFR BLD AUTO: 73 % (ref 43–75)
NRBC BLD AUTO-RTO: 0 /100 WBCS
PLATELET # BLD AUTO: 218 THOUSANDS/UL (ref 149–390)
PMV BLD AUTO: 11.2 FL (ref 8.9–12.7)
POTASSIUM SERPL-SCNC: 3.2 MMOL/L (ref 3.5–5.3)
PROT SERPL-MCNC: 6.7 G/DL (ref 6.4–8.4)
RBC # BLD AUTO: 4.77 MILLION/UL (ref 3.88–5.62)
SODIUM SERPL-SCNC: 139 MMOL/L (ref 135–147)
WBC # BLD AUTO: 9.08 THOUSAND/UL (ref 4.31–10.16)

## 2024-08-26 PROCEDURE — 85025 COMPLETE CBC W/AUTO DIFF WBC: CPT | Performed by: PHYSICIAN ASSISTANT

## 2024-08-26 PROCEDURE — 88304 TISSUE EXAM BY PATHOLOGIST: CPT | Performed by: STUDENT IN AN ORGANIZED HEALTH CARE EDUCATION/TRAINING PROGRAM

## 2024-08-26 PROCEDURE — 80053 COMPREHEN METABOLIC PANEL: CPT | Performed by: PHYSICIAN ASSISTANT

## 2024-08-26 PROCEDURE — 99024 POSTOP FOLLOW-UP VISIT: CPT | Performed by: SURGERY

## 2024-08-26 RX ORDER — LANOLIN ALCOHOL/MO/W.PET/CERES
3 CREAM (GRAM) TOPICAL
Status: DISCONTINUED | OUTPATIENT
Start: 2024-08-26 | End: 2024-08-28 | Stop reason: HOSPADM

## 2024-08-26 RX ORDER — MAGNESIUM HYDROXIDE/ALUMINUM HYDROXICE/SIMETHICONE 120; 1200; 1200 MG/30ML; MG/30ML; MG/30ML
30 SUSPENSION ORAL EVERY 4 HOURS PRN
Status: DISCONTINUED | OUTPATIENT
Start: 2024-08-26 | End: 2024-08-28 | Stop reason: HOSPADM

## 2024-08-26 RX ADMIN — ALUMINUM HYDROXIDE, MAGNESIUM HYDROXIDE, SIMETHICONE 30 ML: 200; 200; 20 LIQUID ORAL at 20:18

## 2024-08-26 RX ADMIN — HEPARIN SODIUM 5000 UNITS: 5000 INJECTION, SOLUTION INTRAVENOUS; SUBCUTANEOUS at 01:39

## 2024-08-26 RX ADMIN — PANTOPRAZOLE SODIUM 40 MG: 40 INJECTION, POWDER, FOR SOLUTION INTRAVENOUS at 20:14

## 2024-08-26 RX ADMIN — DIPHENHYDRAMINE HYDROCHLORIDE 25 MG: 50 INJECTION, SOLUTION INTRAMUSCULAR; INTRAVENOUS at 04:10

## 2024-08-26 RX ADMIN — SODIUM CHLORIDE, SODIUM LACTATE, POTASSIUM CHLORIDE, AND CALCIUM CHLORIDE 125 ML/HR: .6; .31; .03; .02 INJECTION, SOLUTION INTRAVENOUS at 14:45

## 2024-08-26 RX ADMIN — SODIUM CHLORIDE, SODIUM LACTATE, POTASSIUM CHLORIDE, AND CALCIUM CHLORIDE 125 ML/HR: .6; .31; .03; .02 INJECTION, SOLUTION INTRAVENOUS at 05:58

## 2024-08-26 RX ADMIN — AMLODIPINE BESYLATE 10 MG: 10 TABLET ORAL at 09:50

## 2024-08-26 RX ADMIN — Medication 3 MG: at 21:35

## 2024-08-26 RX ADMIN — HEPARIN SODIUM 5000 UNITS: 5000 INJECTION, SOLUTION INTRAVENOUS; SUBCUTANEOUS at 16:49

## 2024-08-26 RX ADMIN — HEPARIN SODIUM 5000 UNITS: 5000 INJECTION, SOLUTION INTRAVENOUS; SUBCUTANEOUS at 09:50

## 2024-08-26 RX ADMIN — PANTOPRAZOLE SODIUM 40 MG: 40 INJECTION, POWDER, FOR SOLUTION INTRAVENOUS at 09:50

## 2024-08-26 NOTE — PLAN OF CARE
Problem: GASTROINTESTINAL - ADULT  Goal: Minimal or absence of nausea and/or vomiting  Description: INTERVENTIONS:  - Administer IV fluids if ordered to ensure adequate hydration  - Maintain NPO status until nausea and vomiting are resolved  - Nasogastric tube if ordered  - Administer ordered antiemetic medications as needed  - Provide nonpharmacologic comfort measures as appropriate  - Advance diet as tolerated, if ordered  - Consider nutrition services referral to assist patient with adequate nutrition and appropriate food choices  Outcome: Progressing  Goal: Maintains or returns to baseline bowel function  Description: INTERVENTIONS:  - Assess bowel function  - Encourage oral fluids to ensure adequate hydration  - Administer IV fluids if ordered to ensure adequate hydration  - Administer ordered medications as needed  - Encourage mobilization and activity  - Consider nutritional services referral to assist patient with adequate nutrition and appropriate food choices  Outcome: Progressing  Goal: Maintains adequate nutritional intake  Description: INTERVENTIONS:  - Monitor percentage of each meal consumed  - Identify factors contributing to decreased intake, treat as appropriate  - Assist with meals as needed  - Monitor I&O, weight, and lab values if indicated  - Obtain nutrition services referral as needed  Outcome: Progressing  Goal: Establish and maintain optimal ostomy function  Description: INTERVENTIONS:  - Assess bowel function  - Encourage oral fluids to ensure adequate hydration  - Administer IV fluids if ordered to ensure adequate hydration   - Administer ordered medications as needed  - Encourage mobilization and activity  - Nutrition services referral to assist patient with appropriate food choices  - Assess stoma site  - Consider wound care consult   Outcome: Progressing  Goal: Oral mucous membranes remain intact  Description: INTERVENTIONS  - Assess oral mucosa and hygiene practices  - Implement  preventative oral hygiene regimen  - Implement oral medicated treatments as ordered  - Initiate Nutrition services referral as needed  Outcome: Progressing     Problem: MUSCULOSKELETAL - ADULT  Goal: Maintain or return mobility to safest level of function  Description: INTERVENTIONS:  - Assess patient's ability to carry out ADLs; assess patient's baseline for ADL function and identify physical deficits which impact ability to perform ADLs (bathing, care of mouth/teeth, toileting, grooming, dressing, etc.)  - Assess/evaluate cause of self-care deficits   - Assess range of motion  - Assess patient's mobility  - Assess patient's need for assistive devices and provide as appropriate  - Encourage maximum independence but intervene and supervise when necessary  - Involve family in performance of ADLs  - Assess for home care needs following discharge   - Consider OT consult to assist with ADL evaluation and planning for discharge  - Provide patient education as appropriate  Outcome: Progressing  Goal: Maintain proper alignment of affected body part  Description: INTERVENTIONS:  - Support, maintain and protect limb and body alignment  - Provide patient/ family with appropriate education  Outcome: Progressing     Problem: PAIN - ADULT  Goal: Verbalizes/displays adequate comfort level or baseline comfort level  Description: Interventions:  - Encourage patient to monitor pain and request assistance  - Assess pain using appropriate pain scale  - Administer analgesics based on type and severity of pain and evaluate response  - Implement non-pharmacological measures as appropriate and evaluate response  - Consider cultural and social influences on pain and pain management  - Notify physician/advanced practitioner if interventions unsuccessful or patient reports new pain  Outcome: Progressing

## 2024-08-26 NOTE — PROGRESS NOTES
"Progress Note - General Surgery   Davis Goss 54 y.o. male MRN: [id number]  Unit/Bed#: -01 Encounter: [id number]    Assessment:  Patient is passing flatus.    Plan:  Remove NG tube.  Start clear liquid diet.  Please send CBC and CMP for today.  Continue DVT prophylaxis.  Continue ambulation.  Continue with the drain for now.    Subjective/Objective   Chief Complaint: I am doing better    Subjective: Passing flatus    Objective: Passing flatus    Blood pressure 144/96, pulse 66, temperature (!) 96.2 °F (35.7 °C), temperature source Tympanic, resp. rate 16, height 5' 11\" (1.803 m), weight 76.6 kg (168 lb 14 oz), SpO2 96%.,Body mass index is 23.55 kg/m².      Intake/Output Summary (Last 24 hours) at 8/26/2024 0812  Last data filed at 8/26/2024 0600  Gross per 24 hour   Intake --   Output 1150 ml   Net -1150 ml       Invasive Devices       Peripheral Intravenous Line  Duration             Peripheral IV 08/22/24 Left;Ventral (anterior) Forearm 4 days    Peripheral IV 08/22/24 Right Hand 4 days              Drain  Duration             NG/OG/Enteral Tube Nasogastric Right nare 4 days    Closed/Suction Drain Anterior;Left Hip Bulb 15 Fr. 3 days                    Physical Exam: On physical exam patient looks comfortable.  Abdomen is soft.  Dressing removed today.  Both the midline dressing and colostomy site dressing looks healthy.  There is no pus.  Abdomen is soft.  There is left lower quadrant drain.  There is serosanguineous fluid in it.  NG tube canister has gastric juices mixed with bile.    Lab, Imaging and other studies:I have personally reviewed pertinent lab results.    VTE Pharmacologic Prophylaxis: Heparin  VTE Mechanical Prophylaxis: sequential compression device  "

## 2024-08-26 NOTE — CASE MANAGEMENT
Case Management Assessment & Discharge Planning Note    Patient name Davis Goss  Location /-01 MRN [id number]  : 1970 Date 2024       Current Admission Date: 2024  Current Admission Diagnosis:Colostomy status (HCC)   Patient Active Problem List    Diagnosis Date Noted Date Diagnosed    Colostomy status (HCC) 2024     Annual physical exam 2024     Tachycardia 2024     Anxiety about health 2024     Pancolitis (HCC) 2024     Severe protein-calorie malnutrition (HCC) 2024     Hypokalemia 2024     Colonic obstruction (HCC) 2024     Elevated blood pressure reading 2024     Large bowel obstruction (HCC) 2024     Smoking 2024     Substance use 2024       LOS (days): 4  Geometric Mean LOS (GMLOS) (days): 2.9  Days to GMLOS:-1.1     OBJECTIVE:    Risk of Unplanned Readmission Score: 15.8         Current admission status: Inpatient       Preferred Pharmacy:   [id number] - [place] [address]  Phone: [phone] Fax: [phone]    Primary Care Provider: WES Santillan    Primary Insurance: BLUE CROSS  Secondary Insurance:     ASSESSMENT:  Active Health Care Proxies       [facility] Representative - [facility] Phone: [phone] (Mobile)            Readmission Root Cause  30 Day Readmission: No    Patient Information  Admitted from:: Home  Mental Status: Alert  During Assessment patient was accompanied by: Not accompanied during assessment  Assessment information provided by:: Patient  Primary Caregiver: Self  Support Systems: Self, Family members, Friends/neighbors  County of Residence: [place]  What city do you live in?: [place]  Home entry access options. Select all that apply.: No steps to enter home  Type of Current Residence: Apartment  Floor Level: 1  Upon entering residence, is there a bedroom on the  main floor (no further steps)?: Yes  Upon entering residence, is there a bathroom on the main floor (no further steps)?: Yes  Living Arrangements: Lives Alone  Is patient a ?: No    Activities of Daily Living Prior to Admission  Functional Status: Independent  Completes ADLs independently?: Yes  Ambulates independently?: Yes  Does patient use assisted devices?: No  Does patient currently own DME?: No  Does patient have a history of Outpatient Therapy (PT/OT)?: No  Does the patient have a history of Short-Term Rehab?: No  Does patient have a history of HHC?: Yes (w/ [facility] S/P surgery earlier this year.)  Does patient currently have HHC?: No    Patient Information Continued  Income Source: Employed  Does patient have prescription coverage?: Yes  Does patient receive dialysis treatments?: No  Does patient have a history of substance abuse?: No  Does patient have a history of Mental Health Diagnosis?: No    PHQ 2/9 Screening   Reviewed PHQ 2/9 Depression Screening Score?: No    Means of Transportation  Means of Transport to Appts:: Drives Self      Social Determinants of Health (SDOH)      Flowsheet Row Most Recent Value   Housing Stability    In the last 12 months, was there a time when you were not able to pay the mortgage or rent on time? N   In the past 12 months, how many times have you moved where you were living? 0   At any time in the past 12 months, were you homeless or living in a shelter (including now)? N   Transportation Needs    In the past 12 months, has lack of transportation kept you from medical appointments or from getting medications? no   In the past 12 months, has lack of transportation kept you from meetings, work, or from getting things needed for daily living? No   Food Insecurity    Within the past 12 months, you worried that your food would run out before you got the money to buy more. Never true   Within the past 12 months, the food you bought just didn't last and you didn't  have money to get more. Never true   Utilities    In the past 12 months has the electric, gas, oil, or water company threatened to shut off services in your home? No          DISCHARGE DETAILS:    Discharge planning discussed with:: Patient  Freedom of Choice: Yes     CM contacted family/caregiver?: No- see comments (declined)  Were Treatment Team discharge recommendations reviewed with patient/caregiver?: Yes  Did patient/caregiver verbalize understanding of patient care needs?: Yes  Were patient/caregiver advised of the risks associated with not following Treatment Team discharge recommendations?: Yes    Requested Home Health Care         Is the patient interested in HHC at discharge?: No    DME Referral Provided  Referral made for DME?: No    Other Referral/Resources/Interventions Provided:  Interventions: None Indicated    Would you like to participate in our [facility] service program?  : No - Declined    Treatment Team Recommendation: Home  Discharge Destination Plan:: Home

## 2024-08-26 NOTE — PLAN OF CARE
Problem: NEUROSENSORY - ADULT  Goal: Achieves stable or improved neurological status  Description: INTERVENTIONS  - Monitor and report changes in neurological status  - Monitor vital signs such as temperature, blood pressure, glucose, and any other labs ordered   - Initiate measures to prevent increased intracranial pressure  - Monitor for seizure activity and implement precautions if appropriate      Outcome: Progressing  Goal: Remains free of injury related to seizures activity  Description: INTERVENTIONS  - Maintain airway, patient safety  and administer oxygen as ordered  - Monitor patient for seizure activity, document and report duration and description of seizure to physician/advanced practitioner  - If seizure occurs,  ensure patient safety during seizure  - Reorient patient post seizure  - Seizure pads on all 4 side rails  - Instruct patient/family to notify RN of any seizure activity including if an aura is experienced  - Instruct patient/family to call for assistance with activity based on nursing assessment  - Administer anti-seizure medications if ordered    Outcome: Progressing  Goal: Achieves maximal functionality and self care  Description: INTERVENTIONS  - Monitor swallowing and airway patency with patient fatigue and changes in neurological status  - Encourage and assist patient to increase activity and self care.   - Encourage visually impaired, hearing impaired and aphasic patients to use assistive/communication devices  Outcome: Progressing     Problem: CARDIOVASCULAR - ADULT  Goal: Maintains optimal cardiac output and hemodynamic stability  Description: INTERVENTIONS:  - Monitor I/O, vital signs and rhythm  - Monitor for S/S and trends of decreased cardiac output  - Administer and titrate ordered vasoactive medications to optimize hemodynamic stability  - Assess quality of pulses, skin color and temperature  - Assess for signs of decreased coronary artery perfusion  - Instruct patient to  report change in severity of symptoms  Outcome: Progressing  Goal: Absence of cardiac dysrhythmias or at baseline rhythm  Description: INTERVENTIONS:  - Continuous cardiac monitoring, vital signs, obtain 12 lead EKG if ordered  - Administer antiarrhythmic and heart rate control medications as ordered  - Monitor electrolytes and administer replacement therapy as ordered  Outcome: Progressing     Problem: RESPIRATORY - ADULT  Goal: Achieves optimal ventilation and oxygenation  Description: INTERVENTIONS:  - Assess for changes in respiratory status  - Assess for changes in mentation and behavior  - Position to facilitate oxygenation and minimize respiratory effort  - Oxygen administered by appropriate delivery if ordered  - Initiate smoking cessation education as indicated  - Encourage broncho-pulmonary hygiene including cough, deep breathe, Incentive Spirometry  - Assess the need for suctioning and aspirate as needed  - Assess and instruct to report SOB or any respiratory difficulty  - Respiratory Therapy support as indicated  Outcome: Progressing     Problem: GASTROINTESTINAL - ADULT  Goal: Minimal or absence of nausea and/or vomiting  Description: INTERVENTIONS:  - Administer IV fluids if ordered to ensure adequate hydration  - Maintain NPO status until nausea and vomiting are resolved  - Nasogastric tube if ordered  - Administer ordered antiemetic medications as needed  - Provide nonpharmacologic comfort measures as appropriate  - Advance diet as tolerated, if ordered  - Consider nutrition services referral to assist patient with adequate nutrition and appropriate food choices  Outcome: Progressing  Goal: Maintains or returns to baseline bowel function  Description: INTERVENTIONS:  - Assess bowel function  - Encourage oral fluids to ensure adequate hydration  - Administer IV fluids if ordered to ensure adequate hydration  - Administer ordered medications as needed  - Encourage mobilization and activity  - Consider  nutritional services referral to assist patient with adequate nutrition and appropriate food choices  Outcome: Progressing  Goal: Maintains adequate nutritional intake  Description: INTERVENTIONS:  - Monitor percentage of each meal consumed  - Identify factors contributing to decreased intake, treat as appropriate  - Assist with meals as needed  - Monitor I&O, weight, and lab values if indicated  - Obtain nutrition services referral as needed  Outcome: Progressing  Goal: Establish and maintain optimal ostomy function  Description: INTERVENTIONS:  - Assess bowel function  - Encourage oral fluids to ensure adequate hydration  - Administer IV fluids if ordered to ensure adequate hydration   - Administer ordered medications as needed  - Encourage mobilization and activity  - Nutrition services referral to assist patient with appropriate food choices  - Assess stoma site  - Consider wound care consult   Outcome: Progressing  Goal: Oral mucous membranes remain intact  Description: INTERVENTIONS  - Assess oral mucosa and hygiene practices  - Implement preventative oral hygiene regimen  - Implement oral medicated treatments as ordered  - Initiate Nutrition services referral as needed  Outcome: Progressing     Problem: GENITOURINARY - ADULT  Goal: Maintains or returns to baseline urinary function  Description: INTERVENTIONS:  - Assess urinary function  - Encourage oral fluids to ensure adequate hydration if ordered  - Administer IV fluids as ordered to ensure adequate hydration  - Administer ordered medications as needed  - Offer frequent toileting  - Follow urinary retention protocol if ordered  Outcome: Progressing  Goal: Absence of urinary retention  Description: INTERVENTIONS:  - Assess patient’s ability to void and empty bladder  - Monitor I/O  - Bladder scan as needed  - Discuss with physician/AP medications to alleviate retention as needed  - Discuss catheterization for long term situations as appropriate  Outcome:  Progressing  Goal: Urinary catheter remains patent  Description: INTERVENTIONS:  - Assess patency of urinary catheter  - If patient has a chronic benson, consider changing catheter if non-functioning  - Follow guidelines for intermittent irrigation of non-functioning urinary catheter  Outcome: Progressing     Problem: METABOLIC, FLUID AND ELECTROLYTES - ADULT  Goal: Electrolytes maintained within normal limits  Description: INTERVENTIONS:  - Monitor labs and assess patient for signs and symptoms of electrolyte imbalances  - Administer electrolyte replacement as ordered  - Monitor response to electrolyte replacements, including repeat lab results as appropriate  - Instruct patient on fluid and nutrition as appropriate  Outcome: Progressing  Goal: Fluid balance maintained  Description: INTERVENTIONS:  - Monitor labs   - Monitor I/O and WT  - Instruct patient on fluid and nutrition as appropriate  - Assess for signs & symptoms of volume excess or deficit  Outcome: Progressing  Goal: Glucose maintained within target range  Description: INTERVENTIONS:  - Monitor Blood Glucose as ordered  - Assess for signs and symptoms of hyperglycemia and hypoglycemia  - Administer ordered medications to maintain glucose within target range  - Assess nutritional intake and initiate nutrition service referral as needed  Outcome: Progressing      Problem: HEMATOLOGIC - ADULT  Goal: Maintains hematologic stability  Description: INTERVENTIONS  - Assess for signs and symptoms of bleeding or hemorrhage  - Monitor labs  - Administer supportive blood products/factors as ordered and appropriate  Outcome: Progressing     Problem: MUSCULOSKELETAL - ADULT  Goal: Maintain or return mobility to safest level of function  Description: INTERVENTIONS:  - Assess patient's ability to carry out ADLs; assess patient's baseline for ADL function and identify physical deficits which impact ability to perform ADLs (bathing, care of mouth/teeth, toileting,  grooming, dressing, etc.)  - Assess/evaluate cause of self-care deficits   - Assess range of motion  - Assess patient's mobility  - Assess patient's need for assistive devices and provide as appropriate  - Encourage maximum independence but intervene and supervise when necessary  - Involve family in performance of ADLs  - Assess for home care needs following discharge   - Consider OT consult to assist with ADL evaluation and planning for discharge  - Provide patient education as appropriate  Outcome: Progressing  Goal: Maintain proper alignment of affected body part  Description: INTERVENTIONS:  - Support, maintain and protect limb and body alignment  - Provide patient/ family with appropriate education  Outcome: Progressing     Problem: PAIN - ADULT  Goal: Verbalizes/displays adequate comfort level or baseline comfort level  Description: Interventions:  - Encourage patient to monitor pain and request assistance  - Assess pain using appropriate pain scale  - Administer analgesics based on type and severity of pain and evaluate response  - Implement non-pharmacological measures as appropriate and evaluate response  - Consider cultural and social influences on pain and pain management  - Notify physician/advanced practitioner if interventions unsuccessful or patient reports new pain  Outcome: Progressing     Problem: DISCHARGE PLANNING  Goal: Discharge to home or other facility with appropriate resources  Description: INTERVENTIONS:  - Identify barriers to discharge w/patient and caregiver  - Arrange for needed discharge resources and transportation as appropriate  - Identify discharge learning needs (meds, wound care, etc.)  - Arrange for interpretive services to assist at discharge as needed  - Refer to Case Management Department for coordinating discharge planning if the patient needs post-hospital services based on physician/advanced practitioner order or complex needs related to functional status, cognitive  ability, or social support system  Outcome: Progressing     Problem: Knowledge Deficit  Goal: Patient/family/caregiver demonstrates understanding of disease process, treatment plan, medications, and discharge instructions  Description: Complete learning assessment and assess knowledge base.  Interventions:  - Provide teaching at level of understanding  - Provide teaching via preferred learning methods  Outcome: Progressing     Problem: Prexisting or High Potential for Compromised Skin Integrity  Goal: Skin integrity is maintained or improved  Description: INTERVENTIONS:  - Identify patients at risk for skin breakdown  - Assess and monitor skin integrity  - Assess and monitor nutrition and hydration status  - Monitor labs   - Assess for incontinence   - Turn and reposition patient  - Assist with mobility/ambulation  - Relieve pressure over bony prominences  - Avoid friction and shearing  - Provide appropriate hygiene as needed including keeping skin clean and dry  - Evaluate need for skin moisturizer/barrier cream  - Collaborate with interdisciplinary team   - Patient/family teaching  - Consider wound care consult   Outcome: Progressing

## 2024-08-26 NOTE — NUTRITION
Recommend to advance diet as tolerated, goal being low-fiber, low residue.     Pt would benefit from initiation of ensure clear 1-2x/d, no preference for apple or berry. RD does not have protocol under diet order to be able to add supplement at this time. Pt is agreeable to supplements.

## 2024-08-27 LAB
ALBUMIN SERPL BCG-MCNC: 2.8 G/DL (ref 3.5–5)
ALP SERPL-CCNC: 38 U/L (ref 34–104)
ALT SERPL W P-5'-P-CCNC: 8 U/L (ref 7–52)
ANION GAP SERPL CALCULATED.3IONS-SCNC: 11 MMOL/L (ref 4–13)
AST SERPL W P-5'-P-CCNC: 12 U/L (ref 13–39)
BASOPHILS # BLD AUTO: 0.04 THOUSANDS/ÂΜL (ref 0–0.1)
BASOPHILS NFR BLD AUTO: 1 % (ref 0–1)
BILIRUB SERPL-MCNC: 0.77 MG/DL (ref 0.2–1)
BUN SERPL-MCNC: 7 MG/DL (ref 5–25)
CALCIUM ALBUM COR SERPL-MCNC: 9.2 MG/DL (ref 8.3–10.1)
CALCIUM SERPL-MCNC: 8.2 MG/DL (ref 8.4–10.2)
CHLORIDE SERPL-SCNC: 101 MMOL/L (ref 96–108)
CO2 SERPL-SCNC: 25 MMOL/L (ref 21–32)
CREAT SERPL-MCNC: 0.65 MG/DL (ref 0.6–1.3)
EOSINOPHIL # BLD AUTO: 0.2 THOUSAND/ÂΜL (ref 0–0.61)
EOSINOPHIL NFR BLD AUTO: 3 % (ref 0–6)
ERYTHROCYTE [DISTWIDTH] IN BLOOD BY AUTOMATED COUNT: 14.4 % (ref 11.6–15.1)
GFR SERPL CREATININE-BSD FRML MDRD: 111 ML/MIN/1.73SQ M
GLUCOSE SERPL-MCNC: 88 MG/DL (ref 65–140)
HCT VFR BLD AUTO: 35.3 % (ref 36.5–49.3)
HGB BLD-MCNC: 12 G/DL (ref 12–17)
IMM GRANULOCYTES # BLD AUTO: 0.02 THOUSAND/UL (ref 0–0.2)
IMM GRANULOCYTES NFR BLD AUTO: 0 % (ref 0–2)
LYMPHOCYTES # BLD AUTO: 0.93 THOUSANDS/ÂΜL (ref 0.6–4.47)
LYMPHOCYTES NFR BLD AUTO: 12 % (ref 14–44)
MCH RBC QN AUTO: 29 PG (ref 26.8–34.3)
MCHC RBC AUTO-ENTMCNC: 34 G/DL (ref 31.4–37.4)
MCV RBC AUTO: 85 FL (ref 82–98)
MONOCYTES # BLD AUTO: 0.83 THOUSAND/ÂΜL (ref 0.17–1.22)
MONOCYTES NFR BLD AUTO: 10 % (ref 4–12)
NEUTROPHILS # BLD AUTO: 5.98 THOUSANDS/ÂΜL (ref 1.85–7.62)
NEUTS SEG NFR BLD AUTO: 74 % (ref 43–75)
NRBC BLD AUTO-RTO: 0 /100 WBCS
PLATELET # BLD AUTO: 217 THOUSANDS/UL (ref 149–390)
PMV BLD AUTO: 12.1 FL (ref 8.9–12.7)
POTASSIUM SERPL-SCNC: 3.1 MMOL/L (ref 3.5–5.3)
PROT SERPL-MCNC: 5.5 G/DL (ref 6.4–8.4)
RBC # BLD AUTO: 4.14 MILLION/UL (ref 3.88–5.62)
SODIUM SERPL-SCNC: 137 MMOL/L (ref 135–147)
WBC # BLD AUTO: 8 THOUSAND/UL (ref 4.31–10.16)

## 2024-08-27 PROCEDURE — 85025 COMPLETE CBC W/AUTO DIFF WBC: CPT | Performed by: PHYSICIAN ASSISTANT

## 2024-08-27 PROCEDURE — 80053 COMPREHEN METABOLIC PANEL: CPT | Performed by: PHYSICIAN ASSISTANT

## 2024-08-27 PROCEDURE — 99024 POSTOP FOLLOW-UP VISIT: CPT | Performed by: PHYSICIAN ASSISTANT

## 2024-08-27 RX ORDER — POTASSIUM CHLORIDE 14.9 MG/ML
20 INJECTION INTRAVENOUS
Status: COMPLETED | OUTPATIENT
Start: 2024-08-27 | End: 2024-08-27

## 2024-08-27 RX ORDER — POTASSIUM CHLORIDE 29.8 MG/ML
40 INJECTION INTRAVENOUS ONCE
Status: DISCONTINUED | OUTPATIENT
Start: 2024-08-27 | End: 2024-08-27

## 2024-08-27 RX ORDER — SODIUM CHLORIDE, SODIUM LACTATE, POTASSIUM CHLORIDE, CALCIUM CHLORIDE 600; 310; 30; 20 MG/100ML; MG/100ML; MG/100ML; MG/100ML
50 INJECTION, SOLUTION INTRAVENOUS CONTINUOUS
Status: DISCONTINUED | OUTPATIENT
Start: 2024-08-27 | End: 2024-08-28

## 2024-08-27 RX ADMIN — SODIUM CHLORIDE, SODIUM LACTATE, POTASSIUM CHLORIDE, AND CALCIUM CHLORIDE 125 ML/HR: .6; .31; .03; .02 INJECTION, SOLUTION INTRAVENOUS at 00:29

## 2024-08-27 RX ADMIN — PANTOPRAZOLE SODIUM 40 MG: 40 INJECTION, POWDER, FOR SOLUTION INTRAVENOUS at 21:58

## 2024-08-27 RX ADMIN — HEPARIN SODIUM 5000 UNITS: 5000 INJECTION, SOLUTION INTRAVENOUS; SUBCUTANEOUS at 09:03

## 2024-08-27 RX ADMIN — HEPARIN SODIUM 5000 UNITS: 5000 INJECTION, SOLUTION INTRAVENOUS; SUBCUTANEOUS at 17:40

## 2024-08-27 RX ADMIN — SODIUM CHLORIDE, SODIUM LACTATE, POTASSIUM CHLORIDE, AND CALCIUM CHLORIDE 50 ML/HR: .6; .31; .03; .02 INJECTION, SOLUTION INTRAVENOUS at 14:17

## 2024-08-27 RX ADMIN — PANTOPRAZOLE SODIUM 40 MG: 40 INJECTION, POWDER, FOR SOLUTION INTRAVENOUS at 09:03

## 2024-08-27 RX ADMIN — POTASSIUM CHLORIDE 20 MEQ: 14.9 INJECTION, SOLUTION INTRAVENOUS at 10:30

## 2024-08-27 RX ADMIN — HEPARIN SODIUM 5000 UNITS: 5000 INJECTION, SOLUTION INTRAVENOUS; SUBCUTANEOUS at 01:08

## 2024-08-27 RX ADMIN — AMLODIPINE BESYLATE 10 MG: 10 TABLET ORAL at 09:03

## 2024-08-27 RX ADMIN — Medication 3 MG: at 21:58

## 2024-08-27 RX ADMIN — POTASSIUM CHLORIDE 20 MEQ: 14.9 INJECTION, SOLUTION INTRAVENOUS at 09:03

## 2024-08-27 NOTE — UTILIZATION REVIEW
Elective Surgical Continued Stay Review    Date: 8/27/24    POD#: 5 Days Post-Op   Current Patient Class: Inpatient  Current Level of Care: Med Surg     Assessment/Plan: 54 y.o. male, initial surgery date 8/22/24 s/p Exp lap lysis adhesions and reversal of colostomy. Pt is passing flatus and reports doing better. On exam, abd is soft. Dressing removed today. Both midline dressing and colostomy site dressing looks healthy without pus. L lower quad drain in place with serosanguineous fluid in it. NG tube canister has gastric juices mixed w/ bile.  Plan: remove NG tube. Start liquid diet. Continue IV fluids. CBC and CMP today. Continue w/ drain for now. Continue DVT prophylaxis and ambulation.     Vital Signs (last 3 days)       Date/Time Temp Pulse Resp BP MAP (mmHg) SpO2 O2 Device Patient Position - Orthostatic VS [place] Coma Scale Score Pain    08/27/24 0713 98.4 °F (36.9 °C) 90 18 143/102 -- 96 % -- Lying -- --    08/27/24 0111 -- 80 -- 139/101 116 -- -- -- -- --    08/26/24 2216 98 °F (36.7 °C) 91 18 138/103 120 98 % None (Room air) Lying -- --    08/26/24 2000 -- -- -- -- -- -- None (Room air) -- 15 2    08/26/24 1505 98.2 °F (36.8 °C) 91 16 130/94 -- 94 % None (Room air) Lying -- --    08/26/24 0900 -- -- -- -- -- -- -- -- 15 No Pain    08/26/24 0803 96.2 °F (35.7 °C) 66 16 144/96 -- 96 % None (Room air) Lying -- --    08/25/24 2334 98.5 °F (36.9 °C) 86 18 136/98 111 97 % None (Room air) Lying -- --    08/25/24 2000 -- -- -- -- -- -- -- -- 15 5    08/25/24 1555 97.6 °F (36.4 °C) 92 20 156/103 121 97 % None (Room air) Lying -- --    08/25/24 1300 -- -- -- -- -- -- -- -- -- No Pain    08/25/24 0900 -- -- -- -- -- -- None (Room air) -- 15 No Pain    08/25/24 0708 97.8 °F (36.6 °C) 87 25 146/100 -- 95 % -- Lying -- --    08/24/24 2310 98.1 °F (36.7 °C) 99 20 154/99 122 97 % None (Room air) Lying -- --    08/24/24 2010 -- -- -- -- -- -- -- -- 15 2    08/24/24 1837 -- -- -- -- -- -- -- -- -- 5    08/24/24 1530 98.3  °F (36.8 °C) 98 22 148/95 -- 96 % -- Lying -- --    08/24/24 1410 -- -- -- -- -- -- -- -- -- 5    08/24/24 1200 -- -- -- -- -- -- None (Room air) -- 15 --    08/24/24 0900 -- -- -- -- -- -- -- -- -- No Pain    08/24/24 0717 99.6 °F (37.6 °C) 91 17 149/99 -- 98 % -- Lying -- --    08/24/24 0502 98 °F (36.7 °C) 98 20 133/98 -- 97 % None (Room air) Lying -- --    08/24/24 0023 -- -- -- -- -- -- -- -- -- 6          Weight (last 2 days)       None            Pertinent Labs/Diagnostic Results:     Results from last 7 days   Lab Units 08/27/24  0503 08/26/24  1239 08/25/24  0511 08/24/24  0506 08/23/24  0516   WBC Thousand/uL 8.00 9.08 14.23* 13.74* 18.61*   HEMOGLOBIN g/dL 12.0 13.7 15.1 13.8 13.2   HEMATOCRIT % 35.3* 40.9 45.1 41.1 40.4   PLATELETS Thousands/uL 217 218 203 191 214   TOTAL NEUT ABS Thousands/µL 5.98 6.57 12.20* 11.08* 15.76*         Results from last 7 days   Lab Units 08/27/24  0503 08/26/24  1239 08/25/24  0511 08/24/24  0506 08/23/24  0516   SODIUM mmol/L 137 139 138 137 139   POTASSIUM mmol/L 3.1* 3.2* 3.6 3.2* 3.6   CHLORIDE mmol/L 101 102 101 99 103   CO2 mmol/L 25 26 22 26 27   ANION GAP mmol/L 11 11 15* 12 9   BUN mg/dL 7 16 15 11 9   CREATININE mg/dL 0.65 0.78 0.86 0.93 0.97   EGFR ml/min/1.73sq m 111 103 99 94 89   CALCIUM mg/dL 8.2* 8.9 9.0 9.2 8.7   MAGNESIUM mg/dL  --   --  2.0 1.6*  --    PHOSPHORUS mg/dL  --   --  4.0 2.7  --      Results from last 7 days   Lab Units 08/27/24  0503 08/26/24  1239 08/23/24  0516   AST U/L 12* 9* 13   ALT U/L 8 6* 11   ALK PHOS U/L 38 43 58   TOTAL PROTEIN g/dL 5.5* 6.7 7.0   ALBUMIN g/dL 2.8* 3.4* 3.7   TOTAL BILIRUBIN mg/dL 0.77 0.76 0.90         Results from last 7 days   Lab Units 08/27/24  0503 08/26/24  1239 08/25/24  0511 08/24/24  0506 08/23/24  0516   GLUCOSE RANDOM mg/dL 88 110 96 78 95         Results from last 7 days   Lab Units 08/22/24  0655   PROTIME seconds 15.2*   INR  1.16   PTT seconds 30     Medications:   Scheduled  Medications:  amLODIPine, 10 mg, Oral, Daily  heparin (porcine), 5,000 Units, Subcutaneous, Q8H PENELOPE  melatonin, 3 mg, Oral, HS  nicotine, 7 mg, Transdermal, Daily  pantoprazole, 40 mg, Intravenous, Q12H PENELOPE  potassium chloride, 20 mEq, Intravenous, Q2H    Continuous IV Infusions:  lactated ringers, 125 mL/hr, Intravenous, Continuous      PRN Meds:  acetaminophen, 1,000 mg, Intravenous, Q6H PRN  aluminum-magnesium hydroxide-simethicone, 30 mL, Oral, Q4H PRN - given x1 8/26.  diphenhydrAMINE, 25 mg, Intravenous, Q6H PRN given x2 8/25 an x1 8/26.  HYDROmorphone, 0.2 mg, Intravenous, Q3H PRN  morphine injection, 2 mg, Intravenous, Q4H PRN given x1 8/25  morphine injection, 4 mg, Intravenous, Q4H PRN  naloxone, 0.1 mg, Intravenous, Q3 min PRN  ondansetron, 4 mg, Intravenous, Q6H PRN given x1 8/25      Discharge Plan: D    Network Utilization Review Department  ATTENTION: Please call with any questions or concerns to [phone] and carefully listen to the prompts so that you are directed to the right person. All voicemails are confidential.   For Discharge needs, contact Care Management DC Support Team at [phone] opt. 2  Send all requests for admission clinical reviews, approved or denied determinations and any other requests to dedicated fax number below belonging to the campus where the patient is receiving treatment. List of dedicated fax numbers for the Facilities:  FACILITY NAME UR FAX NUMBER   ADMISSION DENIALS (Administrative/Medical Necessity) [phone]   DISCHARGE SUPPORT TEAM (NETWORK) [phone]   PARENT CHILD HEALTH (Maternity/NICU/Pediatrics) [phone]   [facility] [phone]   [facility] [phone]   [facility] [phone]   [facility] [phone]   [facility] [phone]   [facility] [phone]   [facility]  [place] [phone]   [facility] [phone]   [facility] [phone]   [facility] [phone]   [facility] [phone]   [facility] [phone]

## 2024-08-27 NOTE — PLAN OF CARE
Problem: NEUROSENSORY - ADULT  Goal: Achieves stable or improved neurological status  Description: INTERVENTIONS  - Monitor and report changes in neurological status  - Monitor vital signs such as temperature, blood pressure, glucose, and any other labs ordered   - Initiate measures to prevent increased intracranial pressure  - Monitor for seizure activity and implement precautions if appropriate      Outcome: Progressing     Problem: CARDIOVASCULAR - ADULT  Goal: Maintains optimal cardiac output and hemodynamic stability  Description: INTERVENTIONS:  - Monitor I/O, vital signs and rhythm  - Monitor for S/S and trends of decreased cardiac output  - Administer and titrate ordered vasoactive medications to optimize hemodynamic stability  - Assess quality of pulses, skin color and temperature  - Assess for signs of decreased coronary artery perfusion  - Instruct patient to report change in severity of symptoms  Outcome: Progressing     Problem: RESPIRATORY - ADULT  Goal: Achieves optimal ventilation and oxygenation  Description: INTERVENTIONS:  - Assess for changes in respiratory status  - Assess for changes in mentation and behavior  - Position to facilitate oxygenation and minimize respiratory effort  - Oxygen administered by appropriate delivery if ordered  - Initiate smoking cessation education as indicated  - Encourage broncho-pulmonary hygiene including cough, deep breathe, Incentive Spirometry  - Assess the need for suctioning and aspirate as needed  - Assess and instruct to report SOB or any respiratory difficulty  - Respiratory Therapy support as indicated  Outcome: Progressing     Problem: GASTROINTESTINAL - ADULT  Goal: Minimal or absence of nausea and/or vomiting  Description: INTERVENTIONS:  - Administer IV fluids if ordered to ensure adequate hydration  - Maintain NPO status until nausea and vomiting are resolved  - Nasogastric tube if ordered  - Administer ordered antiemetic medications as needed  -  The PCA sig page, POC sig page and KENNA were received and saved in member folder.     Martín Pinzon  Care Management Specialist  [facility]  [phone]     Provide nonpharmacologic comfort measures as appropriate  - Advance diet as tolerated, if ordered  - Consider nutrition services referral to assist patient with adequate nutrition and appropriate food choices  Outcome: Progressing

## 2024-08-27 NOTE — PROGRESS NOTES
"Progress Note - General Surgery   Davis Goss 54 y.o. male MRN: [id number]  Unit/Bed#: -01 Encounter: [id number]    ASSESSMENT:  POD#5 s/p elective open Jo's reversal, RONA  -Afebrile, vital signs stable  -No leukocytosis, Hgb stable 12, creatinine stable, hypoalbuminemia 2.8  -Mild hypokalemia, 3.1  -UOP 1.6 L  -JOE drain 5 cc serosanguineous in bulb, serous in tubing, dressing on unsaturated  -Incisions with staples intact, no signs of infection  -Exhibiting signs of bowel function    PLAN:  -Advance to fulls toast and crackers  -Add Ensure supplements  -PO home meds  -Analgesia/antiemetics as needed, adjust analgesics to oral form with as needed IV for breakthrough  -Scheduled PPI  -Replete potassium  -Decrease fluid rate  -Courage out of bed ambulation  -Encourage incentive spirometer use  -Morning labs, check magnesium  -Will continue to advance diet as tolerated, expect discharge within the next 24 to 48 hours.    SUBJECTIVE:  No acute complaints.  Tolerating clear liquids.  No nausea/vomiting.  Minimal incisional related tenderness.  Voiding without any issues.  Passing some gas.  Had a large bowel movement last night without difficulty.  Out of bed as able.  Chest pain, shortness of breath, calf pain.      OBJECTIVE:   Vitals:  Blood pressure (!) 143/102, pulse 90, temperature 98.4 °F (36.9 °C), temperature source Tympanic, resp. rate 18, height 5' 11\" (1.803 m), weight 76.6 kg (168 lb 14 oz), SpO2 96%.,Body mass index is 23.55 kg/m².    I/Os:    Intake/Output Summary (Last 24 hours) at 8/27/2024 1215  Last data filed at 8/27/2024 0501  Gross per 24 hour   Intake --   Output 1375 ml   Net -1375 ml       Lines/Drains:  Invasive Devices       Peripheral Intravenous Line  Duration             Peripheral IV 08/27/24 Left;Proximal;Ventral (anterior) Forearm <1 day              Drain  Duration             Closed/Suction Drain Anterior;Left Hip Bulb 15 Fr. 5 days                    Physical Exam  Vitals " reviewed.   HENT:      Head: Normocephalic and atraumatic.   Cardiovascular:      Rate and Rhythm: Normal rate and regular rhythm.   Pulmonary:      Effort: Pulmonary effort is normal.      Breath sounds: No wheezing, rhonchi or rales.   Abdominal:      General: Bowel sounds are normal. There is no distension.      Palpations: Abdomen is soft.      Tenderness: There is abdominal tenderness (Minimal incision related tenderness). There is no guarding or rebound.      Comments: Midline incision and prior colostomy site with staples intact, no drainage, no erythema or edema, no induration or fluctuance  JOE drain intact draining serous fluid   Neurological:      Mental Status: He is alert.         Diagnostics:  I have personally reviewed pertinent lab results.     No results found.  Recent Results (from the past 36 hour(s))   CBC and differential    Collection Time: 08/26/24 12:39 PM   Result Value Ref Range    WBC 9.08 4.31 - 10.16 Thousand/uL    RBC 4.77 3.88 - 5.62 Million/uL    Hemoglobin 13.7 12.0 - 17.0 g/dL    Hematocrit 40.9 36.5 - 49.3 %    MCV 86 82 - 98 fL    MCH 28.7 26.8 - 34.3 pg    MCHC 33.5 31.4 - 37.4 g/dL    RDW 14.8 11.6 - 15.1 %    MPV 11.2 8.9 - 12.7 fL    Platelets 218 149 - 390 Thousands/uL    nRBC 0 /100 WBCs    Segmented % 73 43 - 75 %    Immature Grans % 0 0 - 2 %    Lymphocytes % 13 (L) 14 - 44 %    Monocytes % 11 4 - 12 %    Eosinophils Relative 3 0 - 6 %    Basophils Relative 0 0 - 1 %    Absolute Neutrophils 6.57 1.85 - 7.62 Thousands/µL    Absolute Immature Grans 0.03 0.00 - 0.20 Thousand/uL    Absolute Lymphocytes 1.19 0.60 - 4.47 Thousands/µL    Absolute Monocytes 0.98 0.17 - 1.22 Thousand/µL    Eosinophils Absolute 0.27 0.00 - 0.61 Thousand/µL    Basophils Absolute 0.04 0.00 - 0.10 Thousands/µL   Comprehensive metabolic panel    Collection Time: 08/26/24 12:39 PM   Result Value Ref Range    Sodium 139 135 - 147 mmol/L    Potassium 3.2 (L) 3.5 - 5.3 mmol/L    Chloride 102 96 - 108 mmol/L     CO2 26 21 - 32 mmol/L    ANION GAP 11 4 - 13 mmol/L    BUN 16 5 - 25 mg/dL    Creatinine 0.78 0.60 - 1.30 mg/dL    Glucose 110 65 - 140 mg/dL    Calcium 8.9 8.4 - 10.2 mg/dL    Corrected Calcium 9.4 8.3 - 10.1 mg/dL    AST 9 (L) 13 - 39 U/L    ALT 6 (L) 7 - 52 U/L    Alkaline Phosphatase 43 34 - 104 U/L    Total Protein 6.7 6.4 - 8.4 g/dL    Albumin 3.4 (L) 3.5 - 5.0 g/dL    Total Bilirubin 0.76 0.20 - 1.00 mg/dL    eGFR 103 ml/min/1.73sq m   CBC and differential    Collection Time: 08/27/24  5:03 AM   Result Value Ref Range    WBC 8.00 4.31 - 10.16 Thousand/uL    RBC 4.14 3.88 - 5.62 Million/uL    Hemoglobin 12.0 12.0 - 17.0 g/dL    Hematocrit 35.3 (L) 36.5 - 49.3 %    MCV 85 82 - 98 fL    MCH 29.0 26.8 - 34.3 pg    MCHC 34.0 31.4 - 37.4 g/dL    RDW 14.4 11.6 - 15.1 %    MPV 12.1 8.9 - 12.7 fL    Platelets 217 149 - 390 Thousands/uL    nRBC 0 /100 WBCs    Segmented % 74 43 - 75 %    Immature Grans % 0 0 - 2 %    Lymphocytes % 12 (L) 14 - 44 %    Monocytes % 10 4 - 12 %    Eosinophils Relative 3 0 - 6 %    Basophils Relative 1 0 - 1 %    Absolute Neutrophils 5.98 1.85 - 7.62 Thousands/µL    Absolute Immature Grans 0.02 0.00 - 0.20 Thousand/uL    Absolute Lymphocytes 0.93 0.60 - 4.47 Thousands/µL    Absolute Monocytes 0.83 0.17 - 1.22 Thousand/µL    Eosinophils Absolute 0.20 0.00 - 0.61 Thousand/µL    Basophils Absolute 0.04 0.00 - 0.10 Thousands/µL   Comprehensive metabolic panel    Collection Time: 08/27/24  5:03 AM   Result Value Ref Range    Sodium 137 135 - 147 mmol/L    Potassium 3.1 (L) 3.5 - 5.3 mmol/L    Chloride 101 96 - 108 mmol/L    CO2 25 21 - 32 mmol/L    ANION GAP 11 4 - 13 mmol/L    BUN 7 5 - 25 mg/dL    Creatinine 0.65 0.60 - 1.30 mg/dL    Glucose 88 65 - 140 mg/dL    Calcium 8.2 (L) 8.4 - 10.2 mg/dL    Corrected Calcium 9.2 8.3 - 10.1 mg/dL    AST 12 (L) 13 - 39 U/L    ALT 8 7 - 52 U/L    Alkaline Phosphatase 38 34 - 104 U/L    Total Protein 5.5 (L) 6.4 - 8.4 g/dL    Albumin 2.8 (L) 3.5 - 5.0  g/dL    Total Bilirubin 0.77 0.20 - 1.00 mg/dL    eGFR 111 ml/min/1.73sq m       Current Medications:  Scheduled Meds:  Current Facility-Administered Medications   Medication Dose Route Frequency Provider Last Rate    acetaminophen  1,000 mg Intravenous Q6H PRN Chaim Cristobal PA-C      aluminum-magnesium hydroxide-simethicone  30 mL Oral Q4H PRN Steve Klein MD      amLODIPine  10 mg Oral Daily Sandra Alexander PA-C      diphenhydrAMINE  25 mg Intravenous Q6H PRN Chaim Cristobal PA-C      heparin (porcine)  5,000 Units Subcutaneous Q8H [facility] Sandra Alexander PA-C      HYDROmorphone  0.2 mg Intravenous Q3H PRN Chaim Cristobal PA-C      lactated ringers  125 mL/hr Intravenous Continuous Sandra Alexander PA-C 125 mL/hr (08/27/24 0029)    melatonin  3 mg Oral HS Steve Klein MD      morphine injection  2 mg Intravenous Q4H PRN Chaim Cristobal PA-C      morphine injection  4 mg Intravenous Q4H PRN Chaim Cristobal PA-C      naloxone  0.1 mg Intravenous Q3 min PRN Sandra Alexander PA-C      nicotine  7 mg Transdermal Daily Sandra Alexander PA-C      ondansetron  4 mg Intravenous Q6H PRN Sandra Alexander PA-C      pantoprazole  40 mg Intravenous Q12H [facility] Sandra Alexander PA-C      potassium chloride  20 mEq Intravenous Q2H Surekha Michelle PA-C 20 mEq (08/27/24 1030)     Continuous Infusions:lactated ringers, 125 mL/hr, Last Rate: 125 mL/hr (08/27/24 0029)      PRN Meds:    acetaminophen    aluminum-magnesium hydroxide-simethicone    diphenhydrAMINE    HYDROmorphone    morphine injection    morphine injection    naloxone    ondansetron      Surekha Michelle PA-C  8/27/2024

## 2024-08-28 ENCOUNTER — TELEPHONE (OUTPATIENT)
Dept: NUTRITION | Facility: HOSPITAL | Age: 54
End: 2024-08-28

## 2024-08-28 VITALS
HEART RATE: 84 BPM | OXYGEN SATURATION: 98 % | BODY MASS INDEX: 23.64 KG/M2 | SYSTOLIC BLOOD PRESSURE: 133 MMHG | TEMPERATURE: 98.6 F | HEIGHT: 71 IN | WEIGHT: 168.87 LBS | RESPIRATION RATE: 18 BRPM | DIASTOLIC BLOOD PRESSURE: 90 MMHG

## 2024-08-28 LAB
ANION GAP SERPL CALCULATED.3IONS-SCNC: 10 MMOL/L (ref 4–13)
BUN SERPL-MCNC: 7 MG/DL (ref 5–25)
CALCIUM SERPL-MCNC: 8.5 MG/DL (ref 8.4–10.2)
CHLORIDE SERPL-SCNC: 102 MMOL/L (ref 96–108)
CO2 SERPL-SCNC: 26 MMOL/L (ref 21–32)
CREAT SERPL-MCNC: 0.85 MG/DL (ref 0.6–1.3)
GFR SERPL CREATININE-BSD FRML MDRD: 100 ML/MIN/1.73SQ M
GLUCOSE SERPL-MCNC: 102 MG/DL (ref 65–140)
MAGNESIUM SERPL-MCNC: 1.6 MG/DL (ref 1.9–2.7)
POTASSIUM SERPL-SCNC: 3.2 MMOL/L (ref 3.5–5.3)
SODIUM SERPL-SCNC: 138 MMOL/L (ref 135–147)

## 2024-08-28 PROCEDURE — 80048 BASIC METABOLIC PNL TOTAL CA: CPT | Performed by: PHYSICIAN ASSISTANT

## 2024-08-28 PROCEDURE — NC001 PR NO CHARGE: Performed by: PHYSICIAN ASSISTANT

## 2024-08-28 PROCEDURE — 83735 ASSAY OF MAGNESIUM: CPT | Performed by: PHYSICIAN ASSISTANT

## 2024-08-28 PROCEDURE — 99024 POSTOP FOLLOW-UP VISIT: CPT | Performed by: PHYSICIAN ASSISTANT

## 2024-08-28 RX ORDER — MAGNESIUM SULFATE HEPTAHYDRATE 40 MG/ML
2 INJECTION, SOLUTION INTRAVENOUS ONCE
Status: COMPLETED | OUTPATIENT
Start: 2024-08-28 | End: 2024-08-28

## 2024-08-28 RX ORDER — POTASSIUM CHLORIDE 1500 MG/1
20 TABLET, EXTENDED RELEASE ORAL ONCE
Status: COMPLETED | OUTPATIENT
Start: 2024-08-28 | End: 2024-08-28

## 2024-08-28 RX ORDER — ACETAMINOPHEN 325 MG/1
650 TABLET ORAL EVERY 6 HOURS PRN
Status: DISCONTINUED | OUTPATIENT
Start: 2024-08-28 | End: 2024-08-28 | Stop reason: HOSPADM

## 2024-08-28 RX ORDER — OXYCODONE HYDROCHLORIDE 5 MG/1
5 TABLET ORAL EVERY 4 HOURS PRN
Status: DISCONTINUED | OUTPATIENT
Start: 2024-08-28 | End: 2024-08-28 | Stop reason: HOSPADM

## 2024-08-28 RX ADMIN — SODIUM CHLORIDE, SODIUM LACTATE, POTASSIUM CHLORIDE, AND CALCIUM CHLORIDE 50 ML/HR: .6; .31; .03; .02 INJECTION, SOLUTION INTRAVENOUS at 03:05

## 2024-08-28 RX ADMIN — HEPARIN SODIUM 5000 UNITS: 5000 INJECTION, SOLUTION INTRAVENOUS; SUBCUTANEOUS at 01:35

## 2024-08-28 RX ADMIN — HEPARIN SODIUM 5000 UNITS: 5000 INJECTION, SOLUTION INTRAVENOUS; SUBCUTANEOUS at 09:34

## 2024-08-28 RX ADMIN — PANTOPRAZOLE SODIUM 40 MG: 40 INJECTION, POWDER, FOR SOLUTION INTRAVENOUS at 09:34

## 2024-08-28 RX ADMIN — POTASSIUM CHLORIDE 20 MEQ: 1500 TABLET, EXTENDED RELEASE ORAL at 09:51

## 2024-08-28 RX ADMIN — AMLODIPINE BESYLATE 10 MG: 10 TABLET ORAL at 09:34

## 2024-08-28 RX ADMIN — MAGNESIUM SULFATE HEPTAHYDRATE 2 G: 40 INJECTION, SOLUTION INTRAVENOUS at 09:32

## 2024-08-28 RX ADMIN — POTASSIUM CHLORIDE 20 MEQ: 1500 TABLET, EXTENDED RELEASE ORAL at 11:36

## 2024-08-28 NOTE — DISCHARGE INSTR - AVS FIRST PAGE
DISCHARGE INSTRUCTIONS:  FOLLOW UP APPOINTMENT: Following discharge from the hospital call the office in 1-2 days to set up a post operative appointment to be seen in 2 weeks by Dr. Cheek.    AFTER YOU LEAVE: Following discharge from the hospital, you may have some questions about your procedure, your activities or your general condition.  These instructions may answer some of your questions and help you adjust during the first few days following your operation.  You can expect to be sore and tender mostly around the incisions. This pain can last approximally 5 days and should gradually improve daily.          INCISION SITES:  - You may apply ice to the incisions to help with pain. Avoid heat as this may make skin glue tacky.  - It is normal to have some bruising, swelling or mild discoloration around the incision.  If increasing redness, pain, or thick green/yellow discharge develops, call our office immediately.   -Do not apply any creams, lotions, or ointments (including neosporin).  If you have dressings:  - You may remove the gauze dressing from your incisions 48 hours after surgery. Underneath this dressing is a tape like dressing called Steri Strips. Leave the steri strips in place for 5-7 days. They may fall off on their own, this is ok.  If you have surgical adhesive glue:  -The incisions are closed with dissolvable sutures underneath the skin and a surgical adhesive glue overlying the skin.  - Do not pick at the adhesive. It will naturally wear off with time.  -Do not place a heat to the incisions as this can make the glue tacky.    WOUND CARE:  -If you have steri-strip, leave them on, allow to fall off naturally.    -Avoid tight adhering, irritative clothing.  -You may gently shower, let water and soap run down and pat dry; no scrubbing the incision sites.   -No submersion in water (baths, hot tubs, or swimming) until cleared at follow up appointment.    PAIN CONTROL/MEDICATIONS:  -Recommend taking over  the counter pain medication such as acetaminophen (Tylenol), Aleve, OR ibuprofen (Motrin/Advil) first; read and follow labels. You may alternate Tylenol and Ibuprofen every 3 hours.  -Only use prescribed pain medications as needed and try to taper down use overtime. Do not drive or operate heavy machinery while on prescription pain medications. Do not take with alcohol.  - If you were given a prescription for Percocet, Norco, or Vicodin for pain be sure to eat prior to taking as these medications as they may cause nausea and vomiting on an empty stomach.    -DO NOT take Tylenol  (acetaminophen) with prescribed pain medication for a fever or for further pain control as these medications already contain Tylenol in them. Take one or the other.  Do not exceed more than 4000 mg of acetaminophen in 24 hours or 3000 mg if you have liver disease.   -You may apply ice at the incision site as needed for 20 minutes on/off to decrease pain/swelling the first few days.   - If you were given an antibiotic take it until it is finished.    DIET/LIFE HABITS:  -Advance diet as tolerated. Start with bland foods. Once tolerating normal diet, include fiber-rich foods such as fruits and green leafy vegetables to help with bowel movements.  -Stay hydrated. Drink plenty of non-caffienated, non-alcoholic beverages such as water, juices, popsicles, etc.  -Refrain from alcohol consumption the first few weeks as this can impair wound healing and increase the risk of unwanted bleeding.   -No smoking at least 2 weeks post surgery, this can also delay wound healing. Smoking cessation is encouraged to improve your overall health. We can provide you with options to facilitate cessation.  -If you are having constipation, ensure you are eating a high fiber diet, stay active, and if requiring more, you may take over the counter stool softners as needed.    ACTIVITY/RESTRICTIONS:  -No strenuous exercise and no heavy lifting, pulling, or pushing >10-15  lbs x 4 weeks or until cleared by surgeon.  -The evening following the procedure you should rest as much as possible, sitting, lying or reclining.  You should be sure someone remains with you until the next morning.  Gradually increase your activity daily. Walking 3-4 times daily is good and stairs are ok.  Listen to your body.  If you start to get tired or sore then rest.   -No driving for 5 days or while taking narcotics for pain. You may begin to drive again once you can get in and out of a car comfortably and once off prescribed pain medication x 24 hours.       RETURN TO WORK:  -You may return to work or other activities as soon as your pain is controlled and you feel comfortable. For many people, this is 5 to 7 days after surgery. If your job requires heavy lifting you will need to be on light duty for 2-3 weeks.     CALL THE OFFICE IF/RETURN TO ED:  -Fevers/chills with uncontrolled temperature > 100.4 F.  -Increasing severe pain uncontrolled with pain medicine.  -Incision site is having thick, yellow drainage, increasing redness, is warm to the touch, or becoming increasingly tender.  -Any changes in overall campbell such as: nausea/vomitting, fevers/chills, diarrhea/constipation, inability to urinate, chest pains, palpations, trouble breathing, coughing, etc.

## 2024-08-28 NOTE — PROGRESS NOTES
"Progress Note - General Surgery   Davis Goss 54 y.o. male MRN: [id number]  Unit/Bed#: -01 Encounter: [id number]    ASSESSMENT:  POD#6 s/p elective open Jo's reversal, RONA  -Afebrile, vital signs stable  -K 3.2, Mg 1.6  -UOP 2 L  -JOE drain 190cc cc serous output  -Incisions with staples intact, no signs of infection  -Exhibiting signs of bowel function  -Tolerating fulls     PLAN:  -Advance to low res/low fiber diet  -Consult nutrition for education on diet for discharge  -PO home meds  -Analgesia/antiemetics as needed  -Scheduled PPI  -Replete potassium, magnesium  -D/C IVF  -Enourage out of bed ambulation  -Encourage incentive spirometer use  -Plan for discharge this afternoon if tolerating diet    SUBJECTIVE:  Doing well. Tolerating fulls. No n/v. Had another BM yesetrday. Passing gas. Pain well controlled, not taking analgesics.     OBJECTIVE:   Vitals:  Blood pressure 135/95, pulse 84, temperature 98.6 °F (37 °C), temperature source Oral, resp. rate 18, height 5' 11\" (1.803 m), weight 76.6 kg (168 lb 14 oz), SpO2 98%.,Body mass index is 23.55 kg/m².    I/Os:    Intake/Output Summary (Last 24 hours) at 8/28/2024 0826  Last data filed at 8/28/2024 0600  Gross per 24 hour   Intake 1660 ml   Output 2190 ml   Net -530 ml       Lines/Drains:  Invasive Devices       Peripheral Intravenous Line  Duration             Peripheral IV 08/27/24 Left;Proximal;Ventral (anterior) Forearm 1 day              Drain  Duration             Closed/Suction Drain Anterior;Left Hip Bulb 15 Fr. 5 days                    Physical Exam  Constitutional:       General: He is not in acute distress.     Appearance: He is not toxic-appearing.   Cardiovascular:      Rate and Rhythm: Normal rate.   Pulmonary:      Effort: Pulmonary effort is normal.   Abdominal:      General: There is no distension.      Palpations: Abdomen is soft.      Tenderness: There is no abdominal tenderness. There is no guarding or rebound.      Comments: " Midline incision and prior colostomy site with staples intact, no drainage, no erythema or edema, no induration or fluctuance  JOE drain intact draining serous fluid      Musculoskeletal:         General: No tenderness.      Right lower leg: No edema.      Left lower leg: No edema.   Skin:     General: Skin is warm and dry.   Neurological:      General: No focal deficit present.      Mental Status: He is alert.         Diagnostics:  I have personally reviewed pertinent lab results.     No results found.  Recent Results (from the past 36 hour(s))   CBC and differential    Collection Time: 08/27/24  5:03 AM   Result Value Ref Range    WBC 8.00 4.31 - 10.16 Thousand/uL    RBC 4.14 3.88 - 5.62 Million/uL    Hemoglobin 12.0 12.0 - 17.0 g/dL    Hematocrit 35.3 (L) 36.5 - 49.3 %    MCV 85 82 - 98 fL    MCH 29.0 26.8 - 34.3 pg    MCHC 34.0 31.4 - 37.4 g/dL    RDW 14.4 11.6 - 15.1 %    MPV 12.1 8.9 - 12.7 fL    Platelets 217 149 - 390 Thousands/uL    nRBC 0 /100 WBCs    Segmented % 74 43 - 75 %    Immature Grans % 0 0 - 2 %    Lymphocytes % 12 (L) 14 - 44 %    Monocytes % 10 4 - 12 %    Eosinophils Relative 3 0 - 6 %    Basophils Relative 1 0 - 1 %    Absolute Neutrophils 5.98 1.85 - 7.62 Thousands/µL    Absolute Immature Grans 0.02 0.00 - 0.20 Thousand/uL    Absolute Lymphocytes 0.93 0.60 - 4.47 Thousands/µL    Absolute Monocytes 0.83 0.17 - 1.22 Thousand/µL    Eosinophils Absolute 0.20 0.00 - 0.61 Thousand/µL    Basophils Absolute 0.04 0.00 - 0.10 Thousands/µL   Comprehensive metabolic panel    Collection Time: 08/27/24  5:03 AM   Result Value Ref Range    Sodium 137 135 - 147 mmol/L    Potassium 3.1 (L) 3.5 - 5.3 mmol/L    Chloride 101 96 - 108 mmol/L    CO2 25 21 - 32 mmol/L    ANION GAP 11 4 - 13 mmol/L    BUN 7 5 - 25 mg/dL    Creatinine 0.65 0.60 - 1.30 mg/dL    Glucose 88 65 - 140 mg/dL    Calcium 8.2 (L) 8.4 - 10.2 mg/dL    Corrected Calcium 9.2 8.3 - 10.1 mg/dL    AST 12 (L) 13 - 39 U/L    ALT 8 7 - 52 U/L     Alkaline Phosphatase 38 34 - 104 U/L    Total Protein 5.5 (L) 6.4 - 8.4 g/dL    Albumin 2.8 (L) 3.5 - 5.0 g/dL    Total Bilirubin 0.77 0.20 - 1.00 mg/dL    eGFR 111 ml/min/1.73sq m   Basic metabolic panel    Collection Time: 08/28/24  5:04 AM   Result Value Ref Range    Sodium 138 135 - 147 mmol/L    Potassium 3.2 (L) 3.5 - 5.3 mmol/L    Chloride 102 96 - 108 mmol/L    CO2 26 21 - 32 mmol/L    ANION GAP 10 4 - 13 mmol/L    BUN 7 5 - 25 mg/dL    Creatinine 0.85 0.60 - 1.30 mg/dL    Glucose 102 65 - 140 mg/dL    Calcium 8.5 8.4 - 10.2 mg/dL    eGFR 100 ml/min/1.73sq m   Magnesium    Collection Time: 08/28/24  5:04 AM   Result Value Ref Range    Magnesium 1.6 (L) 1.9 - 2.7 mg/dL       Current Medications:  Scheduled Meds:  Current Facility-Administered Medications   Medication Dose Route Frequency Provider Last Rate    acetaminophen  650 mg Oral Q6H PRN Surekha Michelle PA-C      aluminum-magnesium hydroxide-simethicone  30 mL Oral Q4H PRN Steve Klein MD      amLODIPine  10 mg Oral Daily Sandra Alexander PA-C      diphenhydrAMINE  25 mg Intravenous Q6H PRN Chaim Cristobal PA-C      heparin (porcine)  5,000 Units Subcutaneous Q8H [facility] Sandra Alexander PA-C      HYDROmorphone  0.2 mg Intravenous Q3H PRN Chaim Cristobal PA-C      melatonin  3 mg Oral HS Steve Klein MD      naloxone  0.1 mg Intravenous Q3 min PRN Sandra Alexander PA-C      nicotine  7 mg Transdermal Daily Sandra Alexander PA-C      ondansetron  4 mg Intravenous Q6H PRN Sandra Alexander PA-C      oxyCODONE  5 mg Oral Q4H PRN Surekha Michelle PA-C      pantoprazole  40 mg Intravenous Q12H [facility] Sandra Alexander PA-C       Continuous Infusions:   PRN Meds:    acetaminophen    aluminum-magnesium hydroxide-simethicone    diphenhydrAMINE    HYDROmorphone    naloxone    ondansetron    oxyCODONE      Surekha Michelle PA-C  8/28/2024

## 2024-08-28 NOTE — DISCHARGE SUMMARY
Discharge Summary   Davis Goss 54 y.o. male MRN: [id number]  Unit/Bed#: -01 Encounter: [id number]  8/28/2024    Admission Date: 8/22/2024   Discharge Date: 8/28/2024  Length of Stay: 6 days  Attending: Robles Cheek MD  Primary Service: General Surgery    Admitting Diagnosis:   Colostomy status (HCC) [Z93.3]    Discharge Diagnoses:   Principal Problem:    Colostomy status (HCC)      PMH/Secondary Diagnoses:  History reviewed. No pertinent past medical history.  Past Surgical History:   Procedure Laterality Date    COLONOSCOPY      CT CYSTOGRAM  02/02/2024    HARTMANS PROCEDURE N/A 01/09/2024    Procedure: EXPLORATORY LAPAROTOMY, SIGMOID COLECTOMY AND COLOSTOMY REPAIR OF BLADDER PERFORATION  HARTMANS PROCEDURE;  Surgeon: Robles Cheek MD;  Location: [facility];  Service: General    IR MIDLINE PLACEMENT  01/16/2024    KNEE SURGERY Right     AK CLOSURE ENTEROSTOMY LG/SMALL INTESTINE N/A 8/22/2024    Procedure: REVERSAL OF COLOSTOMY;  Surgeon: Robles Cheek MD;  Location: [facility];  Service: General    AK EXPLORATORY LAPAROTOMY CELIOTOMY W/WO BIOPSY SPX N/A 8/22/2024    Procedure: EXPLORATORY LAPAROTOMY;  Surgeon: Robles Cheek MD;  Location: [facility];  Service: General    AK LAPAROSCOPY W/LYSIS OF ADHESIONS N/A 8/22/2024    Procedure: LYSIS ADHESIONS;  Surgeon: Robles Cheek MD;  Location: [facility];  Service: General       Inpatient Medications:  Scheduled Meds:  Current Facility-Administered Medications   Medication Dose Route Frequency Provider Last Rate    acetaminophen  650 mg Oral Q6H PRN Surekha Michelle PA-C      aluminum-magnesium hydroxide-simethicone  30 mL Oral Q4H PRN Steve Klein MD      amLODIPine  10 mg Oral Daily Sandra Alexander PA-C      diphenhydrAMINE  25 mg Intravenous Q6H PRN Chaim Cristobal PA-C      heparin (porcine)  5,000 Units Subcutaneous Q8H [facility] Sandra Alexander PA-C      HYDROmorphone  0.2 mg Intravenous Q3H PRN Chaim Cristobal PA-C      melatonin  3 mg Oral  " Steve Klein MD      naloxone  0.1 mg Intravenous Q3 min PRN Sandra Alexander PA-C      nicotine  7 mg Transdermal Daily Sandra Alexander PA-C      ondansetron  4 mg Intravenous Q6H PRN Sandra Alexander PA-C      oxyCODONE  5 mg Oral Q4H PRN Surekha Michelle PA-C      pantoprazole  40 mg Intravenous Q12H [facility] Sandra Alexander PA-C       Continuous Infusions:   PRN Meds:  acetaminophen    aluminum-magnesium hydroxide-simethicone    diphenhydrAMINE    HYDROmorphone    naloxone    ondansetron    oxyCODONE    Allergies:  Allergies   Allergen Reactions    Penicillins Other (See Comments)     \"I don't know\"  Many years ago as a child         Imaging Studies:  No results found.      Procedures/Surgeries Performed:  EXPLORATORY LAPAROTOMY  LYSIS ADHESIONS  REVERSAL OF COLOSTOMY      Consultants:   None    History of Present Illness: Per HPI Dr. Cheek 6/7/2024 \"Davis Goss is a 54 y.o. male who presents to my office to schedule colostomy reversal.  Patient had a colostomy done few months ago due to presence of large bowel obstruction with stricture.  A sigmoid colectomy and end colostomy was performed.  He has no new complaints.  He underwent colonoscopy by me few days ago which did not show any pathology.  We also did a proctoscopy and sigmoidoscopy which showed healthy stump.\"      Summary of Hospital Course: Pt underwent elective Jo's reversal on 8/22/23. Additionally, he required extensive RONA. A leak test was performed and was positive, therefore a few sutures were placed. The leak test was repeated and was then negative. A JOE drain was left in place. Post operatively he had an NGT and Hannon catheter POD4 he exibited signs of bowel function and ihs NG tube was removed. He continued to have bowel function and his diet was slowly advanced. POD6 he was tolerating a low residue diet, having bowel function, voiding, oob ambulating.    Complications: None      Plan Upon " Discharge:  Keep JOE until office visit  OP FU in 1-2 weeks      Condition at Discharge: stable       Discharge instructions/Information to patient and family:   See after visit summary for information provided to patient and family. All instructions were discussed and the patient was understanding. All questions answered.     Provisions for Follow-Up Care:  See after visit summary for information related to follow-up care and any pertinent home health orders.      PCP: WES Santillan    Disposition: Home    Planned Readmission: No    Discharge Statement   I spent 20 minutes discharging the patient. This time was spent on the day of discharge. I had direct contact with the patient on the day of discharge. Additional documentation is required if more than 30 minutes were spent on discharge.     Discharge Medications:  See after visit summary for reconciled discharge medications provided to patient and family.      Surekha Michelle PA-C   8/28/2024

## 2024-08-28 NOTE — TELEPHONE ENCOUNTER
This writer had a follow up phone call discussion with pt r/t dietary suggestions for low-fiber low residue diet after discharge.

## 2024-08-28 NOTE — QUICK NOTE
Patient seen around 11 AM.  He tolerated his low fiber/residue diet this morning.  No changes.  Abdominal pain controlled.  Having bowel function.  JOE drain still with minimal serous output.  Reviewed discharge instructions in detail, all questions answered.  He reports ports he already received a work note.  Educated that he could call the office if he needs any adjustments.  Plan for outpatient follow-up in the general surgery office next week with one of the general surgeons (Dr. Cheek is off) for removal of JOE drain.  Patient said he is comfortable taking care of his own drain.  Reviewed routine care of the drain including emptying, clean drain dressing, stripping the drain, and recording output.  Discussed that if he has any questions everything we reviewed will be written down on his discharge paperwork.  He is stable for discharge  Surekha Michelle  8/28/2024  11:25 AM

## 2024-08-29 ENCOUNTER — PATIENT OUTREACH (OUTPATIENT)
Dept: CASE MANAGEMENT | Facility: OTHER | Age: 54
End: 2024-08-29

## 2024-08-29 DIAGNOSIS — Z71.89 COORDINATION OF COMPLEX CARE: Primary | ICD-10-CM

## 2024-08-29 DIAGNOSIS — Z91.89 HIGH RISK FOR READMISSION: ICD-10-CM

## 2024-08-29 NOTE — PROGRESS NOTES
Outpatient Care Management Note    HRR referral. Chart reviewed.  Patient was hospitalized at [facility] 8/22/24-8/28/24 for colostomy status.  Patient underwent elective Jo's reversal. He required extensive lysis of adhesions.  JOE drain left in place until OP follow up.    RN CM placed outreach call and spoke briefly with patient.  Patient states that he has some questions regarding his diet and what he can eat. RN CM confirmed that he received AVS instructions.  Reviewed AVS instructions regarding diet which recommended that patient start with a bland diet and can advance as tolerated. RN CM began to review some bland diet food suggestions when patient interrupted and requested that RN CM call him at another time as he needed to go do something and wanted to end call.    Patient agreed to callback either later today or tomorrow.  RN CM will schedule another patient outreach.

## 2024-08-29 NOTE — UTILIZATION REVIEW
NOTIFICATION OF ADMISSION DISCHARGE   This is a Notification of Discharge from [facility]. Please be advised that this patient has been discharge from our facility. Below you will find the admission and discharge date and time including the patient’s disposition.   UTILIZATION REVIEW CONTACT:  Bessy Eugene  Utilization   Network Utilization Review Department  Phone: [phone] x carefully listen to the prompts. All voicemails are confidential.  Email: [email or web address]     ADMISSION INFORMATION  PRESENTATION DATE: 8/22/2024  6:20 AM  OBERVATION ADMISSION DATE: N/A  INPATIENT ADMISSION DATE: 8/22/24 12:10 PM   DISCHARGE DATE: 8/28/2024 12:14 PM   DISPOSITION:Home/Self Care    Network Utilization Review Department  ATTENTION: Please call with any questions or concerns to [phone] and carefully listen to the prompts so that you are directed to the right person. All voicemails are confidential.   For Discharge needs, contact Care Management DC Support Team at [phone] opt. 2  Send all requests for admission clinical reviews, approved or denied determinations and any other requests to dedicated fax number below belonging to the campus where the patient is receiving treatment. List of dedicated fax numbers for the Facilities:  FACILITY NAME UR FAX NUMBER   ADMISSION DENIALS (Administrative/Medical Necessity) [phone]   DISCHARGE SUPPORT TEAM ([facility]) [phone]   PARENT CHILD HEALTH (Maternity/NICU/Pediatrics) [phone]   [facility] [phone]   [facility] [phone]   [facility] [phone]   [facility] [phone]   [facility] [phone]   [facility] [phone]   [facility] [phone]   [facility]  [phone]   [facility] [phone]   [facility] [phone]   [facility] [phone]   [facility] [phone]

## 2024-08-30 ENCOUNTER — PATIENT OUTREACH (OUTPATIENT)
Dept: CASE MANAGEMENT | Facility: OTHER | Age: 54
End: 2024-08-30

## 2024-08-30 ENCOUNTER — TRANSITIONAL CARE MANAGEMENT (OUTPATIENT)
Dept: FAMILY MEDICINE CLINIC | Facility: CLINIC | Age: 54
End: 2024-08-30

## 2024-08-30 DIAGNOSIS — R63.6 UNDERWEIGHT: Primary | ICD-10-CM

## 2024-08-30 NOTE — PROGRESS NOTES
"Outpatient Care Management Note    IB reminder: HRR referral- second outreach attempt needed.  Chart reviewed.    Patient was hospitalized at [facility] 8/22/24-8/28/24 for colostomy status.  Patient underwent elective Jo's reversal.  He required extensive lysis of adhesions.      RN CM made second outreach call to patient for follow up. Spoke with patient, introduced self, role and reason for call.  Patient states that everything is \"going good\". He went on to say that the only thing his is having trouble with is his diet and \"trying to figure out what is good to eat\".   Confirmed that patient received a copy of his AVS instructions.  Reviewed instructions with patient and noted recommendations listed. Per AVS, patient is to start with bland diet and can advance as tolerated. Reviewed some bland diet options and highlighted information on AVS instructions regarding which foods are good to eat and which foods to avoid.  Patient verbalized an improved understanding and also confirmed that he has a copy of that information that was provided on AVS.     Patient indicated that it would be helpful if someone could lay out meals ideas to make shopping and meal preparation easier.  RN CM suggested a possible referral to speak with a dietician and patient verbalized his interest.  RN CM will route message to PCP to request referral to dietician for additional diet education- patient verbalized understanding and appreciation.    Patient states that he has been able to move his bowels and and void without and issues.   Patient denies any complaints of pain a this time.    Patient states that other than his diet questions he is doing well and able to manage his care.  He denies any other RN CM needs at this time.    RN CM will schedule another outreach in 1-2 weeks to follow up on how patient is managing with diet concerns.      "

## 2024-09-06 ENCOUNTER — OFFICE VISIT (OUTPATIENT)
Dept: FAMILY MEDICINE CLINIC | Facility: CLINIC | Age: 54
End: 2024-09-06
Payer: COMMERCIAL

## 2024-09-06 VITALS
OXYGEN SATURATION: 98 % | SYSTOLIC BLOOD PRESSURE: 124 MMHG | WEIGHT: 167 LBS | BODY MASS INDEX: 23.38 KG/M2 | RESPIRATION RATE: 16 BRPM | HEART RATE: 88 BPM | DIASTOLIC BLOOD PRESSURE: 84 MMHG | HEIGHT: 71 IN

## 2024-09-06 DIAGNOSIS — E43 SEVERE PROTEIN-CALORIE MALNUTRITION (HCC): ICD-10-CM

## 2024-09-06 DIAGNOSIS — K56.609 LARGE BOWEL OBSTRUCTION (HCC): ICD-10-CM

## 2024-09-06 DIAGNOSIS — Z76.89 ENCOUNTER FOR SUPPORT AND COORDINATION OF TRANSITION OF CARE: Primary | ICD-10-CM

## 2024-09-06 DIAGNOSIS — Z93.3 COLOSTOMY STATUS (HCC): ICD-10-CM

## 2024-09-06 PROCEDURE — 1111F DSCHRG MED/CURRENT MED MERGE: CPT

## 2024-09-06 PROCEDURE — 99495 TRANSJ CARE MGMT MOD F2F 14D: CPT

## 2024-09-06 NOTE — PROGRESS NOTES
Transition of Care Visit  Name: Davis Goss      : 1970      MRN: [id number]  Encounter Provider: WES Santillan  Encounter Date: 2024   Encounter department: [facility] FAMILY MEDICINE    Assessment & Plan   1. Encounter for support and coordination of transition of care  2. Colostomy status (HCC)  Assessment & Plan:  S/p colostomy reversal 24, pt reports small low fiber meals. Denies abdominal pain. On exam 1 JOE drain LL abdomen, staples to mid abdominal and LLQ surgical wound intact no sxs of infection. Pt has post-op follow with general surgery 24.  3. Severe protein-calorie malnutrition (HCC)  Assessment & Plan:  Malnutrition Findings:    cachectic    BMI Findings:           Body mass index is 23.29 kg/m².     Referral to nutrition services has appt 10/31/24.    4. Large bowel obstruction (HCC)  Assessment & Plan:  S/p Hartsman procedure 24 with resolution. Pt is  s/p colostomy reversal 24.      Depression Screening and Follow-up Plan: Patient was screened for depression during today's encounter. They screened negative with a PHQ-2 score of 0.        History of Present Illness     Transitional Care Management Review:   Davis Gsos is a 54 y.o. male here for TCM follow up.     During the TCM phone call patient stated:  TCM Call       Date and time call was made  2024  2:30 PM    Hospital care reviewed  Records reviewed    Patient was hospitialized at  [facility]    Date of Admission  24    Date of discharge  24    Disposition  Home    Were the patients medications reviewed and updated  Yes    Current Symptoms  None          TCM Call       Post hospital issues  None    Should patient be enrolled in anticoag monitoring?  No    Scheduled for follow up?  Yes    Did you obtain your prescribed medications  Yes    Do you need help managing your prescriptions or medications  No    Is transportation to your appointment needed  No     I have advised the patient to call PCP with any new or worsening symptoms  Ileana Graham MA          Pt with hx of large bowel obstruction and subsequent Jo's procedure in January underwent elective Jo's reversal on 8/22/24. Pt required extensive hospital stay 8/22-8/28 secondary to positive leak test requiring placement of sutures. The leak test was repeated and was then negative. A JOE drain was left in place. Post operatively ppt was able to have NGT and benson catheter removed POD 4. With normal bowel function. Pt was discharged home with 1 JOE drain and educated on low residue diet. Pt reports has been tolerating diet eating 6 small meals daily, has been emptying JOE drain as taught however has had very scant drainage over the last week. Nutrition consult was placed following discharge secondary to pt being under weight. Pt has f/u with general surgery 9/11/24 and nutrition 10/31/24.      Review of Systems   Constitutional:  Negative for activity change, appetite change, chills, diaphoresis, fatigue, fever and unexpected weight change.   HENT:  Negative for congestion, dental problem, drooling, ear discharge, ear pain, facial swelling, hearing loss, mouth sores, nosebleeds, postnasal drip, rhinorrhea, sinus pressure, sinus pain, sneezing, sore throat, tinnitus, trouble swallowing and voice change.    Eyes:  Negative for photophobia, pain, discharge, redness, itching and visual disturbance.   Respiratory:  Negative for apnea, cough, choking, chest tightness, shortness of breath, wheezing and stridor.    Cardiovascular:  Negative for chest pain, palpitations and leg swelling.   Gastrointestinal:  Negative for abdominal distention, abdominal pain, anal bleeding, blood in stool, constipation, diarrhea, nausea, rectal pain and vomiting.   Endocrine: Negative for cold intolerance, heat intolerance, polydipsia, polyphagia and polyuria.   Genitourinary:  Negative for decreased urine volume, difficulty  "urinating, dysuria, flank pain, frequency, hematuria, penile discharge, penile pain, penile swelling, scrotal swelling, testicular pain and urgency.   Musculoskeletal:  Negative for arthralgias, back pain, gait problem, joint swelling, myalgias, neck pain and neck stiffness.   Skin:  Positive for wound (surgical wound mid abdomen and L lower abdomen). Negative for color change, pallor and rash.   Allergic/Immunologic: Negative for environmental allergies, food allergies and immunocompromised state.   Neurological:  Negative for dizziness, tremors, seizures, syncope, facial asymmetry, weakness, light-headedness, numbness and headaches.   Hematological:  Negative for adenopathy. Does not bruise/bleed easily.   Psychiatric/Behavioral:  Negative for agitation, behavioral problems, confusion, decreased concentration, dysphoric mood, hallucinations, self-injury, sleep disturbance and suicidal ideas. The patient is not nervous/anxious and is not hyperactive.    All other systems reviewed and are negative.    Objective     /84 (BP Location: Left arm, Patient Position: Sitting, Cuff Size: Standard)   Pulse 88   Resp 16   Ht 5' 11\" (1.803 m)   Wt 75.8 kg (167 lb)   SpO2 98%   BMI 23.29 kg/m²     Physical Exam  Vitals and nursing note reviewed.   Constitutional:       General: He is not in acute distress.     Appearance: Normal appearance. He is cachectic. He is not ill-appearing, toxic-appearing or diaphoretic.   HENT:      Head: Normocephalic and atraumatic.      Right Ear: Tympanic membrane, ear canal and external ear normal. There is no impacted cerumen.      Left Ear: Tympanic membrane, ear canal and external ear normal. There is no impacted cerumen.      Nose: Nose normal. No congestion or rhinorrhea.      Mouth/Throat:      Mouth: Mucous membranes are moist.      Pharynx: No oropharyngeal exudate or posterior oropharyngeal erythema.   Eyes:      General: No scleral icterus.        Right eye: No discharge.    "      Left eye: No discharge.      Extraocular Movements: Extraocular movements intact.      Conjunctiva/sclera: Conjunctivae normal.      Pupils: Pupils are equal, round, and reactive to light.   Neck:      Vascular: No carotid bruit.   Cardiovascular:      Rate and Rhythm: Normal rate and regular rhythm.      Pulses: Normal pulses.      Heart sounds: Normal heart sounds. No murmur heard.  Pulmonary:      Effort: Pulmonary effort is normal. No respiratory distress.      Breath sounds: Normal breath sounds. No stridor. No wheezing, rhonchi or rales.   Chest:      Chest wall: No tenderness.   Abdominal:      General: Bowel sounds are normal. There is no distension.      Palpations: Abdomen is soft. There is no mass.      Tenderness: There is no abdominal tenderness. There is no right CVA tenderness, left CVA tenderness, guarding or rebound.      Hernia: No hernia is present.   Musculoskeletal:         General: No swelling, tenderness, deformity or signs of injury. Normal range of motion.      Cervical back: Normal range of motion and neck supple. No rigidity or tenderness.      Right lower leg: No edema.      Left lower leg: No edema.   Lymphadenopathy:      Cervical: No cervical adenopathy.   Skin:     General: Skin is warm.      Capillary Refill: Capillary refill takes less than 2 seconds.      Findings: Wound (surgical) present. No erythema, lesion or rash.      Comments: Surgical wound to  mid abdomen and left lower abdomen, staples intact, no drainage or sxs of infection.  JOE drain to left lower abdomen intact with minimal drainage   Neurological:      General: No focal deficit present.      Mental Status: He is alert and oriented to person, place, and time.      Cranial Nerves: No cranial nerve deficit.      Sensory: No sensory deficit.      Motor: No weakness.      Coordination: Coordination normal.      Gait: Gait normal.      Deep Tendon Reflexes: Reflexes normal.   Psychiatric:         Mood and Affect: Mood  normal.         Behavior: Behavior normal. Behavior is cooperative.         Thought Content: Thought content normal.         Judgment: Judgment normal.       Medications have been reviewed by provider in current encounter    Administrative Statements

## 2024-09-06 NOTE — ASSESSMENT & PLAN NOTE
Malnutrition Findings:    cachectic    BMI Findings:           Body mass index is 23.29 kg/m².     Referral to nutrition services has appt 10/31/24.

## 2024-09-06 NOTE — ASSESSMENT & PLAN NOTE
S/p colostomy reversal 8/22/24, pt reports small low fiber meals. Denies abdominal pain. On exam 1 JOE drain LL abdomen, staples to mid abdominal and LLQ surgical wound intact no sxs of infection. Pt has post-op follow with general surgery 9/11/24.

## 2024-09-11 ENCOUNTER — OFFICE VISIT (OUTPATIENT)
Dept: SURGERY | Facility: CLINIC | Age: 54
End: 2024-09-11

## 2024-09-11 VITALS
OXYGEN SATURATION: 99 % | HEART RATE: 106 BPM | SYSTOLIC BLOOD PRESSURE: 150 MMHG | TEMPERATURE: 96.9 F | WEIGHT: 165.2 LBS | HEIGHT: 71 IN | DIASTOLIC BLOOD PRESSURE: 110 MMHG | BODY MASS INDEX: 23.13 KG/M2

## 2024-09-11 DIAGNOSIS — Z98.890 HISTORY OF COLOSTOMY REVERSAL: Primary | ICD-10-CM

## 2024-09-11 PROCEDURE — 99024 POSTOP FOLLOW-UP VISIT: CPT | Performed by: SURGERY

## 2024-09-11 NOTE — PROGRESS NOTES
54-year-old male patient who is 3 weeks status post reversal of colostomy.  He is doing well.  Tolerating diet.  No fever.  Minimum pain.  No diarrhea.  Having regular bowel movements.  Scant drain output    On clinical exam he is alert awake oriented.  Vitals are stable.  Abdomen is soft nontender.  Staples are present at the midline wound as well as the colostomy site incision.  Left lower quadrant drain which has got minimum serous fluid.    Patient is doing well.  I removed his drain.  All staples were removed.  He was instructed to avoid heavy lifting for another 2 to 3 weeks.  Follow-up with me in 3 weeks.  Continue to advance diet as tolerated.

## 2024-09-17 ENCOUNTER — TELEPHONE (OUTPATIENT)
Dept: SURGERY | Facility: CLINIC | Age: 54
End: 2024-09-17

## 2024-09-17 ENCOUNTER — PATIENT OUTREACH (OUTPATIENT)
Dept: CASE MANAGEMENT | Facility: OTHER | Age: 54
End: 2024-09-17

## 2024-09-17 NOTE — TELEPHONE ENCOUNTER
Pt came into office saying his employer is giving him a hard time regarding [facility] [employer] paperwork. Pt was supposed to be out of work beginning on 8/15, however [facility] paperwork that was filled out in July has a start date of 8/22. Pt requested that we correct date on paperwork.

## 2024-09-17 NOTE — PROGRESS NOTES
Outpatient Care Management Note    OP RN CM patient outreach for scheduled care management follow up- previous HRR referral.  Chart reviewed.    Patient was recently hospitalized at [facility] 8/22/24-8/28/24- underwent elective Jo's reversal.  Noted per chart that patient had appointment with PCP on 9/6/24 and with General Surgery on 9/11/24.    RN CM placed outreach call to patient for follow up.  No patient answer and no VM option was offered- unable to leave message.    RN CM will scheduled second outreach attempt in one week.

## 2024-09-24 ENCOUNTER — PATIENT OUTREACH (OUTPATIENT)
Dept: CASE MANAGEMENT | Facility: OTHER | Age: 54
End: 2024-09-24

## 2024-09-24 NOTE — LETTER
Date: 09/24/24    Dear Davis Goss,   My name is Eliane Bustamante; I am a registered nurse care manager working with [facility].  I have not been able to reach you since we last spoke and would like to set a time that I can talk with you over the phone.  My work is to help patients that have complex medical conditions get the care they need. This includes patients who may have been in the hospital or emergency room.    I have enclosed my contact information for you. Please call me with any questions you may have. I look forward to speaking with you.  Sincerely,  Eliane Bustamante   [phone]  Outpatient Care Manager   [facility] Emergency Dept discharge antibiotic (if prescribed): None  No changes in treatment per [facility] ED Lab Result Urine culture protocol.

## 2024-09-24 NOTE — PROGRESS NOTES
IB message received for covering RONNI Bustamante. Pt due for 2nd outreach attempt for follow up. Chart review completed.     Follow up scheduled:  10/2 Post Op  10/31 Dietitian    Call placed to pt with no answer. Detailed message left with call back contact information. UTR sent via mail to address listed on chart.   Next outreach due to close in 2 weeks. IB reminder sent to Eliane Bustamante RN, CM.

## 2024-10-02 ENCOUNTER — OFFICE VISIT (OUTPATIENT)
Dept: SURGERY | Facility: CLINIC | Age: 54
End: 2024-10-02

## 2024-10-02 VITALS
TEMPERATURE: 97.6 F | BODY MASS INDEX: 23.57 KG/M2 | WEIGHT: 168.4 LBS | SYSTOLIC BLOOD PRESSURE: 142 MMHG | HEART RATE: 119 BPM | DIASTOLIC BLOOD PRESSURE: 90 MMHG | HEIGHT: 71 IN | OXYGEN SATURATION: 98 %

## 2024-10-02 DIAGNOSIS — Z98.890 HISTORY OF COLOSTOMY REVERSAL: Primary | ICD-10-CM

## 2024-10-02 PROCEDURE — 99024 POSTOP FOLLOW-UP VISIT: CPT | Performed by: SURGERY

## 2024-10-02 NOTE — LETTER
October 2, 2024     Patient: Davis Goss  YOB: 1970  Date of Visit: 10/2/2024      To Whom it May Concern:    Davis Goss is under my professional care. Davis was seen in my office on 10/2/2024. Davis may return to work on 10/10/2024 without restrictions .    If you have any questions or concerns, please don't hesitate to call.         Sincerely,          Robles Cheek MD        CC: No Recipients

## 2024-10-02 NOTE — PROGRESS NOTES
54-year-old male patient who is 5-1/2 weeks status post reversal of colostomy.  He is doing well.  No nausea or vomiting.  Regular bowel movements.  No abdominal pain.    On clinical exam he is alert awake oriented.  Vitals are normal.  Abdomen is soft nontender.  No hernias.    He is doing well.  He can go back to work.  There are no restrictions.  Follow-up with me as needed.

## 2024-10-08 ENCOUNTER — PATIENT OUTREACH (OUTPATIENT)
Dept: CASE MANAGEMENT | Facility: OTHER | Age: 54
End: 2024-10-08

## 2024-10-08 NOTE — PROGRESS NOTES
Outpatient Care Management Note    IB reminder: UTR letter follow up for closure. Chart reviewed.    RN CM unable to reach patient during last two telephone outreach attempts.  UTR letter was sent via [place] mail with no patient response received.    RN CM will remove self from care team and close referral at this time due to no patient response to the above noted outreach attempts.

## 2024-10-18 ENCOUNTER — TELEPHONE (OUTPATIENT)
Age: 54
End: 2024-10-18

## 2024-10-18 NOTE — TELEPHONE ENCOUNTER
PT is stating [facility] is still claiming they still haven't received our fax.  The fax number is [phone].  I told him we did fax it again today.  He asked if we could please try one more time

## 2024-12-19 ENCOUNTER — NURSE TRIAGE (OUTPATIENT)
Age: 54
End: 2024-12-19

## 2024-12-19 NOTE — TELEPHONE ENCOUNTER
"Patient calls in with \"rash\" area that is spreading.  Started out as a pimple on his old colostomy site.  Believes his pants irritated it.  Denies drainage, warmth or redness.  \"Appears Scabbed\".  It is not painful or itchy.  About the size of a quarter.  Denies fever.  Would like it to be evaluated.  No appointments with [facility]. Appointment set with Dr. Boo.        Reason for Disposition   Wound becomes more painful or swollen, 3 or more days after injury    Answer Assessment - Initial Assessment Questions  1. LOCATION: \"Where is the wound located?\"       Bottom cut of colostomy scar.   2. WOUND APPEARANCE: \"What does the wound look like?\"       Scab that is spreading   3. SIZE: If redness is present, ask: \"What is the size of the red area?\" (Inches, centimeters, or compare to size of a coin)       Size of a quarter   4. SPREAD: \"What's changed in the last day?\"  \"Do you see any red streaks coming from the wound?\"      Yes   5. ONSET: \"When did it start to look infected?\"       About a month a go but is getting bigger.   6. MECHANISM: \"How did the wound start, what was the cause?\"      Surgical scar   7. PAIN: \"Is there any pain?\" If Yes, ask: \"How bad is the pain?\"   (Scale 1-10; or mild, moderate, severe)      Denies  8. FEVER: \"Do you have a fever?\" If Yes, ask: \"What is your temperature, how was it measured, and when did it start?\"      Denies   9. OTHER SYMPTOMS: \"Do you have any other symptoms?\" (e.g., shaking chills, weakness, rash elsewhere on body)    Protocols used: Wound Infection-ADULT-[place]    "

## 2024-12-20 PROBLEM — Z00.00 ANNUAL PHYSICAL EXAM: Status: RESOLVED | Noted: 2024-02-21 | Resolved: 2024-12-20

## 2025-01-09 ENCOUNTER — OFFICE VISIT (OUTPATIENT)
Dept: FAMILY MEDICINE CLINIC | Facility: CLINIC | Age: 55
End: 2025-01-09
Payer: COMMERCIAL

## 2025-01-09 VITALS
WEIGHT: 164 LBS | DIASTOLIC BLOOD PRESSURE: 86 MMHG | OXYGEN SATURATION: 96 % | BODY MASS INDEX: 22.96 KG/M2 | SYSTOLIC BLOOD PRESSURE: 126 MMHG | HEIGHT: 71 IN | RESPIRATION RATE: 16 BRPM | HEART RATE: 100 BPM

## 2025-01-09 DIAGNOSIS — I10 PRIMARY HYPERTENSION: ICD-10-CM

## 2025-01-09 DIAGNOSIS — L01.00 IMPETIGO: Primary | ICD-10-CM

## 2025-01-09 PROBLEM — K51.00 PANCOLITIS (HCC): Status: RESOLVED | Noted: 2024-01-17 | Resolved: 2025-01-09

## 2025-01-09 PROCEDURE — 99213 OFFICE O/P EST LOW 20 MIN: CPT | Performed by: FAMILY MEDICINE

## 2025-01-09 RX ORDER — CEPHALEXIN 500 MG/1
500 CAPSULE ORAL 3 TIMES DAILY
Qty: 21 CAPSULE | Refills: 0 | Status: SHIPPED | OUTPATIENT
Start: 2025-01-09 | End: 2025-01-16

## 2025-01-09 RX ORDER — TRIAMCINOLONE ACETONIDE 1 MG/G
CREAM TOPICAL 2 TIMES DAILY
Qty: 45 G | Refills: 0 | Status: SHIPPED | OUTPATIENT
Start: 2025-01-09

## 2025-01-09 NOTE — ASSESSMENT & PLAN NOTE
Blood pressure ok. Continue amlodipine 5 mg daily. Pt advised to continue low Na diet and to exercise on a regular basis.

## 2025-01-09 NOTE — PROGRESS NOTES
"Name: Davis Goss      : 1970      MRN: [id number]  Encounter Provider: Jaxson Boo MD  Encounter Date: 2025   Encounter department: [facility]  :  Assessment & Plan  Impetigo  Patient has had it for past 1 month. Not getting better. Will treatment with antibiotics for 1 week and steroid cream. Call if no better.   Orders:  •  triamcinolone (KENALOG) 0.1 % cream; Apply topically 2 (two) times a day  •  cephalexin (KEFLEX) 500 mg capsule; Take 1 capsule (500 mg total) by mouth 3 (three) times a day for 7 days    Primary hypertension  Blood pressure ok. Continue amlodipine 5 mg daily. Pt advised to continue low Na diet and to exercise on a regular basis.              Depression Screening and Follow-up Plan: Patient was screened for depression during today's encounter. They screened negative with a PHQ-2 score of 0.      History of Present Illness     Patient here for irritated area lower abdomen where jeans were rubbing. Has had it for 1 month and itchy at times. Not putting any medication on it at present. Never had it before.       Review of Systems   Constitutional:  Negative for fatigue and unexpected weight change.   Respiratory:  Negative for cough and shortness of breath.    Cardiovascular:  Negative for chest pain.   Gastrointestinal:  Negative for abdominal pain, constipation, diarrhea and vomiting.   Musculoskeletal:  Negative for arthralgias.   Skin:  Positive for rash.   Neurological:  Negative for dizziness and headaches.   Psychiatric/Behavioral:  Negative for dysphoric mood. The patient is not nervous/anxious.        Objective   /86 (BP Location: Left arm, Patient Position: Sitting, Cuff Size: Standard)   Pulse 100   Resp 16   Ht 5' 11\" (1.803 m)   Wt 74.4 kg (164 lb)   SpO2 96%   BMI 22.87 kg/m²      Physical Exam  Vitals and nursing note reviewed.   Constitutional:       Appearance: Normal appearance. He is normal weight.   Neck:      Vascular: " No carotid bruit.   Cardiovascular:      Rate and Rhythm: Normal rate and regular rhythm.      Heart sounds: Normal heart sounds. No murmur heard.  Pulmonary:      Effort: Pulmonary effort is normal.      Breath sounds: Normal breath sounds. No wheezing.   Musculoskeletal:      Cervical back: Normal range of motion and neck supple. No muscular tenderness.      Right lower leg: No edema.      Left lower leg: No edema.   Lymphadenopathy:      Cervical: No cervical adenopathy.   Skin:     Comments: 3 cm crusty lesion midline lower abdomen   Neurological:      Mental Status: He is alert.   Psychiatric:         Mood and Affect: Mood normal.         Behavior: Behavior normal.         Thought Content: Thought content normal.         Judgment: Judgment normal.

## 2025-01-09 NOTE — ASSESSMENT & PLAN NOTE
Patient has had it for past 1 month. Not getting better. Will treatment with antibiotics for 1 week and steroid cream. Call if no better.   Orders:  •  triamcinolone (KENALOG) 0.1 % cream; Apply topically 2 (two) times a day  •  cephalexin (KEFLEX) 500 mg capsule; Take 1 capsule (500 mg total) by mouth 3 (three) times a day for 7 days

## 2025-02-08 PROBLEM — L01.00 IMPETIGO: Status: RESOLVED | Noted: 2025-01-09 | Resolved: 2025-02-08

## 2025-02-11 DIAGNOSIS — R03.0 ELEVATED BLOOD PRESSURE READING: ICD-10-CM

## 2025-02-11 RX ORDER — AMLODIPINE BESYLATE 10 MG/1
10 TABLET ORAL DAILY
Qty: 90 TABLET | Refills: 1 | Status: SHIPPED | OUTPATIENT
Start: 2025-02-11

## 2025-05-17 DIAGNOSIS — L01.00 IMPETIGO: ICD-10-CM

## 2025-05-18 RX ORDER — TRIAMCINOLONE ACETONIDE 1 MG/G
CREAM TOPICAL 2 TIMES DAILY
Qty: 45 G | Refills: 0 | Status: SHIPPED | OUTPATIENT
Start: 2025-05-18

## 2025-07-30 ENCOUNTER — NURSE TRIAGE (OUTPATIENT)
Dept: OTHER | Facility: OTHER | Age: 55
End: 2025-07-30

## 2025-07-30 DIAGNOSIS — R03.0 ELEVATED BLOOD PRESSURE READING: ICD-10-CM

## 2025-07-30 RX ORDER — AMLODIPINE BESYLATE 10 MG/1
10 TABLET ORAL DAILY
Qty: 7 TABLET | Refills: 0 | Status: SHIPPED | OUTPATIENT
Start: 2025-07-30 | End: 2025-07-31 | Stop reason: SDUPTHER

## 2025-07-31 DIAGNOSIS — R03.0 ELEVATED BLOOD PRESSURE READING: ICD-10-CM

## 2025-07-31 RX ORDER — AMLODIPINE BESYLATE 10 MG/1
10 TABLET ORAL DAILY
Qty: 30 TABLET | Refills: 0 | Status: SHIPPED | OUTPATIENT
Start: 2025-07-31

## 2025-08-21 ENCOUNTER — OFFICE VISIT (OUTPATIENT)
Dept: FAMILY MEDICINE CLINIC | Facility: CLINIC | Age: 55
End: 2025-08-21
Payer: COMMERCIAL

## 2025-08-21 VITALS
OXYGEN SATURATION: 98 % | WEIGHT: 167 LBS | DIASTOLIC BLOOD PRESSURE: 80 MMHG | RESPIRATION RATE: 16 BRPM | BODY MASS INDEX: 23.38 KG/M2 | SYSTOLIC BLOOD PRESSURE: 128 MMHG | HEIGHT: 71 IN | HEART RATE: 97 BPM

## 2025-08-21 DIAGNOSIS — H00.015 HORDEOLUM EXTERNUM OF LEFT LOWER EYELID: Primary | ICD-10-CM

## 2025-08-21 DIAGNOSIS — R03.0 ELEVATED BLOOD PRESSURE READING: ICD-10-CM

## 2025-08-21 PROCEDURE — 99213 OFFICE O/P EST LOW 20 MIN: CPT | Performed by: FAMILY MEDICINE

## 2025-08-21 RX ORDER — AMLODIPINE BESYLATE 10 MG/1
10 TABLET ORAL DAILY
Qty: 30 TABLET | Refills: 5 | Status: SHIPPED | OUTPATIENT
Start: 2025-08-21

## 2025-08-21 RX ORDER — TOBRAMYCIN 3 MG/ML
1 SOLUTION/ DROPS OPHTHALMIC
Qty: 5 ML | Refills: 0 | Status: SHIPPED | OUTPATIENT
Start: 2025-08-21 | End: 2025-08-26

## (undated) DEVICE — PROXIMATE RELOADABLE LINEAR CUTTER WITH SAFETY LOCK-OUT, 75MM: Brand: PROXIMATE

## (undated) DEVICE — PERI/GYN PACK: Brand: CONVERTORS

## (undated) DEVICE — DRAPE LAPAROTOMY W/POUCHES

## (undated) DEVICE — Device

## (undated) DEVICE — SUT ETHILON 3-0 FS-1 18 IN 663G

## (undated) DEVICE — SUT VICRYL 3-0 SH 27 IN J416H

## (undated) DEVICE — ECHELON CONTOUR W/ GREEN RELOAD: Brand: ECHELON

## (undated) DEVICE — CHLORAPREP HI-LITE 26ML ORANGE

## (undated) DEVICE — SUT SILK 3-0 SH 30 IN K832H

## (undated) DEVICE — PROXIMATE LINEAR CUTTER RELOAD, BLUE, 75MM: Brand: PROXIMATE

## (undated) DEVICE — NEPTUNE E-SEP SMOKE EVACUATION PENCIL, COATED, 70MM BLADE, PUSH BUTTON SWITCH: Brand: NEPTUNE E-SEP

## (undated) DEVICE — SEPRA FILM 6 X 5

## (undated) DEVICE — VICRYL

## (undated) DEVICE — MEDI-VAC YANK SUCT HNDL W/TPRD BULBOUS TIP: Brand: CARDINAL HEALTH

## (undated) DEVICE — LIGACLIP MCA MULTIPLE CLIP APPLIERS, 20 MEDIUM CLIPS: Brand: LIGACLIP

## (undated) DEVICE — JACKSON-PRATT 100CC BULB RESERVOIR: Brand: CARDINAL HEALTH

## (undated) DEVICE — DRAIN JACKSON PRATT 15FR: Brand: CARDINAL HEALTH

## (undated) DEVICE — SUT SILK 2-0 30 IN A305H

## (undated) DEVICE — SUT PROLENE 3-0 SH 30 IN 8832H

## (undated) DEVICE — ASTOUND IMPERVIOUS SURGICAL GOWN: Brand: CONVERTORS

## (undated) DEVICE — CURVED, LARGE JAW, OPEN SEALER/DIVIDER NANO-COATED: Brand: LIGASURE IMPACT

## (undated) DEVICE — ENSEAL 20 CM SHAFT, LARGE JAW: Brand: ENSEAL X1

## (undated) DEVICE — 40595 XL TRENDELENBURG POSITIONING KIT: Brand: 40595 XL TRENDELENBURG POSITIONING KIT

## (undated) DEVICE — SUT SILK 3-0 SH CR/8 18 IN C013D

## (undated) DEVICE — BILE BAG

## (undated) DEVICE — TRAY FOLEY 16FR URIMETER SURESTEP

## (undated) DEVICE — PROXIMATE SKIN STAPLERS (35 WIDE) CONTAINS 35 STAINLESS STEEL STAPLES (FIXED HEAD): Brand: PROXIMATE

## (undated) DEVICE — SPONGE CHERRY 1/2IN

## (undated) DEVICE — SUT VICRYL 0 CT-1 27 IN J260H

## (undated) DEVICE — ECHELON CIRCULAR POWERED STAPLER: Brand: ECHELON CIRCULAR

## (undated) DEVICE — SUT SILK 3-0 30 IN SA84H

## (undated) DEVICE — GLOVE SRG BIOGEL 7.5

## (undated) DEVICE — LAVH/LAPAROSCOPY DRAPE: Brand: CONVERTORS

## (undated) DEVICE — GLOVE INDICATOR PI UNDERGLOVE SZ 8 BLUE

## (undated) DEVICE — SUT PDS II 1 XLH 96 IN LOOPED Z881G

## (undated) DEVICE — SUT VICRYL 3-0 18 IN J904T

## (undated) DEVICE — DRAPE UTILITY

## (undated) DEVICE — POOLE SUCTION HANDLE: Brand: CARDINAL HEALTH

## (undated) DEVICE — GENERAL/ PLASTIC TRAY STANDARD: Brand: DEROYAL

## (undated) DEVICE — ELECTRODE BLADE MOD  E-Z CLEAN 6.5IN -0014M

## (undated) DEVICE — DRAIN SPONGES,6 PLY: Brand: EXCILON

## (undated) DEVICE — SYRINGE CATH TIP 50ML

## (undated) DEVICE — FABRIC REINFORCED, SURGICAL GOWN, XL: Brand: CONVERTORS

## (undated) DEVICE — GLOVE INDICATOR PI UNDERGLOVE SZ 7.5 BLUE

## (undated) DEVICE — DRESSING MEPILEX AG BORDER POST-OP 4 X 12 IN

## (undated) DEVICE — SUT VICRYL 2-0 18 IN J905T

## (undated) DEVICE — PLUMEPEN PRO 10FT

## (undated) DEVICE — TOWEL SET X-RAY

## (undated) DEVICE — SUT PDS II 0 CT-1 27 IN Z340H

## (undated) DEVICE — BETHLEHEM MAJOR GENERAL PACK: Brand: CARDINAL HEALTH

## (undated) DEVICE — JP CHAN DRN SIL HUBLESS 15FR W/TRO: Brand: CARDINAL HEALTH

## (undated) DEVICE — STERILE DOUBLE BASIN SET PACK: Brand: CARDINAL HEALTH

## (undated) DEVICE — ANTIBACTERIAL UNDYED BRAIDED (POLYGLACTIN 910), SYNTHETIC ABSORBABLE SUTURE: Brand: COATED VICRYL

## (undated) DEVICE — SPONGE LAP 18 X 18 IN STRL RFD

## (undated) DEVICE — SUT SILK 0 30 IN SA86G